# Patient Record
Sex: MALE | Race: WHITE | NOT HISPANIC OR LATINO | Employment: OTHER | URBAN - METROPOLITAN AREA
[De-identification: names, ages, dates, MRNs, and addresses within clinical notes are randomized per-mention and may not be internally consistent; named-entity substitution may affect disease eponyms.]

---

## 2017-02-20 DIAGNOSIS — I49.3 PVC (PREMATURE VENTRICULAR CONTRACTION): Primary | ICD-10-CM

## 2017-02-20 RX ORDER — VERAPAMIL HYDROCHLORIDE 120 MG/1
120 TABLET, FILM COATED ORAL EVERY MORNING
Qty: 90 TABLET | Refills: 3 | Status: SHIPPED | OUTPATIENT
Start: 2017-02-20 | End: 2017-08-11

## 2017-03-10 DIAGNOSIS — I49.3 PVC (PREMATURE VENTRICULAR CONTRACTION): Primary | ICD-10-CM

## 2017-03-10 RX ORDER — LISINOPRIL 10 MG/1
10 TABLET ORAL DAILY
Qty: 90 TABLET | Refills: 3 | Status: SHIPPED | OUTPATIENT
Start: 2017-03-10 | End: 2017-08-17

## 2017-07-13 ENCOUNTER — TELEPHONE (OUTPATIENT)
Dept: PHARMACY | Facility: CLINIC | Age: 67
End: 2017-07-13

## 2017-07-13 ENCOUNTER — INITIAL CONSULT (OUTPATIENT)
Dept: HEMATOLOGY/ONCOLOGY | Facility: CLINIC | Age: 67
End: 2017-07-13
Payer: MEDICARE

## 2017-07-13 VITALS
BODY MASS INDEX: 26.36 KG/M2 | WEIGHT: 189 LBS | DIASTOLIC BLOOD PRESSURE: 86 MMHG | HEART RATE: 101 BPM | TEMPERATURE: 98 F | SYSTOLIC BLOOD PRESSURE: 152 MMHG | OXYGEN SATURATION: 99 % | RESPIRATION RATE: 18 BRPM

## 2017-07-13 DIAGNOSIS — C64.2 RENAL CELL CARCINOMA OF LEFT KIDNEY: ICD-10-CM

## 2017-07-13 DIAGNOSIS — C64.9 RENAL CELL CARCINOMA, UNSPECIFIED LATERALITY: ICD-10-CM

## 2017-07-13 DIAGNOSIS — C78.7 LIVER METASTASES: ICD-10-CM

## 2017-07-13 DIAGNOSIS — C64.2 RENAL CELL CARCINOMA OF LEFT KIDNEY: Primary | ICD-10-CM

## 2017-07-13 DIAGNOSIS — C64.9 RENAL CELL CARCINOMA OF KIDNEY EXCLUDING RENAL PELVIS: ICD-10-CM

## 2017-07-13 PROCEDURE — 99999 PR PBB SHADOW E&M-EST. PATIENT-LVL III: CPT | Mod: PBBFAC,,, | Performed by: INTERNAL MEDICINE

## 2017-07-13 PROCEDURE — 99205 OFFICE O/P NEW HI 60 MIN: CPT | Mod: S$PBB,,, | Performed by: INTERNAL MEDICINE

## 2017-07-13 PROCEDURE — 1159F MED LIST DOCD IN RCRD: CPT | Mod: S$GLB,,, | Performed by: INTERNAL MEDICINE

## 2017-07-13 RX ORDER — SUNITINIB MALATE 37.5 MG/1
CAPSULE ORAL
Qty: 14 CAPSULE | Refills: 6 | Status: SHIPPED | OUTPATIENT
Start: 2017-07-13 | End: 2017-07-13 | Stop reason: SDUPTHER

## 2017-07-13 RX ORDER — SUNITINIB MALATE 37.5 MG/1
CAPSULE ORAL
Qty: 14 CAPSULE | Refills: 6 | Status: SHIPPED | OUTPATIENT
Start: 2017-07-13 | End: 2017-07-17 | Stop reason: SDUPTHER

## 2017-07-13 RX ORDER — SODIUM CHLORIDE 0.9 % (FLUSH) 0.9 %
10 SYRINGE (ML) INJECTION
Status: CANCELLED | OUTPATIENT
Start: 2017-07-20

## 2017-07-13 RX ORDER — HEPARIN 100 UNIT/ML
500 SYRINGE INTRAVENOUS
Status: CANCELLED | OUTPATIENT
Start: 2017-07-20

## 2017-07-13 NOTE — PROGRESS NOTES
Subjective:       Patient ID: Bud Russo III is a 66 y.o. male.    Chief Complaint: No chief complaint on file.    HPI   REFERRING PHYSICIAN:  Dr. Lux, [facility] in [place].    REASON FOR REFERRAL:  Renal cell carcinoma, assumption of care.    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  His diagnosis was   established on April 30th when he presented to an Emergency Room in [place], [place], where he lived with left flank pain.  Radiologic studies showed a mass   in his left kidney.  Subsequently, he went to [place], [place], where he   was staged and was found to have a mass in his liver.  On 05/23/2017, he   underwent an embolization of his tumor in preparation for a possible liver   resection; and on 06/06/2017, he underwent a left nephrectomy.  He remained in   the [place] area in preparation of a liver resection; however, an MRI of his   abdomen and pelvis that was done last week showed that there was significant   progression of the metastatic liver disease with interval increase in size of a   dominant right hepatic lobe mass from 6.1 cm previously to 11.2 cm with evidence   of internal necrosis and peripheral enhancement.  In addition, multiple new   lesions were identified in both hepatic lobes, measuring up to 1.9 cm in the   left lateral lobe and 1.2 cm in the right hepatic lobe.  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  The patient met with an oncologist at [facility], and he was   offered chemotherapy.  He had decided to return to [place] and receive   chemotherapy at our facility.  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    PAST MEDICAL HISTORY:  As above.  He has  a history of hypothyroidism for which   he is on Synthroid and mild hypertension for which he was on lisinopril that was   subsequently switched to verapamil.  He is also on testosterone injections and   has been on them for more than 10 years.    SOCIAL HISTORY:  He is a Radiology physician.  He does not smoke and does not   drink alcohol.  He lives in [place] with his wife.    FAMILY HISTORY:  His father and his paternal grandmother had been diagnosed with   colon cancer.    Review of Systems      Overall he feels OK. He states that he has lost about 20 lbs since his surgery but his appetite is good.  He mentions some slight discomfort in his right shoulder which he attributes possibly to referred pain from his lover.  He denies any anxiety, depression, easy bruising, fevers, chills, night  sweats, weight loss, nausea, vomiting, diarrhea, constipation, diplopia, blurred vision, headache, chest pain, palpitations, shortness of breath, breast pain, abdominal pain, extremity pain, or difficulty ambulating.  The remainder of the ten-point ROS, including general, skin, lymph, H/N, cardiorespiratory, GI, , Neuro, Endocrine, and psychiatric is negative.     Objective:      Physical Exam    He is alert, oriented to time, place, person, pleasant, well      nourished, in no acute physical distress.  He is here with his wife.                                  VITAL SIGNS:  Reviewed                                      HEENT:  Normal.  There are no nasal, oral, lip, gingival, auricular, lid,    or conjunctival lesions.  Mucosae are moist and pink, and there is no        thrush.  Pupils are equal, reactive to light and accommodation.              Extraocular muscle movements are intact.  Fundi are sharp, and he has no papilledema.   Dentition is good.  There is no frontal or maxillary tenderness.                                   NECK:  Supple without JVD, adenopathy, or thyromegaly.                       LUNGS:  Clear to  auscultation without wheezing, rales, or rhonchi.           CARDIOVASCULAR:  Reveals an S1, S2, no murmurs, no rubs, no gallops.         ABDOMEN:  Soft, nontender, without organomegaly.  Bowel sounds are  present.  A scar from his left nephrectomy is identified.     Scars from bilateral varicocele surgeries are identified.                                                                 EXTREMITIES:  No cyanosis, clubbing, or edema.                                                               LYMPHATIC:  There is no cervical, axillary, or supraclavicular adenopathy.   SKIN:  Warm and moist, without petechiae, rashes, induration, or ecchymoses.           NEUROLOGIC:  DTRs are 0-1+ bilaterally, symmetrical, motor function is 5/5,  and cranial nerves are  within normal limits.  Both fundi are sharp.    Assessment:       1. Renal cell carcinoma, unspecified laterality    2. Liver metastases        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  Duration of visit was 70 minutes.  He understands that he has an incurable illness and he is aware of his prognosis.  We discussed the recommendations he received from [place]; he was given a prescription for sunitinib 37.5 mg tablets, and we have already initiated the authorization process for gemcitabine.  He will have baseline labs, and see me again in 5 days to initiate chemotherapy, as per the Cancer [id number] paper.  We will also obtain a non contrast CT of his chest and a bone scan.  In the absence of any neurological symptoms or papilledema, I do not think that he has to have a repeat brain imaging study at this point, however, I explained to him that should he develop any neurologic signs, a brain MRI will be ordered.  He voiced understanding.    I will see him with baseline labs the day of his chemotherapy.  His labs can be obtained early next week at the [facility].    Dr. Russo would like to continue to consult with his  [place] Physician, Dr. Lux.  I told him that we will be happy to follow his recommendations.  He will bring me the recent staging scans from [place] to be read within our system, and he will be restaged after three cycles.    His multiple questions were answered to his satisfaction.

## 2017-07-13 NOTE — Clinical Note
Needs bone scan and non contrast Ct of the chest at the [facility].  He also needs baseline labs, and to see me on Tuesday for chemotherapy

## 2017-07-14 ENCOUNTER — TELEPHONE (OUTPATIENT)
Dept: HEMATOLOGY/ONCOLOGY | Facility: CLINIC | Age: 67
End: 2017-07-14

## 2017-07-14 DIAGNOSIS — C79.9 METASTATIC CANCER: Primary | ICD-10-CM

## 2017-07-14 DIAGNOSIS — C78.7 LIVER METASTASES: Primary | ICD-10-CM

## 2017-07-14 NOTE — TELEPHONE ENCOUNTER
Dr. Stock,    A prescription was sent for sunitinib 37.5 mg daily x 2 weeks every 3 weeks.  Package insert indicates a dosing regimen of sunitinib 50 mg once daily for 4 weeks of a 6-week treatment cycle (4 weeks on, 2 weeks off) for RCC.  Can you please clarify dose and regimen?    Thanks,   Garcia Jensen, PharmD, BCPS [facility]  [phone]

## 2017-07-17 ENCOUNTER — TELEPHONE (OUTPATIENT)
Dept: PHARMACY | Facility: CLINIC | Age: 67
End: 2017-07-17

## 2017-07-17 DIAGNOSIS — C64.2 RENAL CELL ADENOCARCINOMA, LEFT: Primary | ICD-10-CM

## 2017-07-17 DIAGNOSIS — C64.2 RENAL CELL ADENOCARCINOMA, LEFT: ICD-10-CM

## 2017-07-17 RX ORDER — SUNITINIB MALATE 37.5 MG/1
37.5 CAPSULE ORAL DAILY
Qty: 28 CAPSULE | Refills: 3 | Status: SHIPPED | OUTPATIENT
Start: 2017-07-17 | End: 2017-07-17 | Stop reason: SDUPTHER

## 2017-07-17 RX ORDER — SUNITINIB MALATE 37.5 MG/1
37.5 CAPSULE ORAL DAILY
Qty: 28 CAPSULE | Refills: 3 | Status: SHIPPED | OUTPATIENT
Start: 2017-07-17 | End: 2018-01-16 | Stop reason: ALTCHOICE

## 2017-07-17 NOTE — TELEPHONE ENCOUNTER
Documentation Only    Sutent 37.5mg is approved 7-17-17-through 7-16-20        [employer] Pay Assistance  ID [id number]  [facility] [id number]  PCN [id number]  BIN [id number]  Effective 7-17-17 through 7-31-17  Processing Code 08  [phone]

## 2017-07-18 ENCOUNTER — TELEPHONE (OUTPATIENT)
Dept: SURGERY | Facility: CLINIC | Age: 67
End: 2017-07-18

## 2017-07-19 ENCOUNTER — HOSPITAL ENCOUNTER (OUTPATIENT)
Dept: RADIOLOGY | Facility: HOSPITAL | Age: 67
Discharge: HOME | End: 2017-07-19
Attending: INTERNAL MEDICINE
Payer: MEDICARE

## 2017-07-19 ENCOUNTER — TELEPHONE (OUTPATIENT)
Dept: HEMATOLOGY/ONCOLOGY | Facility: CLINIC | Age: 67
End: 2017-07-19

## 2017-07-19 DIAGNOSIS — C78.7 LIVER METASTASES: ICD-10-CM

## 2017-07-19 DIAGNOSIS — C64.9 RENAL CELL CARCINOMA, UNSPECIFIED LATERALITY: ICD-10-CM

## 2017-07-19 DIAGNOSIS — C79.9 METASTATIC CANCER: ICD-10-CM

## 2017-07-19 PROCEDURE — 71250 CT THORAX DX C-: CPT | Mod: 26,,, | Performed by: RADIOLOGY

## 2017-07-19 PROCEDURE — 70551 MRI BRAIN STEM W/O DYE: CPT | Mod: 26,,, | Performed by: RADIOLOGY

## 2017-07-19 PROCEDURE — 78306 BONE IMAGING WHOLE BODY: CPT | Mod: 26,,, | Performed by: RADIOLOGY

## 2017-07-19 PROCEDURE — A9503 TC99M MEDRONATE: HCPCS | Mod: PO

## 2017-07-20 ENCOUNTER — OFFICE VISIT (OUTPATIENT)
Dept: HEMATOLOGY/ONCOLOGY | Facility: CLINIC | Age: 67
End: 2017-07-20
Payer: MEDICARE

## 2017-07-20 ENCOUNTER — INFUSION (OUTPATIENT)
Dept: INFUSION THERAPY | Facility: HOSPITAL | Age: 67
End: 2017-07-20
Attending: INTERNAL MEDICINE
Payer: MEDICARE

## 2017-07-20 VITALS
WEIGHT: 189.63 LBS | DIASTOLIC BLOOD PRESSURE: 69 MMHG | OXYGEN SATURATION: 97 % | BODY MASS INDEX: 30.47 KG/M2 | RESPIRATION RATE: 18 BRPM | SYSTOLIC BLOOD PRESSURE: 141 MMHG | HEIGHT: 66 IN | HEART RATE: 95 BPM

## 2017-07-20 VITALS
RESPIRATION RATE: 18 BRPM | SYSTOLIC BLOOD PRESSURE: 124 MMHG | HEART RATE: 69 BPM | DIASTOLIC BLOOD PRESSURE: 68 MMHG | TEMPERATURE: 99 F

## 2017-07-20 DIAGNOSIS — C64.9 RENAL CELL CARCINOMA OF KIDNEY EXCLUDING RENAL PELVIS: Primary | ICD-10-CM

## 2017-07-20 DIAGNOSIS — Z51.11 ENCOUNTER FOR ANTINEOPLASTIC CHEMOTHERAPY: ICD-10-CM

## 2017-07-20 DIAGNOSIS — C64.2 RENAL CELL CARCINOMA, LEFT: Primary | ICD-10-CM

## 2017-07-20 DIAGNOSIS — C64.2 RENAL CELL CARCINOMA, LEFT: ICD-10-CM

## 2017-07-20 PROCEDURE — 1159F MED LIST DOCD IN RCRD: CPT | Mod: ,,, | Performed by: INTERNAL MEDICINE

## 2017-07-20 PROCEDURE — 25000003 PHARM REV CODE 250: Performed by: INTERNAL MEDICINE

## 2017-07-20 PROCEDURE — 96413 CHEMO IV INFUSION 1 HR: CPT

## 2017-07-20 PROCEDURE — 63600175 PHARM REV CODE 636 W HCPCS: Performed by: INTERNAL MEDICINE

## 2017-07-20 PROCEDURE — 99999 PR PBB SHADOW E&M-EST. PATIENT-LVL III: CPT | Mod: PBBFAC,,, | Performed by: INTERNAL MEDICINE

## 2017-07-20 PROCEDURE — 1126F AMNT PAIN NOTED NONE PRSNT: CPT | Mod: ,,, | Performed by: INTERNAL MEDICINE

## 2017-07-20 PROCEDURE — 96367 TX/PROPH/DG ADDL SEQ IV INF: CPT

## 2017-07-20 PROCEDURE — 99215 OFFICE O/P EST HI 40 MIN: CPT | Mod: S$PBB,,, | Performed by: INTERNAL MEDICINE

## 2017-07-20 RX ORDER — SODIUM CHLORIDE 0.9 % (FLUSH) 0.9 %
10 SYRINGE (ML) INJECTION
Status: DISCONTINUED | OUTPATIENT
Start: 2017-07-20 | End: 2017-07-20 | Stop reason: HOSPADM

## 2017-07-20 RX ORDER — ONDANSETRON 4 MG/1
4 TABLET, FILM COATED ORAL EVERY 6 HOURS PRN
Qty: 30 TABLET | Refills: 3 | Status: ON HOLD | OUTPATIENT
Start: 2017-07-20 | End: 2018-03-07

## 2017-07-20 RX ORDER — ONDANSETRON 4 MG/1
4 TABLET, FILM COATED ORAL EVERY 6 HOURS PRN
Qty: 30 TABLET | Refills: 3 | Status: SHIPPED | OUTPATIENT
Start: 2017-07-20 | End: 2017-07-20 | Stop reason: SDUPTHER

## 2017-07-20 RX ORDER — HEPARIN 100 UNIT/ML
500 SYRINGE INTRAVENOUS
Status: DISCONTINUED | OUTPATIENT
Start: 2017-07-20 | End: 2017-07-20 | Stop reason: HOSPADM

## 2017-07-20 RX ADMIN — DEXAMETHASONE SODIUM PHOSPHATE 10 MG: 4 INJECTION, SOLUTION INTRAMUSCULAR; INTRAVENOUS at 10:07

## 2017-07-20 RX ADMIN — GEMCITABINE 2000 MG: 1 INJECTION, POWDER, LYOPHILIZED, FOR SOLUTION INTRAVENOUS at 11:07

## 2017-07-20 RX ADMIN — SODIUM CHLORIDE: 0.9 INJECTION, SOLUTION INTRAVENOUS at 10:07

## 2017-07-20 NOTE — PLAN OF CARE
Problem: Chemotherapy Effects (Adult)  Goal: Signs and Symptoms of Listed Potential Problems Will be Absent, Minimized or Managed (Chemotherapy Effects)  Signs and symptoms of listed potential problems will be absent, minimized or managed by discharge/transition of care (reference Chemotherapy Effects (Adult) CPG).  Pt here for first cycle Gemzar. Presents with no symptoms. Will review Gemzar s/s and chemo education. Chemo care handout and binder given.

## 2017-07-20 NOTE — PROGRESS NOTES
Subjective:       Patient ID: Bud Russo III is a 66 y.o. male.    Chief Complaint: No chief complaint on file.    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  His brain MRI was unremarkable, and so was his bone scan except for two minor rib lesions that were felt not to be secondary to metastases.  His CBC shows a WBC count of 10,600 /mm3, Hg 13.8 gr/dl, Ht 40.6 % and platelets 229,000 /mm3.  His creatinine is 2.2 mg/dl, BUn is 26, and albumin is 3.2 gr/dl.    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  His diagnosis was established on April 30th when he presented to an Emergency Room in [place], [place], where he lived with left flank pain.  Radiologic studies showed a mass in his left kidney.  Subsequently, he went to [place], [place], where he was staged and was found to have a mass in his liver.  On 05/23/2017, he   underwent an embolization of his tumor in preparation for a possible liver resection; and on 06/06/2017, he underwent a left nephrectomy.  He remained in the [place] area in preparation of a liver resection; however, an MRI of his abdomen and pelvis that was done last week showed that there was significant progression of the metastatic liver disease with interval increase in size of a dominant right hepatic lobe mass from 6.1 cm previously to 11.2 cm with evidence of internal necrosis and peripheral enhancement.  In addition, multiple new lesions were identified in both hepatic lobes, measuring up to 1.9 cm in the left lateral lobe and 1.2 cm in the right hepatic lobe.  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  The patient met with an oncologist at [facility], and he was offered chemotherapy.  He had decided to return to [place] and receive chemotherapy at our facility.  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    Review of Systems      Overall he feels OK. He states that he has lost about 20 lbs since his surgery but his appetite is good.  He mentions some slight discomfort in his right shoulder which he attributes possibly to referred pain from his lover.  He denies any anxiety, depression, easy bruising, fevers, chills, night  sweats, weight loss, nausea, vomiting, diarrhea, constipation, diplopia, blurred vision, headache, chest pain, palpitations, shortness of breath, breast pain, abdominal pain, extremity pain, or difficulty ambulating.  The remainder of the ten-point ROS, including general, skin, lymph, H/N, cardiorespiratory, GI, , Neuro, Endocrine, and psychiatric is negative.     Objective:      Physical Exam    He is alert, oriented to time, place, person, pleasant, well      nourished, in no acute physical distress.  He is here with his wife.                                  VITAL SIGNS:  Reviewed                                      HEENT:  Normal.  There are no nasal, oral, lip, gingival, auricular, lid,    or conjunctival lesions.  Mucosae are moist and pink, and there is no        thrush.  Pupils are equal, reactive to light and accommodation.              Extraocular muscle movements are intact.  Fundi are sharp, and he has no papilledema.   Dentition is good.  There is no frontal or maxillary tenderness.                                   NECK:  Supple without JVD, adenopathy, or thyromegaly.                       LUNGS:  Clear to auscultation without wheezing, rales, or rhonchi.           CARDIOVASCULAR:  Reveals an S1, S2, no murmurs, no rubs, no gallops.         ABDOMEN:  Soft, nontender, without organomegaly.  Bowel sounds are  present.  A scar from his left nephrectomy is identified.     Scars from bilateral varicocele surgeries are identified.                                                                  EXTREMITIES:  No cyanosis, clubbing, or edema.                                                               LYMPHATIC:  There is no cervical, axillary, or supraclavicular adenopathy.   SKIN:  Warm and moist, without petechiae, rashes, induration, or ecchymoses.           NEUROLOGIC:  DTRs are 0-1+ bilaterally, symmetrical, motor function is 5/5,  and cranial nerves are  within normal limits.  Both fundi are sharp.    Assessment:       1. Renal cell carcinoma, left    2. 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  He will start sutent at 37.5 mg/ daily x 2 weeks, and receive his day 1 infusion of gemcitabine.  He may take zofran prn for nausea, and will see me in one week with a repeat CBc for his D8 chemotherapy. Consent form signed.  Neutropenic precautions explained.  His multiple questions were answered to his satisfaction.

## 2017-07-21 ENCOUNTER — TELEPHONE (OUTPATIENT)
Dept: HEMATOLOGY/ONCOLOGY | Facility: CLINIC | Age: 67
End: 2017-07-21

## 2017-07-25 ENCOUNTER — TELEPHONE (OUTPATIENT)
Dept: HEMATOLOGY/ONCOLOGY | Facility: CLINIC | Age: 67
End: 2017-07-25

## 2017-07-25 NOTE — TELEPHONE ENCOUNTER
Returned call to pt's wife, no answer, left message asking her to call back in regards to her 's apts.

## 2017-07-25 NOTE — TELEPHONE ENCOUNTER
----- Message from Jackeline Prakash RN sent at 7/25/2017  4:09 PM CDT -----  Contact: Pt wife      ----- Message -----  From: Elsa Mcgovern  Sent: 7/25/2017  10:50 AM  To: Day[facility] De [facility]a Staff    Pt wife is requesting a call back in regards to rescheduling pt upcoming appt.       Pt can be contacted at  [phone]

## 2017-07-26 NOTE — PROGRESS NOTES
Subjective:       Patient ID: Bud Russo III is a 66 y.o. male.    Chief Complaint: No chief complaint on file.    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  He is also on tarceva 37.5 mg/daily which he will take for another week.  His CBC shows a WBC count of 5,300 /mm3, Hg 13.9 gr/dl, Ht 40.8 % and platelets 180,000 /mm3.      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  His diagnosis was established on April 30th when he presented to an Emergency Room in [place], [place], where he lived with left flank pain.  Radiologic studies showed a mass in his left kidney.  Subsequently, he went to [place], [place], where he was staged and was found to have a mass in his liver.  On 05/23/2017, he   underwent an embolization of his tumor in preparation for a possible liver resection; and on 06/06/2017, he underwent a left nephrectomy.  He remained in the [place] area in preparation of a liver resection; however, an MRI of his abdomen and pelvis that was done last week showed that there was significant progression of the metastatic liver disease with interval increase in size of a dominant right hepatic lobe mass from 6.1 cm previously to 11.2 cm with evidence of internal necrosis and peripheral enhancement.  In addition, multiple new lesions were identified in both hepatic lobes, measuring up to 1.9 cm in the left lateral lobe and 1.2 cm in the right hepatic lobe.  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  The patient met with an oncologist at [facility], and he was offered chemotherapy.  He had decided to return to [place] and receive chemotherapy at our facility.  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  He  started chemotherapy a week ago, and he is due for his C1 D8 gemcitabine today.    Review of Systems      Overall he feels OK. He tolerated the first week of chemotehrapy quite well and did not experience any significant side effects other than occasional low grade fevers which were relieved with tylenol.  He denies any anxiety, depression, easy bruising, fevers, chills, night  sweats, weight loss, nausea, vomiting, diarrhea, constipation, diplopia, blurred vision, headache, chest pain, palpitations, shortness of breath, breast pain, abdominal pain, extremity pain, or difficulty ambulating.  The remainder of the ten-point ROS, including general, skin, lymph, H/N, cardiorespiratory, GI, , Neuro, Endocrine, and psychiatric is negative.     Objective:      Physical Exam    He is alert, oriented to time, place, person, pleasant, well      nourished, in no acute physical distress.  He is here with his wife.                                  VITAL SIGNS:  Reviewed                                      HEENT:  Normal.  There are no nasal, oral, lip, gingival, auricular, lid,    or conjunctival lesions.  Mucosae are moist and pink, and there is no        thrush.  Pupils are equal, reactive to light and accommodation.              Extraocular muscle movements are intact.  Fundi are sharp, and he has no papilledema.   Dentition is good.  There is no frontal or maxillary tenderness.                                   NECK:  Supple without JVD, adenopathy, or thyromegaly.                       LUNGS:  Clear to auscultation without wheezing, rales, or rhonchi.           CARDIOVASCULAR:  Reveals an S1, S2, no murmurs, no rubs, no gallops.         ABDOMEN:  Soft, nontender, without organomegaly.  Bowel sounds are  present.  A scar from his left nephrectomy is identified.     Scars from bilateral varicocele surgeries are identified.                                                                 EXTREMITIES:  No cyanosis, clubbing,  or edema.                                                               LYMPHATIC:  There is no cervical, axillary, or supraclavicular adenopathy.   SKIN:  Warm and moist, without petechiae, rashes, induration, or ecchymoses.           NEUROLOGIC:  DTRs are 0-1+ bilaterally, symmetrical, motor function is 5/5,  and cranial nerves are  within normal limits.  Both fundi are sharp.    Assessment:       1. Renal cell carcinoma of kidney excluding renal pelvis    2. Liver metastases    3. 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  He will continue the sutent at 37.5 mg/ daily x 2 weeks, and receive his day 8 infusion of gemcitabine.  He may take zofran prn for nausea, and will see me in 2 weeks with a repeat CBC for his C2 D1  chemotherapy. Neutropenic precautions explained.  He will be restaged after three cycles.  His multiple questions were answered to his satisfaction.

## 2017-07-27 ENCOUNTER — OFFICE VISIT (OUTPATIENT)
Dept: HEMATOLOGY/ONCOLOGY | Facility: CLINIC | Age: 67
End: 2017-07-27
Payer: MEDICARE

## 2017-07-27 ENCOUNTER — INFUSION (OUTPATIENT)
Dept: INFUSION THERAPY | Facility: HOSPITAL | Age: 67
End: 2017-07-27
Attending: INTERNAL MEDICINE
Payer: MEDICARE

## 2017-07-27 VITALS
OXYGEN SATURATION: 99 % | RESPIRATION RATE: 18 BRPM | TEMPERATURE: 99 F | WEIGHT: 189 LBS | DIASTOLIC BLOOD PRESSURE: 73 MMHG | SYSTOLIC BLOOD PRESSURE: 158 MMHG | HEART RATE: 64 BPM | BODY MASS INDEX: 30.37 KG/M2 | HEIGHT: 66 IN

## 2017-07-27 VITALS
SYSTOLIC BLOOD PRESSURE: 126 MMHG | HEART RATE: 64 BPM | DIASTOLIC BLOOD PRESSURE: 71 MMHG | RESPIRATION RATE: 16 BRPM | TEMPERATURE: 98 F

## 2017-07-27 DIAGNOSIS — Z51.11 ENCOUNTER FOR ANTINEOPLASTIC CHEMOTHERAPY: ICD-10-CM

## 2017-07-27 DIAGNOSIS — C64.9 RENAL CELL CARCINOMA OF KIDNEY EXCLUDING RENAL PELVIS: Primary | ICD-10-CM

## 2017-07-27 DIAGNOSIS — C78.7 LIVER METASTASES: ICD-10-CM

## 2017-07-27 PROCEDURE — 25000003 PHARM REV CODE 250: Performed by: INTERNAL MEDICINE

## 2017-07-27 PROCEDURE — 99215 OFFICE O/P EST HI 40 MIN: CPT | Mod: S$PBB,,, | Performed by: INTERNAL MEDICINE

## 2017-07-27 PROCEDURE — 1159F MED LIST DOCD IN RCRD: CPT | Mod: ,,, | Performed by: INTERNAL MEDICINE

## 2017-07-27 PROCEDURE — 63600175 PHARM REV CODE 636 W HCPCS: Performed by: INTERNAL MEDICINE

## 2017-07-27 PROCEDURE — 99999 PR PBB SHADOW E&M-EST. PATIENT-LVL I: CPT | Mod: PBBFAC,,, | Performed by: INTERNAL MEDICINE

## 2017-07-27 PROCEDURE — 96413 CHEMO IV INFUSION 1 HR: CPT

## 2017-07-27 PROCEDURE — 96367 TX/PROPH/DG ADDL SEQ IV INF: CPT

## 2017-07-27 RX ORDER — SODIUM CHLORIDE 0.9 % (FLUSH) 0.9 %
10 SYRINGE (ML) INJECTION
Status: CANCELLED | OUTPATIENT
Start: 2017-08-03

## 2017-07-27 RX ORDER — HEPARIN 100 UNIT/ML
500 SYRINGE INTRAVENOUS
Status: CANCELLED | OUTPATIENT
Start: 2017-08-03

## 2017-07-27 RX ORDER — HEPARIN 100 UNIT/ML
500 SYRINGE INTRAVENOUS
Status: CANCELLED | OUTPATIENT
Start: 2017-07-27

## 2017-07-27 RX ORDER — HEPARIN 100 UNIT/ML
500 SYRINGE INTRAVENOUS
Status: DISCONTINUED | OUTPATIENT
Start: 2017-07-27 | End: 2017-07-27 | Stop reason: HOSPADM

## 2017-07-27 RX ORDER — SODIUM CHLORIDE 0.9 % (FLUSH) 0.9 %
10 SYRINGE (ML) INJECTION
Status: CANCELLED | OUTPATIENT
Start: 2017-07-27

## 2017-07-27 RX ORDER — SODIUM CHLORIDE 0.9 % (FLUSH) 0.9 %
10 SYRINGE (ML) INJECTION
Status: DISCONTINUED | OUTPATIENT
Start: 2017-07-27 | End: 2017-07-27 | Stop reason: HOSPADM

## 2017-07-27 RX ADMIN — SODIUM CHLORIDE: 9 INJECTION, SOLUTION INTRAVENOUS at 03:07

## 2017-07-27 RX ADMIN — DEXAMETHASONE SODIUM PHOSPHATE 10 MG: 4 INJECTION, SOLUTION INTRAMUSCULAR; INTRAVENOUS at 03:07

## 2017-07-27 RX ADMIN — GEMCITABINE HYDROCHLORIDE 2000 MG: 200 INJECTION, POWDER, LYOPHILIZED, FOR SOLUTION INTRAVENOUS at 03:07

## 2017-07-27 NOTE — PLAN OF CARE
Problem: Patient Care Overview (Adult)  Goal: Plan of Care Review  Outcome: Ongoing (interventions implemented as appropriate)  Pt tolerated cycle 1, day 8 Gemzar IV infusion well without side effect. PIV removed. AVS printed and given to pt. VSS; NAD. Discharging at this time.

## 2017-08-10 ENCOUNTER — OFFICE VISIT (OUTPATIENT)
Dept: HEMATOLOGY/ONCOLOGY | Facility: CLINIC | Age: 67
End: 2017-08-10
Payer: MEDICARE

## 2017-08-10 ENCOUNTER — INFUSION (OUTPATIENT)
Dept: INFUSION THERAPY | Facility: HOSPITAL | Age: 67
End: 2017-08-10
Attending: INTERNAL MEDICINE
Payer: MEDICARE

## 2017-08-10 ENCOUNTER — TELEPHONE (OUTPATIENT)
Dept: HEMATOLOGY/ONCOLOGY | Facility: CLINIC | Age: 67
End: 2017-08-10

## 2017-08-10 VITALS
SYSTOLIC BLOOD PRESSURE: 148 MMHG | RESPIRATION RATE: 16 BRPM | HEART RATE: 75 BPM | TEMPERATURE: 98 F | DIASTOLIC BLOOD PRESSURE: 75 MMHG

## 2017-08-10 VITALS
BODY MASS INDEX: 29.76 KG/M2 | TEMPERATURE: 99 F | OXYGEN SATURATION: 99 % | DIASTOLIC BLOOD PRESSURE: 76 MMHG | HEART RATE: 86 BPM | WEIGHT: 189.63 LBS | SYSTOLIC BLOOD PRESSURE: 132 MMHG | HEIGHT: 67 IN | RESPIRATION RATE: 18 BRPM

## 2017-08-10 DIAGNOSIS — C64.9 RENAL CELL CARCINOMA OF KIDNEY EXCLUDING RENAL PELVIS: Primary | ICD-10-CM

## 2017-08-10 DIAGNOSIS — C78.7 LIVER METASTASES: ICD-10-CM

## 2017-08-10 DIAGNOSIS — I49.3 PVC (PREMATURE VENTRICULAR CONTRACTION): ICD-10-CM

## 2017-08-10 DIAGNOSIS — Z51.11 ENCOUNTER FOR ANTINEOPLASTIC CHEMOTHERAPY: ICD-10-CM

## 2017-08-10 PROCEDURE — 96367 TX/PROPH/DG ADDL SEQ IV INF: CPT

## 2017-08-10 PROCEDURE — 1159F MED LIST DOCD IN RCRD: CPT | Mod: ,,, | Performed by: INTERNAL MEDICINE

## 2017-08-10 PROCEDURE — 99999 PR PBB SHADOW E&M-EST. PATIENT-LVL III: CPT | Mod: PBBFAC,,, | Performed by: INTERNAL MEDICINE

## 2017-08-10 PROCEDURE — 99215 OFFICE O/P EST HI 40 MIN: CPT | Mod: S$PBB,,, | Performed by: INTERNAL MEDICINE

## 2017-08-10 PROCEDURE — 25000003 PHARM REV CODE 250: Performed by: INTERNAL MEDICINE

## 2017-08-10 PROCEDURE — 3075F SYST BP GE 130 - 139MM HG: CPT | Mod: ,,, | Performed by: INTERNAL MEDICINE

## 2017-08-10 PROCEDURE — 63600175 PHARM REV CODE 636 W HCPCS: Performed by: INTERNAL MEDICINE

## 2017-08-10 PROCEDURE — 1126F AMNT PAIN NOTED NONE PRSNT: CPT | Mod: ,,, | Performed by: INTERNAL MEDICINE

## 2017-08-10 PROCEDURE — 96413 CHEMO IV INFUSION 1 HR: CPT

## 2017-08-10 PROCEDURE — 3078F DIAST BP <80 MM HG: CPT | Mod: ,,, | Performed by: INTERNAL MEDICINE

## 2017-08-10 RX ORDER — HEPARIN 100 UNIT/ML
500 SYRINGE INTRAVENOUS
Status: DISCONTINUED | OUTPATIENT
Start: 2017-08-10 | End: 2017-08-10 | Stop reason: HOSPADM

## 2017-08-10 RX ORDER — SODIUM CHLORIDE 0.9 % (FLUSH) 0.9 %
10 SYRINGE (ML) INJECTION
Status: CANCELLED | OUTPATIENT
Start: 2017-08-10

## 2017-08-10 RX ORDER — FAMOTIDINE 40 MG/1
40 TABLET, FILM COATED ORAL 2 TIMES DAILY
Qty: 60 TABLET | Refills: 1 | Status: SHIPPED | OUTPATIENT
Start: 2017-08-10 | End: 2017-12-04 | Stop reason: SDUPTHER

## 2017-08-10 RX ORDER — HEPARIN 100 UNIT/ML
500 SYRINGE INTRAVENOUS
Status: CANCELLED | OUTPATIENT
Start: 2017-08-10

## 2017-08-10 RX ORDER — DOCUSATE SODIUM 100 MG/1
100 CAPSULE, LIQUID FILLED ORAL 3 TIMES DAILY PRN
Qty: 90 CAPSULE | Refills: 1 | Status: SHIPPED | OUTPATIENT
Start: 2017-08-10 | End: 2017-11-09 | Stop reason: SDUPTHER

## 2017-08-10 RX ORDER — VERAPAMIL HYDROCHLORIDE 120 MG/1
TABLET, FILM COATED ORAL
Qty: 90 TABLET | Refills: 3 | Status: CANCELLED | OUTPATIENT
Start: 2017-08-10

## 2017-08-10 RX ORDER — SODIUM CHLORIDE 0.9 % (FLUSH) 0.9 %
10 SYRINGE (ML) INJECTION
Status: DISCONTINUED | OUTPATIENT
Start: 2017-08-10 | End: 2017-08-10 | Stop reason: HOSPADM

## 2017-08-10 RX ADMIN — SODIUM CHLORIDE: 0.9 INJECTION, SOLUTION INTRAVENOUS at 01:08

## 2017-08-10 RX ADMIN — GEMCITABINE HYDROCHLORIDE 2020 MG: 200 INJECTION, POWDER, LYOPHILIZED, FOR SOLUTION INTRAVENOUS at 02:08

## 2017-08-10 RX ADMIN — DEXAMETHASONE SODIUM PHOSPHATE 10 MG: 4 INJECTION, SOLUTION INTRAMUSCULAR; INTRAVENOUS at 01:08

## 2017-08-10 NOTE — PROGRESS NOTES
Subjective:       Patient ID: Bud Russo III is a 66 y.o. male.    Chief Complaint: No chief complaint on file.    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  He is also on tarceva 37.5 mg/daily which he takes for two weeks every 3 weeks.  He started it again this am.  His CBC shows a WBC count of 3,800 /mm3, Hg 12.8 gr/dl, Ht 38.7 % and platelets 163,000 /mm3.  His creatinine is 2.5 mg/dl, and his K is 5.3 mEq/L.    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  His diagnosis was established on April 30th when he presented to an Emergency Room in [place], [place], where he lived with left flank pain.  Radiologic studies showed a mass in his left kidney.  Subsequently, he went to [place], [place], where he was staged and was found to have a mass in his liver.  On 05/23/2017, he   underwent an embolization of his tumor in preparation for a possible liver resection; and on 06/06/2017, he underwent a left nephrectomy.  He remained in the [place] area in preparation of a liver resection; however, an MRI of his abdomen and pelvis that was done last week showed that there was significant progression of the metastatic liver disease with interval increase in size of a dominant right hepatic lobe mass from 6.1 cm previously to 11.2 cm with evidence of internal necrosis and peripheral enhancement.  In addition, multiple new lesions were identified in both hepatic lobes, measuring up to 1.9 cm in the left lateral lobe and 1.2 cm in the right hepatic lobe.  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  The patient met with an oncologist at [place], and he was offered chemotherapy.  He had decided to return to [place] and receive chemotherapy at our facility.  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  He started chemotherapy 3 weeks ago, and he is due for his C2 D1 gemcitabine today.    Review of Systems      Overall he feels OK. He tolerated the first round of chemotehrapy quite well and did not experience any significant side effects.  He states that for the last 8 days he has not had any low grade fevers,   His energy level is good, and was able to move his household from [place] to [place].  He denies any anxiety, depression, easy bruising, fevers, chills, night  sweats, weight loss, nausea, vomiting, diarrhea, constipation, diplopia, blurred vision, headache, chest pain, palpitations, shortness of breath, breast pain, abdominal pain, extremity pain, or difficulty ambulating.  The remainder of the ten-point ROS, including general, skin, lymph, H/N, cardiorespiratory, GI, , Neuro, Endocrine, and psychiatric is negative.     Objective:      Physical Exam    He is alert, oriented to time, place, person, pleasant, well      nourished, in no acute physical distress.  He is here with his wife.                                  VITAL SIGNS:  Reviewed                                      HEENT:  Normal.  There are no nasal, oral, lip, gingival, auricular, lid,    or conjunctival lesions.  Mucosae are moist and pink, and there is no        thrush.  Pupils are equal, reactive to light and accommodation.              Extraocular muscle movements are intact.  Fundi are sharp, and he has no papilledema.   Dentition is good.  There is no frontal or maxillary tenderness.                                   NECK:  Supple without JVD, adenopathy, or thyromegaly.                       LUNGS:  Clear to auscultation without wheezing, rales, or rhonchi.           CARDIOVASCULAR:  Reveals an S1, S2, no murmurs, no rubs, no gallops.         ABDOMEN:  Soft, nontender, without organomegaly.  Bowel sounds are  present.  A scar from his left nephrectomy  is identified.     Scars from bilateral varicocele surgeries are identified.                                                                 EXTREMITIES:  No cyanosis, clubbing, or edema.                                                               LYMPHATIC:  There is no cervical, axillary, or supraclavicular adenopathy.   SKIN:  Warm and moist, without petechiae, rashes, induration, or ecchymoses.           NEUROLOGIC:  DTRs are 0-1+ bilaterally, symmetrical, motor function is 5/5,  and cranial nerves are  within normal limits.  Both fundi are sharp.    Assessment:       1. Renal cell carcinoma of kidney excluding renal pelvis    2. Liver metastases    3. Encounter for antineoplastic chemotherapy      4.    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  He will continue the sutent at 37.5 mg/ daily x 2 weeks, and receive his Cycle 2 day 1 infusion of gemcitabine.  He may take zofran prn for nausea, and will see me in 1 weeks with a repeat CBC for his C2 D8  chemotherapy. Neutropenic precautions explained.  He will be restaged after three cycles.  In regards to his elevated creatinine and K, I suggested he increase his hydration and we repeat his labs in 4 days; he opted to wait until he sees me again in a week.  His multiple questions were answered to his satisfaction.

## 2017-08-10 NOTE — PLAN OF CARE
Problem: Chemotherapy Effects (Adult)  Goal: Signs and Symptoms of Listed Potential Problems Will be Absent, Minimized or Managed (Chemotherapy Effects)  Signs and symptoms of listed potential problems will be absent, minimized or managed by discharge/transition of care (reference Chemotherapy Effects (Adult) CPG).   Outcome: Ongoing (interventions implemented as appropriate)  Pt arrived for Gemzar. VSS. PIV accessed with blood return. Will continue to monitor.

## 2017-08-10 NOTE — PLAN OF CARE
Problem: Patient Care Overview (Adult)  Goal: Plan of Care Review  Outcome: Ongoing (interventions implemented as appropriate)  Pt received Gemzar without complications. VSS. No complaints at this time. PIV removed and AVS given to patient

## 2017-08-11 RX ORDER — VERAPAMIL HYDROCHLORIDE 180 MG/1
180 TABLET, FILM COATED, EXTENDED RELEASE ORAL DAILY
Qty: 90 TABLET | Refills: 3 | Status: ON HOLD | OUTPATIENT
Start: 2017-08-11 | End: 2018-03-09 | Stop reason: HOSPADM

## 2017-08-16 NOTE — PROGRESS NOTES
Subjective:       Patient ID: Bud Russo III is a 66 y.o. male.    Chief Complaint: No chief complaint on file.    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  He is also on tarceva 37.5 mg/daily which he takes for two weeks every 3 weeks.  He started it again this am.  His CBC shows a WBC count of 2,800 /mm3, Hg 11.7 gr/dl, Ht 35.3 % and platelets 138,000 /mm3.  His creatinine is 2.3 mg/dl, and his K is 4.7 mEq/L.    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  His diagnosis was established on April 30th when he presented to an Emergency Room in [place], [place], where he lived with left flank pain.  Radiologic studies showed a mass in his left kidney.  Subsequently, he went to [place], [place], where he was staged and was found to have a mass in his liver.  On 05/23/2017, he   underwent an embolization of his tumor in preparation for a possible liver resection; and on 06/06/2017, he underwent a left nephrectomy.  He remained in the [place] area in preparation of a liver resection; however, an MRI of his abdomen and pelvis that was done last week showed that there was significant progression of the metastatic liver disease with interval increase in size of a dominant right hepatic lobe mass from 6.1 cm previously to 11.2 cm with evidence of internal necrosis and peripheral enhancement.  In addition, multiple new lesions were identified in both hepatic lobes, measuring up to 1.9 cm in the left lateral lobe and 1.2 cm in the right hepatic lobe.  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  The patient met with an oncologist at [facility], and he was offered chemotherapy.  He had decided to return to [place] and receive chemotherapy at our facility.  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  He started chemotherapy 5 weeks ago, and he is due for his C2 D8 gemcitabine today.    Review of Systems      Overall he feels OK. He tolerated the last round of chemotehrapy quite well and did not experience any significant side effects.  He states that for the last 8 days he has not had any low grade fevers,  exc ept for last night when his temperature gabby to 100.1 degrees.  No chills.   His energy level is good.  He denies any anxiety, depression, easy bruising, fevers, chills, night  sweats, weight loss, nausea, vomiting, diarrhea, constipation, diplopia, blurred vision, headache, chest pain, palpitations, shortness of breath, breast pain, abdominal pain, extremity pain, or difficulty ambulating.  The remainder of the ten-point ROS, including general, skin, lymph, H/N, cardiorespiratory, GI, , Neuro, Endocrine, and psychiatric is negative.     Objective:      Physical Exam    He is alert, oriented to time, place, person, pleasant, well      nourished, in no acute physical distress.  He is here with his wife.                                  VITAL SIGNS:  Reviewed                                      HEENT:  Normal.  There are no nasal, oral, lip, gingival, auricular, lid,    or conjunctival lesions.  Mucosae are moist and pink, and there is no        thrush.  Pupils are equal, reactive to light and accommodation.              Extraocular muscle movements are intact.     Dentition is good.                                  NECK:  Supple without JVD, adenopathy, or thyromegaly.                       LUNGS:  Clear to auscultation without wheezing, rales, or rhonchi.           CARDIOVASCULAR:  Reveals an S1, S2, no murmurs, no rubs, no gallops.         ABDOMEN:  Soft, nontender, without organomegaly.  Bowel sounds are  present.  A scar from his left nephrectomy is identified.     Scars from bilateral varicocele surgeries are  identified.                                                                 EXTREMITIES:  No cyanosis, clubbing, or edema.                                                               LYMPHATIC:  There is no cervical, axillary, or supraclavicular adenopathy.   SKIN:  Warm and moist, without petechiae, rashes, induration, or ecchymoses.           NEUROLOGIC:  DTRs are 0-1+ bilaterally, symmetrical, motor function is 5/5,  and cranial nerves are  within normal limits.  Both fundi are sharp.    Assessment:       1. Renal cell carcinoma of kidney excluding renal pelvis    2. Liver metastases    3. Encounter for antineoplastic chemotherapy      4.    CRF  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  He will continue the sutent at 37.5 mg/ daily x 1 week, and receive his Cycle 2 day 8 infusion of gemcitabine today.  He may take zofran prn for nausea, and will see me in 2 weeks with a repeat CBC for his C3 D1  chemotherapy. Neutropenic precautions explained.  He will be restaged after three cycles.  .  His multiple questions were answered to his satisfaction.

## 2017-08-17 ENCOUNTER — LAB VISIT (OUTPATIENT)
Dept: LAB | Facility: HOSPITAL | Age: 67
End: 2017-08-17
Attending: INTERNAL MEDICINE
Payer: MEDICARE

## 2017-08-17 ENCOUNTER — OFFICE VISIT (OUTPATIENT)
Dept: HEMATOLOGY/ONCOLOGY | Facility: CLINIC | Age: 67
End: 2017-08-17
Payer: MEDICARE

## 2017-08-17 ENCOUNTER — INFUSION (OUTPATIENT)
Dept: INFUSION THERAPY | Facility: HOSPITAL | Age: 67
End: 2017-08-17
Attending: INTERNAL MEDICINE
Payer: MEDICARE

## 2017-08-17 VITALS
HEART RATE: 82 BPM | DIASTOLIC BLOOD PRESSURE: 87 MMHG | WEIGHT: 191.38 LBS | SYSTOLIC BLOOD PRESSURE: 143 MMHG | BODY MASS INDEX: 29.97 KG/M2 | TEMPERATURE: 98 F

## 2017-08-17 VITALS
DIASTOLIC BLOOD PRESSURE: 74 MMHG | HEART RATE: 79 BPM | SYSTOLIC BLOOD PRESSURE: 131 MMHG | HEIGHT: 71 IN | RESPIRATION RATE: 17 BRPM | BODY MASS INDEX: 26.46 KG/M2 | WEIGHT: 189 LBS | TEMPERATURE: 97 F

## 2017-08-17 DIAGNOSIS — C64.9 RENAL CELL CARCINOMA OF KIDNEY EXCLUDING RENAL PELVIS: Primary | ICD-10-CM

## 2017-08-17 DIAGNOSIS — Z51.11 ENCOUNTER FOR ANTINEOPLASTIC CHEMOTHERAPY: ICD-10-CM

## 2017-08-17 DIAGNOSIS — C78.7 LIVER METASTASES: ICD-10-CM

## 2017-08-17 LAB
ALBUMIN SERPL BCP-MCNC: 2.6 G/DL
ALP SERPL-CCNC: 136 U/L
ALT SERPL W/O P-5'-P-CCNC: 37 U/L
ANION GAP SERPL CALC-SCNC: 8 MMOL/L
AST SERPL-CCNC: 36 U/L
BILIRUB SERPL-MCNC: 0.4 MG/DL
BUN SERPL-MCNC: 22 MG/DL
CALCIUM SERPL-MCNC: 9 MG/DL
CHLORIDE SERPL-SCNC: 106 MMOL/L
CO2 SERPL-SCNC: 25 MMOL/L
CREAT SERPL-MCNC: 2.3 MG/DL
ERYTHROCYTE [DISTWIDTH] IN BLOOD BY AUTOMATED COUNT: 15.9 %
EST. GFR  (AFRICAN AMERICAN): 33 ML/MIN/1.73 M^2
EST. GFR  (NON AFRICAN AMERICAN): 28.5 ML/MIN/1.73 M^2
GLUCOSE SERPL-MCNC: 115 MG/DL
HCT VFR BLD AUTO: 35.3 %
HGB BLD-MCNC: 11.7 G/DL
MCH RBC QN AUTO: 28.3 PG
MCHC RBC AUTO-ENTMCNC: 33.1 G/DL
MCV RBC AUTO: 85 FL
NEUTROPHILS # BLD AUTO: 1.4 K/UL
PLATELET # BLD AUTO: 138 K/UL
PMV BLD AUTO: 8.3 FL
POTASSIUM SERPL-SCNC: 4.7 MMOL/L
PROT SERPL-MCNC: 6.6 G/DL
RBC # BLD AUTO: 4.14 M/UL
SODIUM SERPL-SCNC: 139 MMOL/L
WBC # BLD AUTO: 2.88 K/UL

## 2017-08-17 PROCEDURE — 85027 COMPLETE CBC AUTOMATED: CPT

## 2017-08-17 PROCEDURE — 99999 PR PBB SHADOW E&M-EST. PATIENT-LVL III: CPT | Mod: PBBFAC,,, | Performed by: INTERNAL MEDICINE

## 2017-08-17 PROCEDURE — 63600175 PHARM REV CODE 636 W HCPCS: Performed by: INTERNAL MEDICINE

## 2017-08-17 PROCEDURE — 25000003 PHARM REV CODE 250: Performed by: INTERNAL MEDICINE

## 2017-08-17 PROCEDURE — 96367 TX/PROPH/DG ADDL SEQ IV INF: CPT

## 2017-08-17 PROCEDURE — 99213 OFFICE O/P EST LOW 20 MIN: CPT | Mod: PBBFAC | Performed by: INTERNAL MEDICINE

## 2017-08-17 PROCEDURE — 99215 OFFICE O/P EST HI 40 MIN: CPT | Mod: S$PBB,,, | Performed by: INTERNAL MEDICINE

## 2017-08-17 PROCEDURE — 36415 COLL VENOUS BLD VENIPUNCTURE: CPT

## 2017-08-17 PROCEDURE — 3079F DIAST BP 80-89 MM HG: CPT | Mod: ,,, | Performed by: INTERNAL MEDICINE

## 2017-08-17 PROCEDURE — 80053 COMPREHEN METABOLIC PANEL: CPT

## 2017-08-17 PROCEDURE — 3077F SYST BP >= 140 MM HG: CPT | Mod: ,,, | Performed by: INTERNAL MEDICINE

## 2017-08-17 PROCEDURE — 1126F AMNT PAIN NOTED NONE PRSNT: CPT | Mod: ,,, | Performed by: INTERNAL MEDICINE

## 2017-08-17 PROCEDURE — 96413 CHEMO IV INFUSION 1 HR: CPT

## 2017-08-17 PROCEDURE — 1159F MED LIST DOCD IN RCRD: CPT | Mod: ,,, | Performed by: INTERNAL MEDICINE

## 2017-08-17 RX ORDER — SODIUM CHLORIDE 0.9 % (FLUSH) 0.9 %
10 SYRINGE (ML) INJECTION
Status: CANCELLED | OUTPATIENT
Start: 2017-08-17

## 2017-08-17 RX ORDER — HEPARIN 100 UNIT/ML
500 SYRINGE INTRAVENOUS
Status: CANCELLED | OUTPATIENT
Start: 2017-08-17

## 2017-08-17 RX ADMIN — SODIUM CHLORIDE: 0.9 INJECTION, SOLUTION INTRAVENOUS at 10:08

## 2017-08-17 RX ADMIN — DEXAMETHASONE SODIUM PHOSPHATE 10 MG: 4 INJECTION, SOLUTION INTRAMUSCULAR; INTRAVENOUS at 10:08

## 2017-08-17 RX ADMIN — GEMCITABINE HYDROCHLORIDE 2000 MG: 200 INJECTION, POWDER, LYOPHILIZED, FOR SOLUTION INTRAVENOUS at 10:08

## 2017-08-17 NOTE — PLAN OF CARE
Problem: Patient Care Overview  Goal: Plan of Care Review  Outcome: Ongoing (interventions implemented as appropriate)  Tolerated treatment well.  Advised to call MD for any problems or concerns.  AVS given.  RTC in 2 weeks. NAD noted upon discharge.  Encouraged to increase fluid intake.  Pt voiced understanding.

## 2017-08-30 NOTE — PROGRESS NOTES
Subjective:       Patient ID: Bud Russo III is a 66 y.o. male.    Chief Complaint: No chief complaint on file.    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  He is also on tarceva 37.5 mg daily which he takes for two weeks every 3 weeks.  He started it again this am.  His CBC shows a WBC count of 2,800 /mm3, Hg 11.7 gr/dl, Ht 35.3 % and platelets 138,000 /mm3.  His creatinine is 2.3 mg/dl, and his K is 4.7 mEq/L.    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  His diagnosis was established on April 30th when he presented to an Emergency Room in [place], [place], where he lived with left flank pain.  Radiologic studies showed a mass in his left kidney.  Subsequently, he went to [place], [place], where he was staged and was found to have a mass in his liver.  On 05/23/2017, he   underwent an embolization of his tumor in preparation for a possible liver resection; and on 06/06/2017, he underwent a left nephrectomy.  He remained in the [place] area in preparation of a liver resection; however, an MRI of his abdomen and pelvis that was done last week showed that there was significant progression of the metastatic liver disease with interval increase in size of a dominant right hepatic lobe mass from 6.1 cm previously to 11.2 cm with evidence of internal necrosis and peripheral enhancement.  In addition, multiple new lesions were identified in both hepatic lobes, measuring up to 1.9 cm in the left lateral lobe and 1.2 cm in the right hepatic lobe.  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  The patient met with an oncologist at [place], and he was offered chemotherapy.  He had decided to return to [place] and receive chemotherapy at our facility.  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  He started chemotherapy 5 weeks ago, and he is due for his C2 D8 gemcitabine today.    Review of Systems      Overall he feels OK. He tolerated the last round of chemotherapy quite well and did not experience any significant side effects.  He states that for the last 8 days he has not had any low grade fevers,  And was able to drive himself to [place] and back.  No chills.   His energy level is good.  He denies any anxiety, depression, easy bruising, fevers, chills, night  sweats, weight loss, nausea, vomiting, diarrhea, constipation, diplopia, blurred vision, headache, chest pain, palpitations, shortness of breath, breast pain, abdominal pain, extremity pain, or difficulty ambulating.  The remainder of the ten-point ROS, including general, skin, lymph, H/N, cardiorespiratory, GI, , Neuro, Endocrine, and psychiatric is negative.     Objective:      Physical Exam    He is alert, oriented to time, place, person, pleasant, well      nourished, in no acute physical distress.  He is here with his wife.                                  VITAL SIGNS:  Reviewed                                      HEENT:  Normal.  There are no nasal, oral, lip, gingival, auricular, lid,    or conjunctival lesions.  Mucosae are moist and pink, and there is no        thrush.  Pupils are equal, reactive to light and accommodation.              Extraocular muscle movements are intact.     Dentition is good.                                  NECK:  Supple without JVD, adenopathy, or thyromegaly.                       LUNGS:  Clear to auscultation without wheezing, rales, or rhonchi.           CARDIOVASCULAR:  Reveals an S1, S2, no murmurs, no rubs, no gallops.         ABDOMEN:  Soft, nontender, without organomegaly.  Bowel sounds are  present.  A scar from his left nephrectomy is identified.     Scars from bilateral varicocele surgeries are identified.                                                                  EXTREMITIES:  No cyanosis, clubbing, or edema.                                                               LYMPHATIC:  There is no cervical, axillary, or supraclavicular adenopathy.   SKIN:  Warm and moist, without petechiae, rashes, induration, or ecchymoses.           NEUROLOGIC:  DTRs are 0-1+ bilaterally, symmetrical, motor function is 5/5,  and cranial nerves are  within normal limits.  Both fundi are sharp.    Assessment:       1. Renal cell carcinoma of kidney excluding renal pelvis    2. Liver metastases    3. Encounter for antineoplastic chemotherapy      4.    CRF  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  He will remain on sutent at 37.5 mg/ daily x 2 weeks, and receive his Cycle 3 day 1 infusion of gemcitabine today.  He may take zofran prn for nausea, and will see me in 1 weeks with a repeat CBC for his C3 D8  chemotherapy. Neutropenic precautions explained.  He will be restaged two weeks from now.  .  His multiple questions were answered to his satisfaction.

## 2017-08-31 ENCOUNTER — OFFICE VISIT (OUTPATIENT)
Dept: HEMATOLOGY/ONCOLOGY | Facility: CLINIC | Age: 67
End: 2017-08-31
Payer: MEDICARE

## 2017-08-31 ENCOUNTER — INFUSION (OUTPATIENT)
Dept: INFUSION THERAPY | Facility: HOSPITAL | Age: 67
End: 2017-08-31
Attending: INTERNAL MEDICINE
Payer: MEDICARE

## 2017-08-31 VITALS
HEART RATE: 73 BPM | DIASTOLIC BLOOD PRESSURE: 78 MMHG | BODY MASS INDEX: 27.24 KG/M2 | TEMPERATURE: 98 F | WEIGHT: 195.31 LBS | SYSTOLIC BLOOD PRESSURE: 127 MMHG

## 2017-08-31 VITALS — HEIGHT: 71 IN | WEIGHT: 195.31 LBS | BODY MASS INDEX: 27.34 KG/M2

## 2017-08-31 DIAGNOSIS — Z51.11 ENCOUNTER FOR ANTINEOPLASTIC CHEMOTHERAPY: ICD-10-CM

## 2017-08-31 DIAGNOSIS — C64.9 RENAL CELL CARCINOMA OF KIDNEY EXCLUDING RENAL PELVIS: Primary | ICD-10-CM

## 2017-08-31 DIAGNOSIS — C78.7 LIVER METASTASES: ICD-10-CM

## 2017-08-31 PROCEDURE — 96413 CHEMO IV INFUSION 1 HR: CPT

## 2017-08-31 PROCEDURE — 96367 TX/PROPH/DG ADDL SEQ IV INF: CPT

## 2017-08-31 PROCEDURE — 99999 PR PBB SHADOW E&M-EST. PATIENT-LVL III: CPT | Mod: PBBFAC,,, | Performed by: INTERNAL MEDICINE

## 2017-08-31 PROCEDURE — 1126F AMNT PAIN NOTED NONE PRSNT: CPT | Mod: ,,, | Performed by: INTERNAL MEDICINE

## 2017-08-31 PROCEDURE — 63600175 PHARM REV CODE 636 W HCPCS: Performed by: INTERNAL MEDICINE

## 2017-08-31 PROCEDURE — 3078F DIAST BP <80 MM HG: CPT | Mod: ,,, | Performed by: INTERNAL MEDICINE

## 2017-08-31 PROCEDURE — 25000003 PHARM REV CODE 250: Performed by: INTERNAL MEDICINE

## 2017-08-31 PROCEDURE — 1159F MED LIST DOCD IN RCRD: CPT | Mod: ,,, | Performed by: INTERNAL MEDICINE

## 2017-08-31 PROCEDURE — 3074F SYST BP LT 130 MM HG: CPT | Mod: ,,, | Performed by: INTERNAL MEDICINE

## 2017-08-31 PROCEDURE — 99215 OFFICE O/P EST HI 40 MIN: CPT | Mod: S$PBB,,, | Performed by: INTERNAL MEDICINE

## 2017-08-31 PROCEDURE — 99213 OFFICE O/P EST LOW 20 MIN: CPT | Mod: PBBFAC | Performed by: INTERNAL MEDICINE

## 2017-08-31 RX ORDER — SODIUM CHLORIDE 0.9 % (FLUSH) 0.9 %
10 SYRINGE (ML) INJECTION
Status: CANCELLED | OUTPATIENT
Start: 2017-09-07

## 2017-08-31 RX ORDER — SODIUM CHLORIDE 0.9 % (FLUSH) 0.9 %
10 SYRINGE (ML) INJECTION
Status: CANCELLED | OUTPATIENT
Start: 2017-08-31

## 2017-08-31 RX ORDER — SODIUM CHLORIDE 0.9 % (FLUSH) 0.9 %
10 SYRINGE (ML) INJECTION
Status: DISCONTINUED | OUTPATIENT
Start: 2017-08-31 | End: 2017-08-31 | Stop reason: HOSPADM

## 2017-08-31 RX ORDER — HEPARIN 100 UNIT/ML
500 SYRINGE INTRAVENOUS
Status: CANCELLED | OUTPATIENT
Start: 2017-09-07

## 2017-08-31 RX ORDER — HEPARIN 100 UNIT/ML
500 SYRINGE INTRAVENOUS
Status: CANCELLED | OUTPATIENT
Start: 2017-08-31

## 2017-08-31 RX ADMIN — DEXAMETHASONE SODIUM PHOSPHATE 10 MG: 4 INJECTION, SOLUTION INTRAMUSCULAR; INTRAVENOUS at 10:08

## 2017-08-31 RX ADMIN — GEMCITABINE HYDROCHLORIDE 2110 MG: 200 INJECTION, POWDER, LYOPHILIZED, FOR SOLUTION INTRAVENOUS at 11:08

## 2017-08-31 RX ADMIN — SODIUM CHLORIDE: 0.9 INJECTION, SOLUTION INTRAVENOUS at 10:08

## 2017-08-31 NOTE — PLAN OF CARE
Problem: Patient Care Overview  Goal: Plan of Care Review  Outcome: Ongoing (interventions implemented as appropriate)  Patient completed gemzar with NAD noted upon discharge. Slowed rate down per patient's request. PIV, +blood return, heat applied to site during infusion. Verbalized understanding to call MD for any questions/concerns. AVS given. PIV removed, catheter tip intact. Discharged home, ambulated independently with wife by side.

## 2017-09-06 NOTE — PROGRESS NOTES
Subjective:       Patient ID: Bud Russo III is a 66 y.o. male.    Chief Complaint: No chief complaint on file.    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  He is also on tarceva 37.5 mg daily which he takes for two weeks every 3 weeks.  He started it again a week ago, on C3 D1..  His CBC shows a WBC count of 3,700 /mm3, Hg 11.2 gr/dl, Ht 33.8 % and platelets 167,000 /mm3.  His creatinine is 2.3 mg/dl, and his K is 4.7 mEq/L.    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  His diagnosis was established on April 30th when he presented to an Emergency Room in [place], [place], where he lived with left flank pain.  Radiologic studies showed a mass in his left kidney.  Subsequently, he went to [place], [place], where he was staged and was found to have a mass in his liver.  On 05/23/2017, he   underwent an embolization of his tumor in preparation for a possible liver resection; and on 06/06/2017, he underwent a left nephrectomy.  He remained in the [place] area in preparation of a liver resection; however, an MRI of his abdomen and pelvis that was done last week showed that there was significant progression of the metastatic liver disease with interval increase in size of a dominant right hepatic lobe mass from 6.1 cm previously to 11.2 cm with evidence of internal necrosis and peripheral enhancement.  In addition, multiple new lesions were identified in both hepatic lobes, measuring up to 1.9 cm in the left lateral lobe and 1.2 cm in the right hepatic lobe.  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  The patient met with an oncologist at [place] [facility], and he was offered chemotherapy.  He had decided to return to [place] and receive chemotherapy at our facility.  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  He started chemotherapy 2 months ago, and he is due for his C3 D8 gemcitabine today.    Review of Systems      Overall he feels OK. He tolerated the last round of chemotherapy quite well and did not experience any significant side effects.  He states that for the last week he has not had any low grade fevers or chills.   His energy level is good.  He denies any anxiety, depression, easy bruising, fevers, chills, night  sweats, weight loss, nausea, vomiting, diarrhea, constipation, diplopia, blurred vision, headache, chest pain, palpitations, shortness of breath, breast pain, abdominal pain, extremity pain, or difficulty ambulating.  The remainder of the ten-point ROS, including general, skin, lymph, H/N, cardiorespiratory, GI, , Neuro, Endocrine, and psychiatric is negative.     Objective:      Physical Exam    He is alert, oriented to time, place, person, pleasant, well      nourished, in no acute physical distress.  He is here with his wife.                                  VITAL SIGNS:  Reviewed                                      HEENT:  Normal.  There are no nasal, oral, lip, gingival, auricular, lid,    or conjunctival lesions.  Mucosae are moist and pink, and there is no        thrush.  Pupils are equal, reactive to light and accommodation.              Extraocular muscle movements are intact.     Dentition is good.                                  NECK:  Supple without JVD, adenopathy, or thyromegaly.                       LUNGS:  Clear to auscultation without wheezing, rales, or rhonchi.           CARDIOVASCULAR:  Reveals an S1, S2, no murmurs, no rubs, no gallops.         ABDOMEN:  Soft, nontender, without organomegaly.  Bowel sounds are  present.  A scar from his left nephrectomy is identified.     Scars from bilateral varicocele surgeries are identified.                                                                  EXTREMITIES:  No cyanosis, clubbing, or edema.                                                               LYMPHATIC:  There is no cervical, axillary, or supraclavicular adenopathy.   SKIN:  Warm and moist, without petechiae, rashes, induration, or ecchymoses.           NEUROLOGIC:  DTRs are 0-1+ bilaterally, symmetrical, motor function is 5/5,  and cranial nerves are  within normal limits.  Both fundi are sharp.    Assessment:       1. Renal cell carcinoma of kidney excluding renal pelvis    2. Liver metastases    3. Encounter for antineoplastic chemotherapy      4.    CRF  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  He will remain on sutent at 37.5 mg/ daily x 1 more week, and receive his Cycle 3 day 8 infusion of gemcitabine today.  He may take zofran prn for nausea, and will see me in 2 weeks with a repeat CBC for his C4 D8  chemotherapy. Neutropenic precautions explained.  He will be restaged a few days prior to his next visit.    His multiple questions were answered to his satisfaction.

## 2017-09-07 ENCOUNTER — INFUSION (OUTPATIENT)
Dept: INFUSION THERAPY | Facility: HOSPITAL | Age: 67
End: 2017-09-07
Attending: INTERNAL MEDICINE
Payer: MEDICARE

## 2017-09-07 ENCOUNTER — OFFICE VISIT (OUTPATIENT)
Dept: HEMATOLOGY/ONCOLOGY | Facility: CLINIC | Age: 67
End: 2017-09-07
Payer: MEDICARE

## 2017-09-07 VITALS
TEMPERATURE: 98 F | DIASTOLIC BLOOD PRESSURE: 80 MMHG | HEART RATE: 75 BPM | RESPIRATION RATE: 18 BRPM | SYSTOLIC BLOOD PRESSURE: 169 MMHG

## 2017-09-07 DIAGNOSIS — C64.9 RENAL CELL CARCINOMA OF KIDNEY EXCLUDING RENAL PELVIS: Primary | ICD-10-CM

## 2017-09-07 DIAGNOSIS — C78.7 LIVER METASTASES: ICD-10-CM

## 2017-09-07 DIAGNOSIS — Z51.11 ENCOUNTER FOR ANTINEOPLASTIC CHEMOTHERAPY: ICD-10-CM

## 2017-09-07 PROCEDURE — 63600175 PHARM REV CODE 636 W HCPCS: Performed by: INTERNAL MEDICINE

## 2017-09-07 PROCEDURE — 96367 TX/PROPH/DG ADDL SEQ IV INF: CPT

## 2017-09-07 PROCEDURE — 25000003 PHARM REV CODE 250: Performed by: INTERNAL MEDICINE

## 2017-09-07 PROCEDURE — 99215 OFFICE O/P EST HI 40 MIN: CPT | Mod: S$PBB,,, | Performed by: INTERNAL MEDICINE

## 2017-09-07 PROCEDURE — 99999 PR PBB SHADOW E&M-EST. PATIENT-LVL II: CPT | Mod: PBBFAC,,, | Performed by: INTERNAL MEDICINE

## 2017-09-07 PROCEDURE — 99212 OFFICE O/P EST SF 10 MIN: CPT | Mod: PBBFAC | Performed by: INTERNAL MEDICINE

## 2017-09-07 PROCEDURE — 1159F MED LIST DOCD IN RCRD: CPT | Mod: ,,, | Performed by: INTERNAL MEDICINE

## 2017-09-07 PROCEDURE — 96413 CHEMO IV INFUSION 1 HR: CPT

## 2017-09-07 PROCEDURE — 3078F DIAST BP <80 MM HG: CPT | Mod: ,,, | Performed by: INTERNAL MEDICINE

## 2017-09-07 PROCEDURE — 3077F SYST BP >= 140 MM HG: CPT | Mod: ,,, | Performed by: INTERNAL MEDICINE

## 2017-09-07 RX ORDER — HEPARIN 100 UNIT/ML
500 SYRINGE INTRAVENOUS
Status: DISCONTINUED | OUTPATIENT
Start: 2017-09-07 | End: 2017-09-07 | Stop reason: HOSPADM

## 2017-09-07 RX ORDER — SODIUM CHLORIDE 0.9 % (FLUSH) 0.9 %
10 SYRINGE (ML) INJECTION
Status: DISCONTINUED | OUTPATIENT
Start: 2017-09-07 | End: 2017-09-07 | Stop reason: HOSPADM

## 2017-09-07 RX ADMIN — SODIUM CHLORIDE: 9 INJECTION, SOLUTION INTRAVENOUS at 02:09

## 2017-09-07 RX ADMIN — DEXAMETHASONE SODIUM PHOSPHATE 10 MG: 4 INJECTION, SOLUTION INTRAMUSCULAR; INTRAVENOUS at 02:09

## 2017-09-07 RX ADMIN — GEMCITABINE HYDROCHLORIDE 2110 MG: 200 INJECTION, POWDER, LYOPHILIZED, FOR SOLUTION INTRAVENOUS at 03:09

## 2017-09-07 NOTE — PLAN OF CARE
Problem: Patient Care Overview  Goal: Plan of Care Review  Outcome: Ongoing (interventions implemented as appropriate)  5839 -- Patient tolerated treatment well.  Discharged without S/S of adverse effects.  AVS given.  Patient instructed to call provider with any questions or concerns.

## 2017-09-07 NOTE — Clinical Note
Ct, MRi and bone scan prior to seeing me. Those can be done at the [facility].  See me in two weeks with labs for chemo

## 2017-09-07 NOTE — PLAN OF CARE
Problem: Patient Care Overview  Goal: Individualization & Mutuality  Outcome: Ongoing (interventions implemented as appropriate)  6853 --  Patient's labs, history, allergies, and medication reviewed.  Patient to receive Gemzar.  Discussed plan of care with patient.  Patient in agreement.

## 2017-09-20 ENCOUNTER — HOSPITAL ENCOUNTER (OUTPATIENT)
Dept: RADIOLOGY | Facility: HOSPITAL | Age: 67
Discharge: HOME | End: 2017-09-20
Attending: INTERNAL MEDICINE
Payer: MEDICARE

## 2017-09-20 DIAGNOSIS — C64.9 RENAL CELL CARCINOMA OF KIDNEY EXCLUDING RENAL PELVIS: ICD-10-CM

## 2017-09-20 DIAGNOSIS — C78.7 LIVER METASTASES: ICD-10-CM

## 2017-09-20 PROCEDURE — 74181 MRI ABDOMEN W/O CONTRAST: CPT | Mod: TC,PO

## 2017-09-20 PROCEDURE — 71250 CT THORAX DX C-: CPT | Mod: 26,,, | Performed by: RADIOLOGY

## 2017-09-20 PROCEDURE — 78306 BONE IMAGING WHOLE BODY: CPT | Mod: 26,,, | Performed by: RADIOLOGY

## 2017-09-20 PROCEDURE — 71250 CT THORAX DX C-: CPT | Mod: TC,PO

## 2017-09-20 PROCEDURE — A9503 TC99M MEDRONATE: HCPCS | Mod: PO

## 2017-09-20 PROCEDURE — 74181 MRI ABDOMEN W/O CONTRAST: CPT | Mod: 26,,, | Performed by: RADIOLOGY

## 2017-09-20 NOTE — PROGRESS NOTES
Subjective:       Patient ID: Bud Russo III is a 66 y.o. male.    Chief Complaint: No chief complaint on file.    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  He has so far received 3 cycles of gemcitabine chemotherapy.  He is also on tarceva 37.5 mg daily which he takes for two weeks every 3 weeks.      His CBC shows a WBC count of 6,000 /mm3, Hg 10.2 gr/dl, Ht 31.3 % and platelets 386,000 /mm3.  His creatinine is 2.2 mg/dl, and his K is 4.7 mEq/L.  Bilirubin is normal, and alkaline phosphatase is 149 U/l.    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  His diagnosis was established on April 30th when he presented to an Emergency Room in [place], [place], where he lived with left flank pain.  Radiologic studies showed a mass in his left kidney.  Subsequently, he went to [place], [place], where he was staged and was found to have a mass in his liver.  On 05/23/2017, he   underwent an embolization of his tumor in preparation for a possible liver resection; and on 06/06/2017, he underwent a left nephrectomy.  He remained in the [place] area in preparation of a liver resection; however, an MRI of his abdomen and pelvis that was done last week showed that there was significant progression of the metastatic liver disease with interval increase in size of a dominant right hepatic lobe mass from 6.1 cm previously to 11.2 cm with evidence of internal necrosis and peripheral enhancement.  In addition, multiple new lesions were identified in both hepatic lobes, measuring up to 1.9 cm in the left lateral lobe and 1.2 cm in the right hepatic lobe.  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  The patient met with an oncologist at [place], and he was offered chemotherapy.  He had decided to return to [place] and receive chemotherapy at our facility.  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  He started chemotherapy 2 months ago, and has completed three cycles.   Unfortunately the staging MRI scan shows that compared to his prior CT of July 19, 2017 there has been an interval increase in number and size of the foci of hepatic metastatic disease within the liver    Review of Systems      Overall he feels OK. He tolerated the last round of chemotherapy quite well and did not experience any significant side effects.  He states that for the last 2 weeks he has not had any low grade fevers or chills.   His energy level is good.  He denies any anxiety, depression, easy bruising, fevers, chills, night  sweats, weight loss, nausea, vomiting, diarrhea, constipation, diplopia, blurred vision, headache, chest pain, palpitations, shortness of breath, breast pain, abdominal pain, extremity pain, or difficulty ambulating.  The remainder of the ten-point ROS, including general, skin, lymph, H/N, cardiorespiratory, GI, , Neuro, Endocrine, and psychiatric is negative.     Objective:      Physical Exam    He is alert, oriented to time, place, person, pleasant, well      nourished, in no acute physical distress.  He is here with his wife.                                  VITAL SIGNS:  Reviewed                                      HEENT:  Normal.  There are no nasal, oral, lip, gingival, auricular, lid,    or conjunctival lesions.  Mucosae are moist and pink, and there is no        thrush.  Pupils are equal, reactive to light and accommodation.              Extraocular muscle movements are intact.     Dentition is good.                                  NECK:  Supple without JVD, adenopathy, or thyromegaly.                       LUNGS:  Clear to auscultation without wheezing, rales, or rhonchi.           CARDIOVASCULAR:  Reveals an S1, S2, no murmurs,  no rubs, no gallops.         ABDOMEN:  Soft, nontender, without organomegaly.  Bowel sounds are  present.  A scar from his left nephrectomy is identified.     Scars from bilateral varicocele surgeries are identified.                                                                 EXTREMITIES:  No cyanosis, clubbing, or edema.                                                               LYMPHATIC:  There is no cervical, axillary, or supraclavicular adenopathy.   SKIN:  Warm and moist, without petechiae, rashes, induration, or ecchymoses.           NEUROLOGIC:  DTRs are 0-1+ bilaterally, symmetrical, motor function is 5/5,  and cranial nerves are  within normal limits.  Both fundi are sharp.    Assessment:       1. Renal cell carcinoma of kidney excluding renal pelvis    2. CRF    3. Liver metastases    4. 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 I have also consulted with his oncologist at [facility].  He will be treated with the combination of lenvatinib and afinitor, with starting doses of 10 mg and 5 mg respectively.  We will attempt to escalate his lenvatinib dose after 3-4 weeks.  I have initiated the authorization process and will see Dr. Russo back in three weeks with repeat labs.  This appointment may change depending on when he is able to start the above combination.    His multiple questions were answered to his satisfaction.

## 2017-09-21 ENCOUNTER — LAB VISIT (OUTPATIENT)
Dept: LAB | Facility: HOSPITAL | Age: 67
End: 2017-09-21
Attending: INTERNAL MEDICINE
Payer: MEDICARE

## 2017-09-21 ENCOUNTER — OFFICE VISIT (OUTPATIENT)
Dept: HEMATOLOGY/ONCOLOGY | Facility: CLINIC | Age: 67
End: 2017-09-21
Payer: MEDICARE

## 2017-09-21 VITALS — SYSTOLIC BLOOD PRESSURE: 140 MMHG | DIASTOLIC BLOOD PRESSURE: 66 MMHG | TEMPERATURE: 99 F | HEART RATE: 84 BPM

## 2017-09-21 DIAGNOSIS — Z51.11 ENCOUNTER FOR ANTINEOPLASTIC CHEMOTHERAPY: ICD-10-CM

## 2017-09-21 DIAGNOSIS — C78.7 LIVER METASTASES: ICD-10-CM

## 2017-09-21 DIAGNOSIS — C64.9 RENAL CELL CARCINOMA, UNSPECIFIED LATERALITY: ICD-10-CM

## 2017-09-21 DIAGNOSIS — C64.9 RENAL CELL CARCINOMA OF KIDNEY EXCLUDING RENAL PELVIS: ICD-10-CM

## 2017-09-21 DIAGNOSIS — C64.9 RENAL CELL CARCINOMA OF KIDNEY EXCLUDING RENAL PELVIS: Primary | ICD-10-CM

## 2017-09-21 LAB
ALBUMIN SERPL BCP-MCNC: 2.6 G/DL
ALP SERPL-CCNC: 149 U/L
ALT SERPL W/O P-5'-P-CCNC: 17 U/L
ANION GAP SERPL CALC-SCNC: 8 MMOL/L
AST SERPL-CCNC: 28 U/L
BILIRUB SERPL-MCNC: 0.7 MG/DL
BUN SERPL-MCNC: 17 MG/DL
CALCIUM SERPL-MCNC: 8.8 MG/DL
CHLORIDE SERPL-SCNC: 103 MMOL/L
CO2 SERPL-SCNC: 26 MMOL/L
CREAT SERPL-MCNC: 2.2 MG/DL
ERYTHROCYTE [DISTWIDTH] IN BLOOD BY AUTOMATED COUNT: 20.1 %
EST. GFR  (AFRICAN AMERICAN): 34.8 ML/MIN/1.73 M^2
EST. GFR  (NON AFRICAN AMERICAN): 30.1 ML/MIN/1.73 M^2
GLUCOSE SERPL-MCNC: 88 MG/DL
HCT VFR BLD AUTO: 31.3 %
HGB BLD-MCNC: 10.2 G/DL
MCH RBC QN AUTO: 29.6 PG
MCHC RBC AUTO-ENTMCNC: 32.6 G/DL
MCV RBC AUTO: 91 FL
NEUTROPHILS # BLD AUTO: 3 K/UL
PLATELET # BLD AUTO: 386 K/UL
PMV BLD AUTO: 8.8 FL
POTASSIUM SERPL-SCNC: 4.7 MMOL/L
PROT SERPL-MCNC: 7 G/DL
RBC # BLD AUTO: 3.45 M/UL
SODIUM SERPL-SCNC: 137 MMOL/L
WBC # BLD AUTO: 6.02 K/UL

## 2017-09-21 PROCEDURE — 80053 COMPREHEN METABOLIC PANEL: CPT

## 2017-09-21 PROCEDURE — 99211 OFF/OP EST MAY X REQ PHY/QHP: CPT | Mod: PBBFAC | Performed by: INTERNAL MEDICINE

## 2017-09-21 PROCEDURE — 99999 PR PBB SHADOW E&M-EST. PATIENT-LVL I: CPT | Mod: PBBFAC,,, | Performed by: INTERNAL MEDICINE

## 2017-09-21 PROCEDURE — 1159F MED LIST DOCD IN RCRD: CPT | Mod: ,,, | Performed by: INTERNAL MEDICINE

## 2017-09-21 PROCEDURE — 85027 COMPLETE CBC AUTOMATED: CPT

## 2017-09-21 PROCEDURE — 99215 OFFICE O/P EST HI 40 MIN: CPT | Mod: S$PBB,,, | Performed by: INTERNAL MEDICINE

## 2017-09-21 RX ORDER — EVEROLIMUS 5 MG/1
5 TABLET ORAL DAILY
Qty: 30 TABLET | Refills: 3 | Status: SHIPPED | OUTPATIENT
Start: 2017-09-21 | End: 2018-01-16 | Stop reason: SDUPTHER

## 2017-09-21 RX ORDER — DEXAMETHASONE 0.5 MG/5ML
SOLUTION ORAL
Qty: 240 ML | Refills: 1 | Status: SHIPPED | OUTPATIENT
Start: 2017-09-21 | End: 2017-09-21 | Stop reason: SDUPTHER

## 2017-09-21 RX ORDER — EVEROLIMUS 5 MG/1
5 TABLET ORAL DAILY
Qty: 30 TABLET | Refills: 3 | Status: SHIPPED | OUTPATIENT
Start: 2017-09-21 | End: 2017-09-21 | Stop reason: SDUPTHER

## 2017-09-21 RX ORDER — DEXAMETHASONE 0.5 MG/5ML
SOLUTION ORAL
Qty: 240 ML | Refills: 1 | Status: SHIPPED | OUTPATIENT
Start: 2017-09-21 | End: 2018-01-16 | Stop reason: SDUPTHER

## 2017-09-22 ENCOUNTER — TELEPHONE (OUTPATIENT)
Dept: HEMATOLOGY/ONCOLOGY | Facility: CLINIC | Age: 67
End: 2017-09-22

## 2017-09-22 ENCOUNTER — TELEPHONE (OUTPATIENT)
Dept: PHARMACY | Facility: CLINIC | Age: 67
End: 2017-09-22

## 2017-09-22 NOTE — TELEPHONE ENCOUNTER
[facility] received prescription for:  Afinitor 5 mg daily  Lenvima 10 mg daily       Prior authorization is required.      Patient's Estimated Creatinine Clearance: 35.2 mL/min (based on SCr of 2.2 mg/dL (H)). Dosage adjustment is not required.    Dosage appropriate based on therapy for Renal cell carcinoma, advanced (off-label dose/combination):   Afinitor oral: 5 mg once daily, continue until disease progression or unacceptable toxicity (Kong 2015)  Lenvima: Oral: 18 mg once daily, continue until disease progression or unacceptable toxicity (Kong 2015)  -Reduced to 10 mg daily to monitor tolerability and escalate after 3-4 weeks.     Dexamethasone solution prescribed to be used for mouth sores with Afinitor     DDIs: medication list reviewed  Potential increased risk for QTc prolongation with Lenvima and Zofran - monitor patient

## 2017-09-22 NOTE — TELEPHONE ENCOUNTER
[facility] received a prescription for Afinitor and Lenvima and it will require a prior authorization with their insurance company. We will update patient of status as more information is received.

## 2017-09-26 NOTE — TELEPHONE ENCOUNTER
DOCUMENTATION ONLY   PA was submitted to ins.  PA approved until 9/2020  Copay total: $ 1349.05  Researching assistance

## 2017-10-02 NOTE — TELEPHONE ENCOUNTER
FOR DOCUMENTATION ONLY:  Financial Assistance for Afinator & Lenvima is approved from 10-2-17 to 12-31-17  [employer] Assistance Fund  BIN: [id number]  CAMILA: VALENCIA  Id: [id number]  GRP: [id number]

## 2017-10-03 ENCOUNTER — TELEPHONE (OUTPATIENT)
Dept: HEMATOLOGY/ONCOLOGY | Facility: CLINIC | Age: 67
End: 2017-10-03

## 2017-10-03 DIAGNOSIS — Z51.11 ENCOUNTER FOR ANTINEOPLASTIC CHEMOTHERAPY: Primary | ICD-10-CM

## 2017-10-03 NOTE — TELEPHONE ENCOUNTER
Initial Afinitor/Lenvima consult completed on 10/3/2017 . Medications will be shipped on 10/3/2017 to arrive at patient's home on 10/4/2017 via [facility]. $23.30 copay. Patient plans to start Lenvima/Afinitor after provider visit planned for 10/5/2017. Address confirmed. Confirmed 2 patient identifiers - name and . Therapy Appropriate.     Patient was counseled on the administration directions:  Lenvima  -Take 1 capsule (10mg) by mouth once daily, with or without food  Afinitor  - Take 1 tablet (5mg) by mouth once daily with or without food, at the same time of the day.   Dexamethasone solution  -Swish and swallow 0.5mg twice daily    -Do not chew, crush, or break the tablets.   If possible, patient was instructed tip the tablets from the RX bottle to the cap, and take directly from the cap to the mouth.  Patient may handle the medication with their hands if they wear with a latex or nitrile glove and wash their hands before and after handling the tablets.    Patient was counseled on the following possible side effects, which include, but are not limited to:  Hypertension, nausea, vomiting, diarrhea, decreased appetite, skin rash, hand foot syndrome, swelling, fatigue, headache, hyperglycemia, mouth sores, increased risk for infection and bleeding.  Patient given hydrocortisone cream for dermatitis and Eucerin for hand-foot syndrome.    DDIs:  Medication list reviewed, monitor for QTc prolongation.    Patient was given 2 patient education handouts on how to handle oral chemotherapy and specific recommendations- do's and don'ts. Instructed the patient that if they have any remaining oral chemotherapy, not to flush down the toilet or throw away in the trash; The patient or caregiver should return the unused oral chemotherapy to either the clinic or to myself in the Pharmacy where the oral chemotherapy can be disposed of properly.     Patient confirmed understanding. Patient did not have additional questions.  Patient  plans to start Afinitor/Lenvima after provider visit planned for 10/5/2017. Consultation included: indication; goals of treatment; administration; storage and handling; side effects; how to handle side effects; the importance of compliance; how to handle missed doses; the importance of laboratory monitoring; the importance of keeping all follow up appointments.  Patient understands to report any medication changes to [facility] and provider. All questions answered and addressed to patients satisfaction. I will f/u with patient in 1 week from start, [facility] to contact patient in 3 weeks for refills.

## 2017-10-04 NOTE — PROGRESS NOTES
Subjective:       Patient ID: Bud Russo III is a 66 y.o. male.    Chief Complaint: No chief complaint on file.    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  His creatinine is 2.3 mg/dl, and his albumin is 2.2 gr/dl.  .  Bilirubin is normal, and alkaline phosphatase is 240 U/l.    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  His diagnosis was established on April 30th when he presented to an Emergency Room in [place], [place], where he lived with left flank pain.  Radiologic studies showed a mass in his left kidney.  Subsequently, he went to [place], [place], where he was staged and was found to have a mass in his liver.  On 05/23/2017, he   underwent an embolization of his tumor in preparation for a possible liver resection; and on 06/06/2017, he underwent a left nephrectomy.  He remained in the [place] area in preparation of a liver resection; however, an MRI of his abdomen and pelvis that was done last week showed that there was significant progression of the metastatic liver disease with interval increase in size of a dominant right hepatic lobe mass from 6.1 cm previously to 11.2 cm with evidence of internal necrosis and peripheral enhancement.  In addition, multiple new lesions were identified in both hepatic lobes, measuring up to 1.9 cm in the left lateral lobe and 1.2 cm in the right hepatic lobe.  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  The patient met with an oncologist at [facility], and he was offered chemotherapy.  He had decided to return to [place] and receive chemotherapy at our facility.  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  He started chemotherapy 2 months ago, and has completed three cycles.   Unfortunately the staging MRI scan shows that compared to his prior CT of July 19, 2017 there has been an interval increase in number and size of the foci of hepatic metastatic disease within the liver.    He will start the comnbination of lenvatinib plus everolimus.    Review of Systems      Overall he feels fair. He appears depressed and states that he has no luann in his life. His appetite is diminished, and he keeps loosing weight.  He denies any pain, and states that his abdominal pain resolved once his constipation was taken care of.    He states that for the last 2 weeks he has had any low grade fevers up to 100.5.  No chills.     He denies any anxiety, depression, easy bruising, fevers, chills, night  sweats, weight loss, nausea, vomiting, diarrhea, constipation, diplopia, blurred vision, headache, chest pain, palpitations, shortness of breath, breast pain, abdominal pain, extremity pain, or difficulty ambulating.  The remainder of the ten-point ROS, including general, skin, lymph, H/N, cardiorespiratory, GI, , Neuro, Endocrine, and psychiatric is negative.     Objective:      Physical Exam    He is alert, oriented to time, place, person, pleasant, well      nourished, in no acute physical distress.  He is here with his wife.                                  VITAL SIGNS:  Reviewed                                      HEENT:  Normal.  There are no nasal, oral, lip, gingival, auricular, lid,    or conjunctival lesions.  Mucosae are moist and pink, and there is no        thrush.  Pupils are equal, reactive to light and accommodation.              Extraocular muscle movements are intact.     Dentition is good.                                  NECK:  Supple without JVD, adenopathy, or thyromegaly.                       LUNGS:  Clear to auscultation without wheezing, rales, or rhonchi.           CARDIOVASCULAR:  Reveals an S1, S2,  no murmurs, no rubs, no gallops.         ABDOMEN:  Soft, nontender, without organomegaly.  Bowel sounds are  present.  A scar from his left nephrectomy is identified.     Scars from bilateral varicocele surgeries are identified.                                                                 EXTREMITIES:  No cyanosis, clubbing, or edema.                                                               LYMPHATIC:  There is no cervical, axillary, or supraclavicular adenopathy.   SKIN:  Warm and moist, without petechiae, rashes, induration, or ecchymoses.           NEUROLOGIC:  DTRs are 0-1+ bilaterally, symmetrical, motor function is 5/5,  and cranial nerves are  within normal limits.  Both fundi are sharp.    Assessment:       1. Renal cell carcinoma of kidney excluding renal pelvis    2. CRF    3. Liver metastases    4. Encounter for antineoplastic chemotherapy     5.     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  He is not sure whetehr he wants to try a second line treatment.  He is concerned with his quality of life.  I explained to him that the decision is up to him, and if he decides to proceed will be treated with the combination of lenvatinib and afinitor, with starting doses of 10 mg and 5 mg respectively.  We will attempt to escalate his lenvatinib dose after 3-4 weeks if he tolerates it.  He has been cautioned about the potential side effects of the combination.  I have asked him to obtain labs in a week at the [facility], and see me in 2 weeks.  We will also ask Dr. Bishop to see him at the time of his next visit, and will empirically start him on antidepressants (paroxetine 20 mg daily).  His multiple questions were answered to his satisfaction.

## 2017-10-05 ENCOUNTER — OFFICE VISIT (OUTPATIENT)
Dept: HEMATOLOGY/ONCOLOGY | Facility: CLINIC | Age: 67
End: 2017-10-05
Payer: MEDICARE

## 2017-10-05 ENCOUNTER — LAB VISIT (OUTPATIENT)
Dept: LAB | Facility: HOSPITAL | Age: 67
End: 2017-10-05
Attending: INTERNAL MEDICINE
Payer: MEDICARE

## 2017-10-05 VITALS
WEIGHT: 189.63 LBS | OXYGEN SATURATION: 98 % | TEMPERATURE: 99 F | BODY MASS INDEX: 29.76 KG/M2 | RESPIRATION RATE: 18 BRPM | HEIGHT: 67 IN | SYSTOLIC BLOOD PRESSURE: 160 MMHG | HEART RATE: 108 BPM | DIASTOLIC BLOOD PRESSURE: 79 MMHG

## 2017-10-05 DIAGNOSIS — Z51.11 ENCOUNTER FOR ANTINEOPLASTIC CHEMOTHERAPY: ICD-10-CM

## 2017-10-05 DIAGNOSIS — C64.9 RENAL CELL CARCINOMA, UNSPECIFIED LATERALITY: ICD-10-CM

## 2017-10-05 DIAGNOSIS — F33.0 MILD EPISODE OF RECURRENT MAJOR DEPRESSIVE DISORDER: ICD-10-CM

## 2017-10-05 DIAGNOSIS — C78.7 LIVER METASTASES: Primary | ICD-10-CM

## 2017-10-05 LAB
ALBUMIN SERPL BCP-MCNC: 2.2 G/DL
ALP SERPL-CCNC: 240 U/L
ALT SERPL W/O P-5'-P-CCNC: 30 U/L
ANION GAP SERPL CALC-SCNC: 9 MMOL/L
AST SERPL-CCNC: 61 U/L
BASOPHILS # BLD AUTO: 0.02 K/UL
BASOPHILS NFR BLD: 0.2 %
BILIRUB SERPL-MCNC: 0.7 MG/DL
BUN SERPL-MCNC: 24 MG/DL
CALCIUM SERPL-MCNC: 9.1 MG/DL
CHLORIDE SERPL-SCNC: 104 MMOL/L
CO2 SERPL-SCNC: 24 MMOL/L
CREAT SERPL-MCNC: 2.3 MG/DL
DIFFERENTIAL METHOD: ABNORMAL
EOSINOPHIL # BLD AUTO: 0 K/UL
EOSINOPHIL NFR BLD: 0.4 %
ERYTHROCYTE [DISTWIDTH] IN BLOOD BY AUTOMATED COUNT: 20.3 %
EST. GFR  (AFRICAN AMERICAN): 33 ML/MIN/1.73 M^2
EST. GFR  (NON AFRICAN AMERICAN): 28.5 ML/MIN/1.73 M^2
GLUCOSE SERPL-MCNC: 82 MG/DL
HCT VFR BLD AUTO: 28.8 %
HGB BLD-MCNC: 8.9 G/DL
LYMPHOCYTES # BLD AUTO: 1.7 K/UL
LYMPHOCYTES NFR BLD: 15.6 %
MCH RBC QN AUTO: 29.1 PG
MCHC RBC AUTO-ENTMCNC: 30.9 G/DL
MCV RBC AUTO: 94 FL
MONOCYTES # BLD AUTO: 1.7 K/UL
MONOCYTES NFR BLD: 16.3 %
NEUTROPHILS # BLD AUTO: 7.1 K/UL
NEUTROPHILS NFR BLD: 66.3 %
PLATELET # BLD AUTO: 306 K/UL
PMV BLD AUTO: 8.4 FL
POTASSIUM SERPL-SCNC: 5.1 MMOL/L
PROT SERPL-MCNC: 7.2 G/DL
RBC # BLD AUTO: 3.06 M/UL
SODIUM SERPL-SCNC: 137 MMOL/L
WBC # BLD AUTO: 10.66 K/UL

## 2017-10-05 PROCEDURE — 99215 OFFICE O/P EST HI 40 MIN: CPT | Mod: 25,S$PBB,, | Performed by: INTERNAL MEDICINE

## 2017-10-05 PROCEDURE — 80053 COMPREHEN METABOLIC PANEL: CPT

## 2017-10-05 PROCEDURE — 99999 PR PBB SHADOW E&M-EST. PATIENT-LVL III: CPT | Mod: PBBFAC,,, | Performed by: INTERNAL MEDICINE

## 2017-10-05 PROCEDURE — 36415 COLL VENOUS BLD VENIPUNCTURE: CPT

## 2017-10-05 PROCEDURE — 99213 OFFICE O/P EST LOW 20 MIN: CPT | Mod: PBBFAC | Performed by: INTERNAL MEDICINE

## 2017-10-05 PROCEDURE — 85025 COMPLETE CBC W/AUTO DIFF WBC: CPT

## 2017-10-05 RX ORDER — PAROXETINE HYDROCHLORIDE 20 MG/1
20 TABLET, FILM COATED ORAL DAILY
Qty: 30 TABLET | Refills: 2 | Status: ON HOLD | OUTPATIENT
Start: 2017-10-05 | End: 2018-03-07

## 2017-10-05 NOTE — Clinical Note
CBc and CMP at the [facility] in a week; see me in 2 weeks with same labs here.  I need a 30 minute appointment.  Please coordinate so that he can see Dr. Bishop when he returns here in two weeks

## 2017-10-06 ENCOUNTER — TELEPHONE (OUTPATIENT)
Dept: INFUSION THERAPY | Facility: HOSPITAL | Age: 67
End: 2017-10-06

## 2017-10-06 ENCOUNTER — TELEPHONE (OUTPATIENT)
Dept: HEMATOLOGY/ONCOLOGY | Facility: CLINIC | Age: 67
End: 2017-10-06

## 2017-10-06 DIAGNOSIS — C78.7 SECONDARY MALIGNANT NEOPLASM OF LIVER: Primary | ICD-10-CM

## 2017-10-09 ENCOUNTER — TELEPHONE (OUTPATIENT)
Dept: HEMATOLOGY/ONCOLOGY | Facility: CLINIC | Age: 67
End: 2017-10-09

## 2017-10-09 DIAGNOSIS — Z51.11 ENCOUNTER FOR ANTINEOPLASTIC CHEMOTHERAPY: Primary | ICD-10-CM

## 2017-10-10 ENCOUNTER — TELEPHONE (OUTPATIENT)
Dept: HEMATOLOGY/ONCOLOGY | Facility: CLINIC | Age: 67
End: 2017-10-10

## 2017-10-12 ENCOUNTER — TELEPHONE (OUTPATIENT)
Dept: HEMATOLOGY/ONCOLOGY | Facility: CLINIC | Age: 67
End: 2017-10-12

## 2017-10-12 ENCOUNTER — INFUSION (OUTPATIENT)
Dept: INFUSION THERAPY | Facility: HOSPITAL | Age: 67
End: 2017-10-12
Attending: INTERNAL MEDICINE
Payer: MEDICARE

## 2017-10-12 ENCOUNTER — OFFICE VISIT (OUTPATIENT)
Dept: HEMATOLOGY/ONCOLOGY | Facility: CLINIC | Age: 67
End: 2017-10-12
Payer: MEDICARE

## 2017-10-12 VITALS
HEART RATE: 96 BPM | TEMPERATURE: 100 F | SYSTOLIC BLOOD PRESSURE: 142 MMHG | DIASTOLIC BLOOD PRESSURE: 80 MMHG | RESPIRATION RATE: 17 BRPM

## 2017-10-12 DIAGNOSIS — D64.9 ANEMIA, UNSPECIFIED TYPE: ICD-10-CM

## 2017-10-12 DIAGNOSIS — C78.7 LIVER METASTASES: ICD-10-CM

## 2017-10-12 DIAGNOSIS — C64.9 RENAL CELL CARCINOMA, UNSPECIFIED LATERALITY: Primary | ICD-10-CM

## 2017-10-12 LAB
BLD PROD TYP BPU: NORMAL
BLOOD UNIT EXPIRATION DATE: NORMAL
BLOOD UNIT TYPE CODE: 5100
BLOOD UNIT TYPE: NORMAL
CODING SYSTEM: NORMAL
DISPENSE STATUS: NORMAL
TRANS ERYTHROCYTES VOL PATIENT: NORMAL ML

## 2017-10-12 PROCEDURE — 36430 TRANSFUSION BLD/BLD COMPNT: CPT

## 2017-10-12 PROCEDURE — 99999 PR PBB SHADOW E&M-EST. PATIENT-LVL I: CPT | Mod: PBBFAC,,, | Performed by: INTERNAL MEDICINE

## 2017-10-12 PROCEDURE — 25000003 PHARM REV CODE 250: Performed by: INTERNAL MEDICINE

## 2017-10-12 PROCEDURE — 99211 OFF/OP EST MAY X REQ PHY/QHP: CPT | Mod: PBBFAC,25 | Performed by: INTERNAL MEDICINE

## 2017-10-12 PROCEDURE — 63600175 PHARM REV CODE 636 W HCPCS: Performed by: INTERNAL MEDICINE

## 2017-10-12 PROCEDURE — P9021 RED BLOOD CELLS UNIT: HCPCS

## 2017-10-12 PROCEDURE — 99215 OFFICE O/P EST HI 40 MIN: CPT | Mod: 25,S$PBB,, | Performed by: INTERNAL MEDICINE

## 2017-10-12 PROCEDURE — 86920 COMPATIBILITY TEST SPIN: CPT

## 2017-10-12 PROCEDURE — 27201040 HC RC 50 FILTER

## 2017-10-12 PROCEDURE — 96374 THER/PROPH/DIAG INJ IV PUSH: CPT

## 2017-10-12 PROCEDURE — 96375 TX/PRO/DX INJ NEW DRUG ADDON: CPT

## 2017-10-12 RX ORDER — ACETAMINOPHEN 325 MG/1
650 TABLET ORAL
Status: DISCONTINUED | OUTPATIENT
Start: 2017-10-12 | End: 2017-10-12 | Stop reason: HOSPADM

## 2017-10-12 RX ORDER — DIPHENHYDRAMINE HCL 25 MG
25 CAPSULE ORAL
Status: CANCELLED | OUTPATIENT
Start: 2017-10-12

## 2017-10-12 RX ORDER — HYDROCODONE BITARTRATE AND ACETAMINOPHEN 500; 5 MG/1; MG/1
TABLET ORAL ONCE
Status: CANCELLED | OUTPATIENT
Start: 2017-10-12 | End: 2017-10-12

## 2017-10-12 RX ORDER — ACETAMINOPHEN 325 MG/1
650 TABLET ORAL
Status: CANCELLED | OUTPATIENT
Start: 2017-10-12

## 2017-10-12 RX ORDER — DIPHENHYDRAMINE HCL 25 MG
25 CAPSULE ORAL
Status: COMPLETED | OUTPATIENT
Start: 2017-10-12 | End: 2017-10-12

## 2017-10-12 RX ORDER — HYDROCODONE BITARTRATE AND ACETAMINOPHEN 500; 5 MG/1; MG/1
TABLET ORAL ONCE
Status: DISCONTINUED | OUTPATIENT
Start: 2017-10-12 | End: 2017-10-12 | Stop reason: HOSPADM

## 2017-10-12 RX ADMIN — DIPHENHYDRAMINE HYDROCHLORIDE 25 MG: 25 CAPSULE ORAL at 02:10

## 2017-10-12 RX ADMIN — HYDROCORTISONE SODIUM SUCCINATE 50 MG: 100 INJECTION, POWDER, FOR SOLUTION INTRAMUSCULAR; INTRAVENOUS at 02:10

## 2017-10-12 NOTE — PROGRESS NOTES
Subjective:       Patient ID: Bud Russo III is a 67 y.o. male.    Chief Complaint: No chief complaint on file.    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  His creatinine is 2.1 mg/dl, and his bilirubin is 2.1 mg/dl.    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  His diagnosis was established on April 30th when he presented to an Emergency Room in [place], [place], where he lived with left flank pain.  Radiologic studies showed a mass in his left kidney.  Subsequently, he went to [place], [place], where he was staged and was found to have a mass in his liver.  On 05/23/2017, he   underwent an embolization of his tumor in preparation for a possible liver resection; and on 06/06/2017, he underwent a left nephrectomy.  He remained in the [place] area in preparation of a liver resection; however, an MRI of his abdomen and pelvis that was done last week showed that there was significant progression of the metastatic liver disease with interval increase in size of a dominant right hepatic lobe mass from 6.1 cm previously to 11.2 cm with evidence of internal necrosis and peripheral enhancement.  In addition, multiple new lesions were identified in both hepatic lobes, measuring up to 1.9 cm in the left lateral lobe and 1.2 cm in the right hepatic lobe.  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  The patient met with an oncologist at [facility], and he was offered chemotherapy.  He had decided to return to [place] and receive chemotherapy at our facility.  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  He started chemotherapy 2 months ago, and has  completed three cycles.   Unfortunately the staging MRI scan shows that compared to his prior CT of July 19, 2017 there has been an interval increase in number and size of the foci of hepatic metastatic disease within the liver.     the combination of lenvatinib plus afinitor was recommended to him and he started it three days ago.  Review of Systems      Overall he feels fair. He  complainns of feeling extremely tired and 'worn out'.   He finally started the combination of lenvatinib plus afinitor three days ago.  He has been experiencing mild abdominal discomfort and high fevers almost every night, up to 102.  Prior to the initiation of therapy he would only experience low grade fever, typically less than 100.4.   His appetite is diminished, and he keeps loosing weight.  He denies any pain but he does feel bloated.   He denies any anxiety, depression, easy bruising, fevers, chills, night  sweats, weight loss, nausea, vomiting, diarrhea, constipation, diplopia, blurred vision, headache, chest pain, palpitations, shortness of breath, breast pain, abdominal pain, extremity pain, or difficulty ambulating.  The remainder of the ten-point ROS, including general, skin, lymph, H/N, cardiorespiratory, GI, , Neuro, Endocrine, and psychiatric is negative.     Objective:      Physical Exam    He is alert, oriented to time, place, person, pleasant, well      nourished, in no acute physical distress.  He is here with his wife.                                  VITAL SIGNS:  Reviewed                                      HEENT:  Normal.  There are no nasal, oral, lip, gingival, auricular, lid,    or conjunctival lesions.  Mucosae are moist and pink, and there is no        thrush.  Pupils are equal, reactive to light and accommodation.              Extraocular muscle movements are intact.     Dentition is good.                                  NECK:  Supple without JVD, adenopathy, or thyromegaly.                       LUNGS:   Clear to auscultation without wheezing, rales, or rhonchi.           CARDIOVASCULAR:  Reveals an S1, S2, no murmurs, no rubs, no gallops.         ABDOMEN:  Soft, nontender, without organomegaly.  Bowel sounds are  present.  A scar from his left nephrectomy is identified.     Scars from bilateral varicocele surgeries are identified.                                                                 EXTREMITIES:  No cyanosis, clubbing, or edema.                                                               LYMPHATIC:  There is no cervical, axillary, or supraclavicular adenopathy.   SKIN:  Warm and moist, without petechiae, rashes, induration, or ecchymoses.           NEUROLOGIC:  DTRs are 0-1+ bilaterally, symmetrical, motor function is 5/5,  and cranial nerves are  within normal limits.  Both fundi are sharp.    Assessment:       1. Renal cell carcinoma of kidney excluding renal pelvis    2. CRF    3. Liver metastases    4. Encounter for antineoplastic chemotherapy     5.     Depression  6.      Fever, most likely tumor related, possibly with superimposed drug fever.   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  Given his symptomatic anemia, he will receive one unit of PRBCs today.  I will see him again in a week with repeat labs, and he will see Dr. Bishop on the same day.  I have asked him to take tylenol as needed for his fever, which I suspect is a combination of tumor fever and possibly drug fever. With him no longer being on myelosuppressive chemotherapy, there is no reason to present to the ED every time his temperature is above 100.4.  His multiple questions and his wife's multiple questions were answered to his satisfaction.

## 2017-10-12 NOTE — PLAN OF CARE
Problem: Patient Care Overview  Goal: Plan of Care Review  Outcome: Ongoing (interventions implemented as appropriate)  Pt tolerated 1 unit prc's without issue, avs given, rtc 10/19/17, no distress noted upon d/c to home

## 2017-10-12 NOTE — PLAN OF CARE
Problem: Anemia (Adult)  Goal: Identify Related Risk Factors and Signs and Symptoms  Related risk factors and signs and symptoms are identified upon initiation of Human Response Clinical Practice Guideline (CPG)  Outcome: Ongoing (interventions implemented as appropriate)  Pt here to receive 1 unit prc's, labs ,hx, meds, allergies reviewed, reclined in chair, spouse with pt, continue to monitor, pt states has been getting fever around this time of the day, just took Tylenol 650 MG po around 30 minutes ago

## 2017-10-18 PROBLEM — C64.9 RENAL CELL CARCINOMA: Status: ACTIVE | Noted: 2017-10-18

## 2017-10-18 PROBLEM — C64.9 RENAL CELL CARCINOMA: Status: RESOLVED | Noted: 2017-07-13 | Resolved: 2017-10-18

## 2017-10-18 NOTE — PROGRESS NOTES
Subjective:       Patient ID: Bud Russo III is a 67 y.o. male.    Chief Complaint: No chief complaint on file.    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  He ahs been on the combination of lenvatinib plus everolimus for ten days now, and he ahs tolearted it extremely well.    His CBC shows a WBC count of 10,100 /mm3, Hg 9.5 gr/dl, Ht 31.1 % and platelets 329,000 /mm3.  His creatinine is 1.8 mg/dl, BUN 31, albumins 2.0 gr/dl, and and his bilirubin is 0.4 mg/dl.    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  His diagnosis was established on April 30th when he presented to an Emergency Room in [place], [place], where he lived with left flank pain.  Radiologic studies showed a mass in his left kidney.  Subsequently, he went to [place], [place], where he was staged and was found to have a mass in his liver.  On 05/23/2017, he   underwent an embolization of his tumor in preparation for a possible liver resection; and on 06/06/2017, he underwent a left nephrectomy.  He remained in the [place] area in preparation of a liver resection; however, an MRI of his abdomen and pelvis that was done last week showed that there was significant progression of the metastatic liver disease with interval increase in size of a dominant right hepatic lobe mass from 6.1 cm previously to 11.2 cm with evidence of internal necrosis and peripheral enhancement.  In addition, multiple new lesions were identified in both hepatic lobes, measuring up to 1.9 cm in the left lateral lobe and 1.2 cm in the right hepatic lobe.  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  The patient met with an oncologist at [facility], and he was offered chemotherapy.  He had decided to return to [place] and receive chemotherapy at our facility.  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  He started chemotherapy 2 months ago, and has completed three cycles.   Unfortunately the staging MRI scan shows that compared to his prior CT of July 19, 2017 there has been an interval increase in number and size of the foci of hepatic metastatic disease within the liver, hence the initiation of second line treatment with the combination of lenvatinib plus afinitor.  Review of Systems      Overall he feels 'so much better".  He has not had a fever for a week.  His appetite has incerased and he states that he is getting his taste back.  He denies any pain.  He denies any anxiety, depression, easy bruising, fevers, chills, night  sweats, weight loss, nausea, vomiting, diarrhea, constipation, diplopia, blurred vision, headache, chest pain, palpitations, shortness of breath, breast pain, abdominal pain, extremity pain, or difficulty ambulating.  The remainder of the ten-point ROS, including general, skin, lymph, H/N, cardiorespiratory, GI, , Neuro, Endocrine, and psychiatric is negative. He had an appointment with Dr. Bishop for later today but decided to cancel it given his significant symptomatic improvement.    Objective:      Physical Exam    He is alert, oriented to time, place, person, pleasant, well      nourished, in no acute physical distress.  He is here with his wife.                                  VITAL SIGNS:  Reviewed                                      HEENT:  Normal.  There are no nasal, oral, lip, gingival, auricular, lid,    or conjunctival lesions.  Mucosae are moist and pink, and there is no        thrush.  Pupils are equal, reactive to light and accommodation.              Extraocular muscle movements are intact.     Dentition is good.                                  NECK:  Supple without JVD, adenopathy, or thyromegaly.                       LUNGS:  Clear to auscultation without wheezing, " rales, or rhonchi.           CARDIOVASCULAR:  Reveals an S1, S2, no murmurs, no rubs, no gallops.         ABDOMEN:  Soft, nontender, without organomegaly.  Bowel sounds are  present.  A scar from his left nephrectomy is identified.     Scars from bilateral varicocele surgeries are identified.                                                                 EXTREMITIES:  No cyanosis, clubbing, or edema.                                                               LYMPHATIC:  There is no cervical, axillary, or supraclavicular adenopathy.   SKIN:  Warm and moist, without petechiae, rashes, induration, or ecchymoses.           NEUROLOGIC:  DTRs are 0-1+ bilaterally, symmetrical, motor function is 5/5,  and cranial nerves are  within normal limits.  Both fundi are sharp.    Assessment:       1. Renal cell carcinoma of kidney excluding renal pelvis    2. CRF, imrpoved    3. Liver metastases    4. 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  He will remain on the combination of lenvatinib plus everolimus, and see me with repeat labs in 2 weeks.  If he continues to feel well, we will repeat his scans in early January..  His multiple questions and his wife's multiple questions were answered to his satisfaction.

## 2017-10-19 ENCOUNTER — OFFICE VISIT (OUTPATIENT)
Dept: HEMATOLOGY/ONCOLOGY | Facility: CLINIC | Age: 67
End: 2017-10-19
Payer: MEDICARE

## 2017-10-19 VITALS
TEMPERATURE: 98 F | HEIGHT: 66 IN | RESPIRATION RATE: 18 BRPM | HEART RATE: 92 BPM | SYSTOLIC BLOOD PRESSURE: 151 MMHG | DIASTOLIC BLOOD PRESSURE: 88 MMHG | WEIGHT: 185 LBS | BODY MASS INDEX: 29.73 KG/M2 | OXYGEN SATURATION: 99 %

## 2017-10-19 DIAGNOSIS — C78.7 LIVER METASTASES: ICD-10-CM

## 2017-10-19 DIAGNOSIS — Z51.11 ENCOUNTER FOR ANTINEOPLASTIC CHEMOTHERAPY: ICD-10-CM

## 2017-10-19 DIAGNOSIS — C64.1 RENAL CELL CARCINOMA OF RIGHT KIDNEY: Primary | ICD-10-CM

## 2017-10-19 DIAGNOSIS — C64.9 RENAL CELL CARCINOMA, UNSPECIFIED LATERALITY: ICD-10-CM

## 2017-10-19 PROCEDURE — 99215 OFFICE O/P EST HI 40 MIN: CPT | Mod: 25,S$PBB,, | Performed by: INTERNAL MEDICINE

## 2017-10-19 PROCEDURE — 99999 PR PBB SHADOW E&M-EST. PATIENT-LVL I: CPT | Mod: PBBFAC,,, | Performed by: INTERNAL MEDICINE

## 2017-10-19 PROCEDURE — 99211 OFF/OP EST MAY X REQ PHY/QHP: CPT | Mod: PBBFAC | Performed by: INTERNAL MEDICINE

## 2017-10-23 ENCOUNTER — TELEPHONE (OUTPATIENT)
Dept: PHARMACY | Facility: CLINIC | Age: 67
End: 2017-10-23

## 2017-10-23 NOTE — TELEPHONE ENCOUNTER
"Patient reports tolerating medication regiment extremely well since beginning Afinitor and Lenvima on 10/9/17.  He denies fever, tylenol doses and has stopped the verapamil.  Notes checking blood pressure every morning and maintaining readings of less than 140/90.  He notes taking his doses around 8am every morning before breakfast.  He denies any missed doses and has minimal side effects.  The most frequently reported side effect is fatigue, but states its "nothing a little nap can't fix."  Overall he is very happy and pleased with regimen because he feels well enough to get out and about and socialize again.      "

## 2017-10-27 ENCOUNTER — TELEPHONE (OUTPATIENT)
Dept: PHARMACY | Facility: CLINIC | Age: 67
End: 2017-10-27

## 2017-11-02 ENCOUNTER — OFFICE VISIT (OUTPATIENT)
Dept: HEMATOLOGY/ONCOLOGY | Facility: CLINIC | Age: 67
End: 2017-11-02
Payer: MEDICARE

## 2017-11-02 ENCOUNTER — LAB VISIT (OUTPATIENT)
Dept: LAB | Facility: HOSPITAL | Age: 67
End: 2017-11-02
Attending: INTERNAL MEDICINE
Payer: MEDICARE

## 2017-11-02 VITALS
RESPIRATION RATE: 18 BRPM | OXYGEN SATURATION: 98 % | DIASTOLIC BLOOD PRESSURE: 98 MMHG | TEMPERATURE: 98 F | HEART RATE: 86 BPM | WEIGHT: 189 LBS | SYSTOLIC BLOOD PRESSURE: 178 MMHG | BODY MASS INDEX: 30.51 KG/M2

## 2017-11-02 DIAGNOSIS — C78.7 LIVER METASTASES: ICD-10-CM

## 2017-11-02 DIAGNOSIS — Z51.11 ENCOUNTER FOR ANTINEOPLASTIC CHEMOTHERAPY: ICD-10-CM

## 2017-11-02 DIAGNOSIS — C64.9 RENAL CELL CARCINOMA, UNSPECIFIED LATERALITY: Primary | ICD-10-CM

## 2017-11-02 DIAGNOSIS — D64.9 ANEMIA, UNSPECIFIED TYPE: ICD-10-CM

## 2017-11-02 DIAGNOSIS — C64.9 RENAL CELL CARCINOMA, UNSPECIFIED LATERALITY: ICD-10-CM

## 2017-11-02 LAB
ALBUMIN SERPL BCP-MCNC: 2.1 G/DL
ALP SERPL-CCNC: 221 U/L
ALT SERPL W/O P-5'-P-CCNC: 32 U/L
ANION GAP SERPL CALC-SCNC: 9 MMOL/L
AST SERPL-CCNC: 45 U/L
BILIRUB SERPL-MCNC: 0.4 MG/DL
BUN SERPL-MCNC: 36 MG/DL
CALCIUM SERPL-MCNC: 9.3 MG/DL
CHLORIDE SERPL-SCNC: 108 MMOL/L
CO2 SERPL-SCNC: 24 MMOL/L
CREAT SERPL-MCNC: 1.8 MG/DL
ERYTHROCYTE [DISTWIDTH] IN BLOOD BY AUTOMATED COUNT: 19.1 %
EST. GFR  (AFRICAN AMERICAN): 44 ML/MIN/1.73 M^2
EST. GFR  (NON AFRICAN AMERICAN): 38.1 ML/MIN/1.73 M^2
GLUCOSE SERPL-MCNC: 93 MG/DL
HCT VFR BLD AUTO: 31.6 %
HGB BLD-MCNC: 9.5 G/DL
IMM GRANULOCYTES # BLD AUTO: 0.33 K/UL
MCH RBC QN AUTO: 27.4 PG
MCHC RBC AUTO-ENTMCNC: 30.1 G/DL
MCV RBC AUTO: 91 FL
NEUTROPHILS # BLD AUTO: 7.7 K/UL
PLATELET # BLD AUTO: 213 K/UL
PMV BLD AUTO: 9.4 FL
POTASSIUM SERPL-SCNC: 4.8 MMOL/L
PROT SERPL-MCNC: 7.4 G/DL
RBC # BLD AUTO: 3.47 M/UL
SODIUM SERPL-SCNC: 141 MMOL/L
WBC # BLD AUTO: 10.72 K/UL

## 2017-11-02 PROCEDURE — 80053 COMPREHEN METABOLIC PANEL: CPT

## 2017-11-02 PROCEDURE — 36415 COLL VENOUS BLD VENIPUNCTURE: CPT

## 2017-11-02 PROCEDURE — 85027 COMPLETE CBC AUTOMATED: CPT

## 2017-11-02 PROCEDURE — 99215 OFFICE O/P EST HI 40 MIN: CPT | Mod: 25,S$PBB,, | Performed by: INTERNAL MEDICINE

## 2017-11-02 PROCEDURE — 99213 OFFICE O/P EST LOW 20 MIN: CPT | Mod: PBBFAC | Performed by: INTERNAL MEDICINE

## 2017-11-02 PROCEDURE — 99999 PR PBB SHADOW E&M-EST. PATIENT-LVL III: CPT | Mod: PBBFAC,,, | Performed by: INTERNAL MEDICINE

## 2017-11-02 NOTE — PROGRESS NOTES
Subjective:       Patient ID: Bud Russo III is a 67 y.o. male.    Chief Complaint: No chief complaint on file.    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  He has been on the combination of lenvatinib plus everolimus for three weeks now, and he has tolerated it extremely well.    His CBC shows a WBC count of 10,700 /mm3, Hg 9.5 gr/dl, Ht 31.6 % and platelets 213,000 /mm3.  His creatinine is 1.8 mg/dl, BUN 36, albumins 2.1 gr/dl, and and his bilirubin is 0.4 mg/dl.    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  His diagnosis was established on April 30th when he presented to an Emergency Room in [place], [place], where he lived with left flank pain.  Radiologic studies showed a mass in his left kidney.  Subsequently, he went to [place], [place], where he was staged and was found to have a mass in his liver.  On 05/23/2017, he   underwent an embolization of his tumor in preparation for a possible liver resection; and on 06/06/2017, he underwent a left nephrectomy.  He remained in the [place] area in preparation of a liver resection; however, an MRI of his abdomen and pelvis that was done last week showed that there was significant progression of the metastatic liver disease with interval increase in size of a dominant right hepatic lobe mass from 6.1 cm previously to 11.2 cm with evidence of internal necrosis and peripheral enhancement.  In addition, multiple new lesions were identified in both hepatic lobes, measuring up to 1.9 cm in the left lateral lobe and 1.2 cm in the right hepatic lobe.  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  The patient met with an oncologist at [place], and he was offered chemotherapy.  He had decided to return to [place] and receive chemotherapy at our facility.  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  He started chemotherapy 2 months ago, and has completed three cycles.   Unfortunately the staging MRI scan shows that compared to his prior CT of July 19, 2017 there has been an interval increase in number and size of the foci of hepatic metastatic disease within the liver, hence the initiation of second line treatment with the combination of lenvatinib plus afinitor, which he has tolerated well so far.      Review of Systems      Overall he feels 'so much better".  He has not had a fever for the last three weeks.  His appetite has incerased and he states that he is getting his taste back.  He denies any pain.  He denies any anxiety, depression, easy bruising, fevers, chills, night  sweats, weight loss, nausea, vomiting, diarrhea, constipation, diplopia, blurred vision, headache, chest pain, palpitations, shortness of breath, breast pain, abdominal pain, extremity pain, or difficulty ambulating.  The remainder of the ten-point ROS, including general, skin, lymph, H/N, cardiorespiratory, GI, , Neuro, Endocrine, and psychiatric is negative. He had an appointment with Dr. Bishop for later today but decided to cancel it given his significant symptomatic improvement.    Objective:      Physical Exam    He is alert, oriented to time, place, person, pleasant, well      nourished, in no acute physical distress.  He is here with his wife.                                  VITAL SIGNS:  Reviewed                                      HEENT:  Normal.  There are no nasal, oral, lip, gingival, auricular, lid,    or conjunctival lesions.  Mucosae are moist and pink, and there is no        thrush.  Pupils are equal, reactive to light and accommodation.              Extraocular muscle movements are intact.     Dentition is good.                                  NECK:  Supple without JVD, adenopathy, or thyromegaly.     "                   LUNGS:  Clear to auscultation without wheezing, rales, or rhonchi.           CARDIOVASCULAR:  Reveals an S1, S2, no murmurs, no rubs, no gallops.         ABDOMEN:  Soft, nontender, without organomegaly.  Bowel sounds are  present.  A scar from his left nephrectomy is identified.     Scars from bilateral varicocele surgeries are identified.                                                                 EXTREMITIES:  No cyanosis, clubbing, or edema.                                                               LYMPHATIC:  There is no cervical, axillary, or supraclavicular adenopathy.   SKIN:  Warm and moist, without petechiae, rashes, induration, or ecchymoses.           NEUROLOGIC:  DTRs are 0-1+ bilaterally, symmetrical, motor function is 5/5,  and cranial nerves are  within normal limits.  Both fundi are sharp.    Assessment:       1. Renal cell carcinoma of kidney excluding renal pelvis    2. CRF, imrpoved    3. Liver metastases    4. 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  He will remain on the combination of lenvatinib plus everolimus, and see me with repeat labs in 3 weeks.  If he continues to feel well, we will repeat his scans in early January.  His multiple questions and his wife's multiple questions were answered to his satisfaction.

## 2017-11-08 ENCOUNTER — TELEPHONE (OUTPATIENT)
Dept: HEMATOLOGY/ONCOLOGY | Facility: CLINIC | Age: 67
End: 2017-11-08

## 2017-11-08 NOTE — TELEPHONE ENCOUNTER
----- Message from Jackeline Prakash RN sent at 11/8/2017  8:52 AM CST -----  Contact: Wife      ----- Message -----  From: Janel Tam  Sent: 11/7/2017   4:13 PM  To: Dayami De Anda Staff    Wife is calling because the pt was to be scheduled on 11/2817, but was scheduled on 11/21/17.  She is requesting a correction is made.    She can be reached at [phone].    Thank you.

## 2017-11-08 NOTE — TELEPHONE ENCOUNTER
Returned call, spoke with patients wife and assisted with changing her husbands apt. New apt mailed out

## 2017-11-09 DIAGNOSIS — C78.7 LIVER METASTASES: ICD-10-CM

## 2017-11-09 DIAGNOSIS — Z51.11 ENCOUNTER FOR ANTINEOPLASTIC CHEMOTHERAPY: ICD-10-CM

## 2017-11-09 RX ORDER — DOCUSATE SODIUM 100 MG/1
CAPSULE, LIQUID FILLED ORAL
Qty: 90 CAPSULE | Refills: 0 | Status: ON HOLD | OUTPATIENT
Start: 2017-11-09 | End: 2018-03-07

## 2017-11-14 ENCOUNTER — TELEPHONE (OUTPATIENT)
Dept: PHARMACY | Facility: CLINIC | Age: 67
End: 2017-11-14

## 2017-11-20 DIAGNOSIS — K12.1 STOMATITIS: Primary | ICD-10-CM

## 2017-11-20 RX ORDER — DEXAMETHASONE 0.5 MG/5ML
ELIXIR ORAL
Qty: 237 ML | Refills: 0 | Status: SHIPPED | OUTPATIENT
Start: 2017-11-20 | End: 2017-11-20 | Stop reason: SDUPTHER

## 2017-11-20 RX ORDER — TETANUS TOXOID, REDUCED DIPHTHERIA TOXOID AND ACELLULAR PERTUSSIS VACCINE, ADSORBED 5; 2.5; 8; 8; 2.5 [IU]/.5ML; [IU]/.5ML; UG/.5ML; UG/.5ML; UG/.5ML
SUSPENSION INTRAMUSCULAR
Refills: 0 | Status: ON HOLD | COMMUNITY
Start: 2017-11-07 | End: 2018-03-07

## 2017-11-20 RX ORDER — DEXAMETHASONE 0.5 MG/5ML
ELIXIR ORAL
Qty: 237 ML | Refills: 0 | Status: SHIPPED | OUTPATIENT
Start: 2017-11-20 | End: 2018-01-15

## 2017-11-24 ENCOUNTER — TELEPHONE (OUTPATIENT)
Dept: PHARMACY | Facility: CLINIC | Age: 67
End: 2017-11-24

## 2017-11-24 PROBLEM — D63.0 ANEMIA IN NEOPLASTIC DISEASE: Status: ACTIVE | Noted: 2017-11-24

## 2017-11-24 NOTE — PROGRESS NOTES
Subjective:       Patient ID: Bud Russo III is a 67 y.o. male.    Chief Complaint: No chief complaint on file.    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  He has been on the combination of lenvatinib plus everolimus for six weeks now, and he has tolerated it extremely well.  The dose of lenvatinib is 10 mg QD.    His CBC shows a WBC count of 8,400 /mm3, Hg 9.6 gr/dl, Ht 32.7 % and platelets 241,000 /mm3.  His creatinine is 2.1 mg/dl, BUN 36, albumins 2.1 gr/dl, and and his bilirubin is 0.3 mg/dl.    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  His diagnosis was established on April 30th when he presented to an Emergency Room in [place], [place], where he lived with left flank pain.  Radiologic studies showed a mass in his left kidney.  Subsequently, he went to [place], [place], where he was staged and was found to have a mass in his liver.  On 05/23/2017, he   underwent an embolization of his tumor in preparation for a possible liver resection; and on 06/06/2017, he underwent a left nephrectomy.  He remained in the [place] area in preparation of a liver resection; however, an MRI of his abdomen and pelvis that was done last week showed that there was significant progression of the metastatic liver disease with interval increase in size of a dominant right hepatic lobe mass from 6.1 cm previously to 11.2 cm with evidence of internal necrosis and peripheral enhancement.  In addition, multiple new lesions were identified in both hepatic lobes, measuring up to 1.9 cm in the left lateral lobe and 1.2 cm in the right hepatic lobe.  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  The patient met with an oncologist at [place], and he was offered chemotherapy.  He had decided to return to [place] and receive chemotherapy at our facility.  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  He started chemotherapy 3 months ago, and has completed three cycles.   Unfortunately the staging MRI scan shows that compared to his prior CT of July 19, 2017 there has been an interval increase in number and size of the foci of hepatic metastatic disease within the liver, hence the initiation of second line treatment with the combination of lenvatinib plus afinitor, which he has tolerated well so far.      Review of Systems      Overall he feels  "much better".  He has not had a fever for the last month.  His appetite has increased.  He denies any pain.  He denies any anxiety, depression, easy bruising, fevers, chills, night  sweats, weight loss, nausea, vomiting, diarrhea, constipation, diplopia, blurred vision, headache, chest pain, palpitations, shortness of breath, breast pain, abdominal pain, extremity pain, or difficulty ambulating.  The remainder of the ten-point ROS, including general, skin, lymph, H/N, cardiorespiratory, GI, , Neuro, Endocrine, and psychiatric is negative. He had an appointment with Dr. Bishop for later today but decided to cancel it given his significant symptomatic improvement.    Objective:      Physical Exam    He is alert, oriented to time, place, person, pleasant, well      nourished, in no acute physical distress.  He is here with his wife.                                  VITAL SIGNS:  Reviewed                                      HEENT:  Normal.  There are no nasal, oral, lip, gingival, auricular, lid,    or conjunctival lesions.  Mucosae are moist and pink, and there is no        thrush.  Pupils are equal, reactive to light and accommodation.              Extraocular muscle movements are intact.     Dentition is good.                                  NECK:  Supple without JVD, adenopathy, or thyromegaly.                       LUNGS:  " Clear to auscultation without wheezing, rales, or rhonchi.           CARDIOVASCULAR:  Reveals an S1, S2, no murmurs, no rubs, no gallops.         ABDOMEN:  Soft, nontender, without organomegaly.  Bowel sounds are  present.  A scar from his left nephrectomy is identified.     Scars from bilateral varicocele surgeries are identified.                                                                 EXTREMITIES:  No cyanosis, clubbing, or edema.                                                               LYMPHATIC:  There is no cervical, axillary, or supraclavicular adenopathy.   SKIN:  Warm and moist, without petechiae, rashes, induration, or ecchymoses.           NEUROLOGIC:  DTRs are 0-1+ bilaterally, symmetrical, motor function is 5/5,  and cranial nerves are  within normal limits.  Both fundi are sharp.    Assessment:       1. Renal cell carcinoma of kidney excluding renal pelvis    2. CRF, imrpoved    3. Liver metastases    4. 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  He will remain on the combination of lenvatinib plus everolimus, and see me with repeat labs in 3 weeks.  If he continues to feel well, we will repeat his scans in early January.  His multiple questions and his wife's multiple questions were answered to his satisfaction.

## 2017-11-27 ENCOUNTER — OFFICE VISIT (OUTPATIENT)
Dept: HEMATOLOGY/ONCOLOGY | Facility: CLINIC | Age: 67
End: 2017-11-27
Payer: MEDICARE

## 2017-11-27 VITALS
TEMPERATURE: 98 F | HEART RATE: 62 BPM | WEIGHT: 196 LBS | OXYGEN SATURATION: 99 % | SYSTOLIC BLOOD PRESSURE: 162 MMHG | DIASTOLIC BLOOD PRESSURE: 82 MMHG | RESPIRATION RATE: 18 BRPM | HEIGHT: 65 IN | BODY MASS INDEX: 32.65 KG/M2

## 2017-11-27 DIAGNOSIS — D63.0 ANEMIA IN NEOPLASTIC DISEASE: ICD-10-CM

## 2017-11-27 DIAGNOSIS — Z51.11 ENCOUNTER FOR ANTINEOPLASTIC CHEMOTHERAPY: ICD-10-CM

## 2017-11-27 DIAGNOSIS — C78.7 LIVER METASTASES: ICD-10-CM

## 2017-11-27 DIAGNOSIS — C64.9 RENAL CELL CARCINOMA, UNSPECIFIED LATERALITY: Primary | ICD-10-CM

## 2017-11-27 PROCEDURE — 99999 PR PBB SHADOW E&M-EST. PATIENT-LVL I: CPT | Mod: PBBFAC,,, | Performed by: INTERNAL MEDICINE

## 2017-11-27 PROCEDURE — 99211 OFF/OP EST MAY X REQ PHY/QHP: CPT | Mod: PBBFAC | Performed by: INTERNAL MEDICINE

## 2017-11-27 PROCEDURE — 99214 OFFICE O/P EST MOD 30 MIN: CPT | Mod: S$PBB,,, | Performed by: INTERNAL MEDICINE

## 2017-12-04 DIAGNOSIS — C78.7 LIVER METASTASES: ICD-10-CM

## 2017-12-04 DIAGNOSIS — C64.9 RENAL CELL CARCINOMA OF KIDNEY EXCLUDING RENAL PELVIS: ICD-10-CM

## 2017-12-04 RX ORDER — FAMOTIDINE 40 MG/1
40 TABLET, FILM COATED ORAL 2 TIMES DAILY
Qty: 60 TABLET | Refills: 1 | Status: SHIPPED | OUTPATIENT
Start: 2017-12-04 | End: 2019-02-05 | Stop reason: SDUPTHER

## 2017-12-11 DIAGNOSIS — K12.1 STOMATITIS: ICD-10-CM

## 2017-12-11 RX ORDER — DEXAMETHASONE 0.5 MG/5ML
ELIXIR ORAL
Qty: 237 ML | Refills: 0 | Status: SHIPPED | OUTPATIENT
Start: 2017-12-11 | End: 2018-01-16 | Stop reason: SDUPTHER

## 2017-12-20 ENCOUNTER — TELEPHONE (OUTPATIENT)
Dept: HEMATOLOGY/ONCOLOGY | Facility: CLINIC | Age: 67
End: 2017-12-20

## 2017-12-20 DIAGNOSIS — C64.9 RENAL CELL CARCINOMA, UNSPECIFIED LATERALITY: Primary | ICD-10-CM

## 2017-12-20 NOTE — PROGRESS NOTES
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  He requested to see me in January.  We will obtain repeat scans and labs prior to his next visit.  We will arrange for him to get scans in early January at the [facility].

## 2017-12-26 ENCOUNTER — TELEPHONE (OUTPATIENT)
Dept: PHARMACY | Facility: CLINIC | Age: 67
End: 2017-12-26

## 2018-01-12 ENCOUNTER — TELEPHONE (OUTPATIENT)
Dept: HEMATOLOGY/ONCOLOGY | Facility: CLINIC | Age: 68
End: 2018-01-12

## 2018-01-12 ENCOUNTER — HOSPITAL ENCOUNTER (OUTPATIENT)
Dept: RADIOLOGY | Facility: HOSPITAL | Age: 68
Discharge: HOME | End: 2018-01-12
Attending: INTERNAL MEDICINE
Payer: MEDICARE

## 2018-01-12 DIAGNOSIS — C64.9 RENAL CELL CARCINOMA, UNSPECIFIED LATERALITY: ICD-10-CM

## 2018-01-12 DIAGNOSIS — Z51.11 ENCOUNTER FOR ANTINEOPLASTIC CHEMOTHERAPY: Primary | ICD-10-CM

## 2018-01-12 DIAGNOSIS — D50.9 HYPOCHROMIC ANEMIA: ICD-10-CM

## 2018-01-12 PROCEDURE — 74181 MRI ABDOMEN W/O CONTRAST: CPT | Mod: 26,,, | Performed by: RADIOLOGY

## 2018-01-12 PROCEDURE — 74181 MRI ABDOMEN W/O CONTRAST: CPT | Mod: TC,PO

## 2018-01-15 ENCOUNTER — TELEPHONE (OUTPATIENT)
Dept: HEMATOLOGY/ONCOLOGY | Facility: CLINIC | Age: 68
End: 2018-01-15

## 2018-01-15 ENCOUNTER — TELEPHONE (OUTPATIENT)
Dept: PHARMACY | Facility: CLINIC | Age: 68
End: 2018-01-15

## 2018-01-15 ENCOUNTER — OFFICE VISIT (OUTPATIENT)
Dept: HEMATOLOGY/ONCOLOGY | Facility: CLINIC | Age: 68
End: 2018-01-15
Payer: MEDICARE

## 2018-01-15 VITALS
HEART RATE: 94 BPM | HEIGHT: 66 IN | WEIGHT: 198 LBS | BODY MASS INDEX: 31.82 KG/M2 | TEMPERATURE: 98 F | SYSTOLIC BLOOD PRESSURE: 142 MMHG | RESPIRATION RATE: 18 BRPM | OXYGEN SATURATION: 99 % | DIASTOLIC BLOOD PRESSURE: 82 MMHG

## 2018-01-15 DIAGNOSIS — D63.0 ANEMIA IN NEOPLASTIC DISEASE: ICD-10-CM

## 2018-01-15 DIAGNOSIS — Z51.11 ENCOUNTER FOR ANTINEOPLASTIC CHEMOTHERAPY: Primary | ICD-10-CM

## 2018-01-15 DIAGNOSIS — R04.0 EPISTAXIS: ICD-10-CM

## 2018-01-15 DIAGNOSIS — C78.7 LIVER METASTASES: ICD-10-CM

## 2018-01-15 DIAGNOSIS — C64.1 RENAL CELL CARCINOMA OF RIGHT KIDNEY: ICD-10-CM

## 2018-01-15 DIAGNOSIS — K12.1 STOMATITIS: ICD-10-CM

## 2018-01-15 PROCEDURE — 99999 PR PBB SHADOW E&M-EST. PATIENT-LVL II: CPT | Mod: PBBFAC,,, | Performed by: INTERNAL MEDICINE

## 2018-01-15 PROCEDURE — 99215 OFFICE O/P EST HI 40 MIN: CPT | Mod: S$PBB,,, | Performed by: INTERNAL MEDICINE

## 2018-01-15 PROCEDURE — 99212 OFFICE O/P EST SF 10 MIN: CPT | Mod: PBBFAC | Performed by: INTERNAL MEDICINE

## 2018-01-15 RX ORDER — DEXAMETHASONE 0.5 MG/5ML
ELIXIR ORAL
Qty: 237 ML | Refills: 3 | Status: SHIPPED | OUTPATIENT
Start: 2018-01-15 | End: 2018-08-07

## 2018-01-15 NOTE — TELEPHONE ENCOUNTER
[facility] received prescription for Lenvima 14mg daily (dose increase).  Benefits investigation is required.

## 2018-01-15 NOTE — TELEPHONE ENCOUNTER
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

## 2018-01-15 NOTE — PROGRESS NOTES
Patient, Bud Russo III (MRN #[id number]), presented with a recent Platelet count less than 150 K/uL consistent with the definition of thrombocytopenia (ICD10 - D69.6).    Platelets   Date Value Ref Range Status   01/12/2018 122 (L) 150 - 350 K/uL Final     The patient's thrombocytopenia was monitored, evaluated, addressed and/or treated. This addendum to the medical record is made on 01/15/2018.

## 2018-01-16 ENCOUNTER — TELEPHONE (OUTPATIENT)
Dept: PHARMACY | Facility: CLINIC | Age: 68
End: 2018-01-16

## 2018-01-16 DIAGNOSIS — C64.9 RENAL CELL CARCINOMA, UNSPECIFIED LATERALITY: ICD-10-CM

## 2018-01-16 DIAGNOSIS — C64.9 RENAL CELL CARCINOMA OF KIDNEY EXCLUDING RENAL PELVIS: ICD-10-CM

## 2018-01-16 RX ORDER — EVEROLIMUS 5 MG/1
TABLET ORAL
Qty: 28 EACH | Refills: 2 | Status: ON HOLD | OUTPATIENT
Start: 2018-01-16 | End: 2018-03-07

## 2018-01-16 NOTE — TELEPHONE ENCOUNTER
Patient reports that he has been tolerating Afintior+Lenvima well with no missed doses.  He reports that he feels really well with increased mental clarity, but has had some issues with increased fatigue/weakness and bilateral nasal bleeding.  He follows his labs and acknowledges a downward trend in platelets.  He has an upcoming ENT appointment to assess the nasal bleeding.  Patient ready to start increased Lenvima dose (14mg) and hopeful for no increase in side effects with an increased response.

## 2018-01-17 DIAGNOSIS — Z51.11 ENCOUNTER FOR ANTINEOPLASTIC CHEMOTHERAPY: Primary | ICD-10-CM

## 2018-01-17 NOTE — TELEPHONE ENCOUNTER
----- Message from Adilson Stock MD sent at 1/17/2018  2:06 PM CST -----  He needs a urinalysis.  He can get it early next week at the [facility].  Orders are in.  Can you please link it and let him know?

## 2018-01-17 NOTE — TELEPHONE ENCOUNTER
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 He requested it be scheduled when he has labs on 2/5. Renan made

## 2018-01-22 ENCOUNTER — OFFICE VISIT (OUTPATIENT)
Dept: OTOLARYNGOLOGY | Facility: CLINIC | Age: 68
End: 2018-01-22
Payer: MEDICARE

## 2018-01-22 VITALS
SYSTOLIC BLOOD PRESSURE: 151 MMHG | WEIGHT: 199.31 LBS | BODY MASS INDEX: 32.17 KG/M2 | HEART RATE: 93 BPM | DIASTOLIC BLOOD PRESSURE: 86 MMHG

## 2018-01-22 DIAGNOSIS — R49.0 HOARSENESS: ICD-10-CM

## 2018-01-22 DIAGNOSIS — R04.0 EPISTAXIS: Primary | ICD-10-CM

## 2018-01-22 PROCEDURE — 99203 OFFICE O/P NEW LOW 30 MIN: CPT | Mod: 25,S$PBB,, | Performed by: OTOLARYNGOLOGY

## 2018-01-22 PROCEDURE — 31575 DIAGNOSTIC LARYNGOSCOPY: CPT | Mod: PBBFAC | Performed by: OTOLARYNGOLOGY

## 2018-01-22 PROCEDURE — 31575 DIAGNOSTIC LARYNGOSCOPY: CPT | Mod: S$PBB,,, | Performed by: OTOLARYNGOLOGY

## 2018-01-22 PROCEDURE — 99212 OFFICE O/P EST SF 10 MIN: CPT | Mod: PBBFAC,25 | Performed by: OTOLARYNGOLOGY

## 2018-01-22 PROCEDURE — 99999 PR PBB SHADOW E&M-EST. PATIENT-LVL II: CPT | Mod: PBBFAC,,, | Performed by: OTOLARYNGOLOGY

## 2018-01-22 NOTE — LETTER
January 22, 2018      Adilson Stock MD  [address]  [facility] [address]           [facility]  [address]  [facility] [address]  Phone: [phone]  Fax: [phone]          Patient: Bud Russo III   MR Number: [id number]   YOB: 1950   Date of Visit: 1/22/2018       Dear Dr. Adilson Stock:    Thank you for referring Bud Russo to me for evaluation. Attached you will find relevant portions of my assessment and plan of care.    If you have questions, please do not hesitate to call me. I look forward to following Bud Russo along with you.    Sincerely,    Wilbur Zimmerman MD    Enclosure  CC:  No Recipients    If you would like to receive this communication electronically, please contact [email or web address] or [phone] to request more information on Mobile Back[facility] Link access.    For providers and/or their staff who would like to refer a patient to [facility], please contact us through our one-stop-shop provider referral line, [facility], at [phone].    If you feel you have received this communication in error or would no longer like to receive these types of communications, please e-mail [email or web address]

## 2018-01-22 NOTE — PROGRESS NOTES
Chief Complaint   Patient presents with    Consult       HPI   67 y.o. male presents from Dr. Stock for evaluation of intermittent epistaxis and hoarseness.  He is under treatment with immunotherapy for metastatic renal cell carcinoma. He reports that his epistaxis has been bothersome over the last several mos.  He has been moisturizing his nose with saline spray and Aquaphor and reports that these measures have helped his bleeding.  He also feels that his hoarseness is due to an effect of immunotherapy and was recently exacerbated by a bout with the flu but is now improving.      Review of Systems   Constitutional: Negative for fatigue and unexpected weight change.   HENT: Per HPI.  Eyes: Negative for visual disturbance.   Respiratory: Negative for shortness of breath, hemoptysis   Cardiovascular: Negative for chest pain and palpitations.   Musculoskeletal: Negative for decreased ROM, back pain.   Skin: Negative for rash, sunburn, itching.   Neurological: Negative for dizziness and seizures.   Hematological: Negative for adenopathy. Does not bruise/bleed easily.   Endocrine: Negative for rapid weight loss/weight gain, heat/cold intolerance.     Past Medical History   Patient Active Problem List   Diagnosis    HTN (hypertension)    PVC (premature ventricular contraction)    Vestibulopathy    Heart palpitations    Liver metastases    Encounter for antineoplastic chemotherapy    Anemia    Renal cell carcinoma    Anemia in neoplastic disease           Past Surgical History   No past surgical history on file.      Family History   Family History   Problem Relation Age of Onset    Heart attack Neg Hx     Heart disease Neg Hx     Hypertension Neg Hx            Social History   .  Social History     Social History    Marital status:      Spouse name: N/A    Number of children: N/A    Years of education: N/A     Occupational History    Not on file.     Social History Main Topics    Smoking  status: Never Smoker    Smokeless tobacco: Never Used    Alcohol use Yes    Drug use: Unknown    Sexual activity: Not on file     Other Topics Concern    Not on file     Social History Narrative    No narrative on file         Allergies   Review of patient's allergies indicates:   Allergen Reactions    Adhesive tape-silicones      Other reaction(s): Rash    Sulfa (sulfonamide antibiotics)      Other reaction(s): Hives           Physical Exam     Vitals:    01/22/18 1022   BP: (!) 151/86   Pulse: 93         Body mass index is 32.17 kg/m².      General: AOx3, NAD   Respiratory:  Symmetric chest rise, normal effort  Right Ear: External Auditory Canal WNL,TM w/o masses/lesions/perforations.  Middle ear without evidence of effusion.   Left Ear: External Auditory Canal WNL,TM w/o masses/lesions/perforations.  Middle ear without evidence of effusion.   Nose: No gross nasal septal deviation. Inferior Turbinates WNL bilaterally. No septal perforation. No masses/lesions.   Oral Cavity:  Oral Tongue mobile, no lesions noted. Hard Palate WNL. No buccal or FOM lesions.  Oropharynx:  No masses/lesions of the posterior pharyngeal wall. Tonsillar fossa without lesions. Soft palate without masses. Midline uvula.   Neck: No scars.  No cervical lymphadenopathy, thyromegaly or thyroid nodules.  Normal range of motion.    Face: House Brackmann I bilaterally.     Flex Naso Esther Hypo Procedures #2    Procedure:  Diagnostic flexible nasopharyngoscopy, laryngoscopy and hypopharyngoscopy:    Routine preparation with local atomizer with 1% neosynephrine/pontocaine with customary flexible endoscope.    Nasal cavity:  Diffusely dry and crusted throughout.   Nasopharynx:  No lesions.   Mucosa:  No lesions.   Adenoids:  Present.  Posterior Choanae:  Patent.  Eustachian Tubes:  Patent.  Posterior pharynx:  No lesions.  Larynx/hypopharynx:   Epiglottis:  No lesions, without edema.   AE Folds:  No lesions.   Vocal cords:  No polyps, nodules,  ulcers or lesions.   Mobility:  Equal and normal bilateral.   Hypopharynx:  No lesions.   Piriform sinus:  No pooling, no lesions.   Post Cricoid:  No erythema, no edema.    Assessment/Plan  Problem List Items Addressed This Visit     None      Visit Diagnoses     Epistaxis    -  Primary    Hoarseness        Diffuse nasal dryness and irritation.  Continue Aquaphor and Saline.  His larynx exam is WNL. RTC PRN.

## 2018-01-27 DIAGNOSIS — I10 ESSENTIAL HYPERTENSION: Primary | ICD-10-CM

## 2018-01-27 RX ORDER — DILTIAZEM HYDROCHLORIDE 240 MG/1
240 CAPSULE, COATED, EXTENDED RELEASE ORAL DAILY
Qty: 30 CAPSULE | Refills: 11 | Status: ON HOLD | OUTPATIENT
Start: 2018-01-27 | End: 2018-03-07

## 2018-02-05 ENCOUNTER — TELEPHONE (OUTPATIENT)
Dept: HEMATOLOGY/ONCOLOGY | Facility: CLINIC | Age: 68
End: 2018-02-05

## 2018-02-05 NOTE — TELEPHONE ENCOUNTER
----- Message from Raffaele Peters sent at 2/5/2018 11:56 AM CST -----  Contact: Spouse  Will like to reschedule lab appt to next week due to pt having a stomach virus     Contact::[phone]

## 2018-02-08 ENCOUNTER — TELEPHONE (OUTPATIENT)
Dept: PHARMACY | Facility: CLINIC | Age: 68
End: 2018-02-08

## 2018-02-08 NOTE — TELEPHONE ENCOUNTER
Refill readiness for Lenvima and Afinitor confirmed with patient; name/ confirmed; no missed doses; no new medications; no side effects noted; address confirmed for 02/15 shipment and  delivery.

## 2018-02-14 ENCOUNTER — LAB VISIT (OUTPATIENT)
Dept: LAB | Facility: HOSPITAL | Age: 68
End: 2018-02-14
Attending: INTERNAL MEDICINE
Payer: MEDICARE

## 2018-02-14 DIAGNOSIS — Z51.11 ENCOUNTER FOR ANTINEOPLASTIC CHEMOTHERAPY: ICD-10-CM

## 2018-02-14 LAB
BACTERIA #/AREA URNS HPF: ABNORMAL /HPF
BILIRUB UR QL STRIP: ABNORMAL
CLARITY UR: CLEAR
COLOR UR: YELLOW
GLUCOSE UR QL STRIP: NEGATIVE
HGB UR QL STRIP: NEGATIVE
HYALINE CASTS #/AREA URNS LPF: 1 /LPF
KETONES UR QL STRIP: ABNORMAL
LEUKOCYTE ESTERASE UR QL STRIP: NEGATIVE
MICROSCOPIC COMMENT: ABNORMAL
NITRITE UR QL STRIP: NEGATIVE
PH UR STRIP: 6 [PH] (ref 5–8)
PROT UR QL STRIP: ABNORMAL
RBC #/AREA URNS HPF: 1 /HPF (ref 0–4)
SP GR UR STRIP: 1.02 (ref 1–1.03)
SQUAMOUS #/AREA URNS HPF: 2 /HPF
URN SPEC COLLECT METH UR: ABNORMAL
WBC #/AREA URNS HPF: 1 /HPF (ref 0–5)

## 2018-02-14 PROCEDURE — 81000 URINALYSIS NONAUTO W/SCOPE: CPT | Mod: PO

## 2018-02-15 ENCOUNTER — DOCUMENTATION ONLY (OUTPATIENT)
Dept: HEMATOLOGY/ONCOLOGY | Facility: CLINIC | Age: 68
End: 2018-02-15

## 2018-02-15 DIAGNOSIS — N06.0 ISOLATED PROTEINURIA WITH MINOR GLOMERULAR ABNORMALITY: Primary | ICD-10-CM

## 2018-02-15 DIAGNOSIS — Z51.11 ENCOUNTER FOR ANTINEOPLASTIC CHEMOTHERAPY: Primary | ICD-10-CM

## 2018-02-15 DIAGNOSIS — D50.9 HYPOCHROMIC ANEMIA: ICD-10-CM

## 2018-02-15 NOTE — PROGRESS NOTES
"Elevated AST/ALT noted in yesterday's labs.    Patient states that both he and his wife have been sick "with a virus", and that he has been experiencing nausea, poor appetite, and vomiting.  We will repeat his LFTs and check a ferritin level in a week.  "

## 2018-02-23 NOTE — PROGRESS NOTES
Subjective:       Patient ID: Bud Russo III is a 67 y.o. male.    Chief Complaint: No chief complaint on file.    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  He has been on the combination of lenvatinib plus everolimus for ten weeks now, and he has tolerated it extremely well.  The dose of lenvatinib has been 14 mg QD.    His restaging scans from last month had shown that the dominant liver lesion has shrunk significantly, from more than 11 cm to 6.5 cm.  Some of the other liver lesions though have slightly increased in size.     His CBC shows a WBC count of 5,900 /mm3, Hg 12.0 gr/dl, Ht 38.8 % and platelets 159,000 /mm3, with an MCV of 77.  His creatinine is 2.0 mg/dl,  albumin 2.3 gr/dl, and and his bilirubin is 0.5 mg/dl.  His AST is 171 and his ALT is 104 U/L.    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  His diagnosis was established on April 30th when he presented to an Emergency Room in [place], [place], where he lived with left flank pain.  Radiologic studies showed a mass in his left kidney.  Subsequently, he went to [place], [place], where he was staged and was found to have a mass in his liver.  On 05/23/2017, he   underwent an embolization of his tumor in preparation for a possible liver resection; and on 06/06/2017, he underwent a left nephrectomy.  He remained in the [place] area in preparation of a liver resection; however, an MRI of his abdomen and pelvis that was done showed that there was significant progression of the metastatic liver disease with interval increase in size of a dominant right hepatic lobe mass from 6.1 cm previously to 11.2 cm with evidence of internal necrosis and peripheral enhancement.  In addition, multiple new lesions were identified in both hepatic lobes, measuring up to 1.9 cm in the left lateral lobe and 1.2 cm in the right hepatic lobe.  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  The patient met with an oncologist at [facility], and he was offered chemotherapy.  He had decided to return to [place] and receive chemotherapy at our facility.  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  He started chemotherapy several months ago, and completed three cycles.   Unfortunately the staging MRI scan showed that compared to his prior CT of July 19, 2017 there has been an interval increase in number and size of the foci of hepatic metastatic disease within the liver, hence the initiation of second line treatment with the combination of lenvatinib plus afinitor, which he has tolerated well so far.  Repeat abdominal MRi scan last week with results as mentioned above.    Review of Systems      Overall he feels fair and states that he has been feeling sick for 2-3 weeks now.   Apparently both he and his wife have been sick with nausea, abdominal pain, and diarrhea.  His wife notes that his symptoms started after he was switched to verapamyl by his nephrologist at [facility].   He has not had a fever for the last month.  His appetite has been poor, and he complains of abdominal pain every time he eats.  His pain is relieved by passing gas and by belching.  He mentions that he ahs been experiencing intermittent epistaxis.  He denies any anxiety, depression, easy bruising, fevers, chills, night  sweats, weight loss, nausea, vomiting, diarrhea, constipation, diplopia, blurred vision, headache, chest pain, palpitations, shortness of breath, breast pain, abdominal pain, extremity pain, or difficulty ambulating.  The remainder of the ten-point ROS, including general, skin, lymph, H/N, cardiorespiratory, GI, , Neuro, Endocrine, and psychiatric is negative.     Objective:      Physical Exam    He is alert, oriented to  time, place, person, pleasant, well      nourished, in no acute physical distress.  He is here with his wife.                                  VITAL SIGNS:  Reviewed                                      HEENT:  Normal.  There are no nasal, oral, lip, gingival, auricular, lid,    or conjunctival lesions.  Mucosae are moist and pink, and there is no        thrush.  Pupils are equal, reactive to light and accommodation.              Extraocular muscle movements are intact.     Dentition is good.                                  NECK:  Supple without JVD, adenopathy, or thyromegaly.                       LUNGS:  Clear to auscultation without wheezing, rales, or rhonchi.           CARDIOVASCULAR:  Reveals an S1, S2, no murmurs, no rubs, no gallops.         ABDOMEN:  Soft, nontender, without organomegaly.  Bowel sounds are  present.  A scar from his left nephrectomy is identified.     Scars from bilateral varicocele surgeries are identified.                                                                 EXTREMITIES:  No cyanosis, clubbing, or edema.                                                               LYMPHATIC:  There is no cervical, axillary, or supraclavicular adenopathy.   SKIN:  Warm and moist, without petechiae, rashes, induration, or ecchymoses.           NEUROLOGIC:  DTRs are 0-1+ bilaterally, symmetrical, motor function is 5/5,  and cranial nerves are  within normal limits.  Both fundi are sharp.    Assessment:       1. Renal cell carcinoma of kidney excluding renal pelvis    2. CRF, imrpoved    3. Liver metastases    4. Encounter for antineoplastic chemotherapy     5.     Intermittent epistaxis.  6.      Increased transaminases.  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  He will remain on the combination of lenvatinib plus everolimus, and we will maintain his lenvatinib to 14 mg daily.  He will remain off the diltiazem, and will restart verapamyl instead.  We will administer 1  liter of N./S today, and tentatively one on Wednesday and Friday at the [facility].   I am inclined to wait for another week, but if his symptoms do not improve, I would proceed with rescanning his abdomen and pelvis.  I will see him in a week with repeat labs.  His multiple questions and his wife's multiple questions were answered to his satisfaction.

## 2018-02-26 ENCOUNTER — INFUSION (OUTPATIENT)
Dept: INFUSION THERAPY | Facility: HOSPITAL | Age: 68
End: 2018-02-26
Attending: INTERNAL MEDICINE
Payer: MEDICARE

## 2018-02-26 ENCOUNTER — OFFICE VISIT (OUTPATIENT)
Dept: HEMATOLOGY/ONCOLOGY | Facility: CLINIC | Age: 68
End: 2018-02-26
Payer: MEDICARE

## 2018-02-26 VITALS
RESPIRATION RATE: 18 BRPM | SYSTOLIC BLOOD PRESSURE: 160 MMHG | OXYGEN SATURATION: 99 % | DIASTOLIC BLOOD PRESSURE: 100 MMHG | TEMPERATURE: 98 F | HEIGHT: 72 IN | WEIGHT: 194 LBS | BODY MASS INDEX: 26.28 KG/M2

## 2018-02-26 VITALS
TEMPERATURE: 97 F | SYSTOLIC BLOOD PRESSURE: 175 MMHG | HEART RATE: 110 BPM | RESPIRATION RATE: 18 BRPM | DIASTOLIC BLOOD PRESSURE: 105 MMHG

## 2018-02-26 DIAGNOSIS — C64.2 RENAL CELL CARCINOMA OF LEFT KIDNEY: Primary | ICD-10-CM

## 2018-02-26 DIAGNOSIS — E86.0 DEHYDRATION: Primary | ICD-10-CM

## 2018-02-26 DIAGNOSIS — C78.7 LIVER METASTASES: ICD-10-CM

## 2018-02-26 DIAGNOSIS — Z51.11 ENCOUNTER FOR ANTINEOPLASTIC CHEMOTHERAPY: ICD-10-CM

## 2018-02-26 DIAGNOSIS — Z51.11 ENCOUNTER FOR ANTINEOPLASTIC CHEMOTHERAPY: Primary | ICD-10-CM

## 2018-02-26 DIAGNOSIS — E86.0 DEHYDRATION: ICD-10-CM

## 2018-02-26 DIAGNOSIS — D63.0 ANEMIA IN NEOPLASTIC DISEASE: ICD-10-CM

## 2018-02-26 LAB
BACTERIA #/AREA URNS AUTO: NORMAL /HPF
BILIRUB UR QL STRIP: NEGATIVE
CLARITY UR REFRACT.AUTO: CLEAR
COLOR UR AUTO: YELLOW
GLUCOSE UR QL STRIP: NEGATIVE
HGB UR QL STRIP: NEGATIVE
HYALINE CASTS UR QL AUTO: 0 /LPF
KETONES UR QL STRIP: NEGATIVE
LEUKOCYTE ESTERASE UR QL STRIP: NEGATIVE
MICROSCOPIC COMMENT: NORMAL
NITRITE UR QL STRIP: NEGATIVE
PH UR STRIP: 5 [PH] (ref 5–8)
PROT UR QL STRIP: ABNORMAL
RBC #/AREA URNS AUTO: 0 /HPF (ref 0–4)
SP GR UR STRIP: 1.02 (ref 1–1.03)
SQUAMOUS #/AREA URNS AUTO: 0 /HPF
URN SPEC COLLECT METH UR: ABNORMAL
UROBILINOGEN UR STRIP-ACNC: 2 EU/DL
WBC #/AREA URNS AUTO: 1 /HPF (ref 0–5)

## 2018-02-26 PROCEDURE — 81001 URINALYSIS AUTO W/SCOPE: CPT

## 2018-02-26 PROCEDURE — 1159F MED LIST DOCD IN RCRD: CPT | Mod: ,,, | Performed by: INTERNAL MEDICINE

## 2018-02-26 PROCEDURE — 96360 HYDRATION IV INFUSION INIT: CPT

## 2018-02-26 PROCEDURE — 99215 OFFICE O/P EST HI 40 MIN: CPT | Mod: 25,S$PBB,, | Performed by: INTERNAL MEDICINE

## 2018-02-26 PROCEDURE — 96361 HYDRATE IV INFUSION ADD-ON: CPT

## 2018-02-26 PROCEDURE — 99999 PR PBB SHADOW E&M-EST. PATIENT-LVL I: CPT | Mod: PBBFAC,,, | Performed by: INTERNAL MEDICINE

## 2018-02-26 PROCEDURE — 99211 OFF/OP EST MAY X REQ PHY/QHP: CPT | Mod: PBBFAC,25 | Performed by: INTERNAL MEDICINE

## 2018-02-26 PROCEDURE — 25000003 PHARM REV CODE 250: Performed by: INTERNAL MEDICINE

## 2018-02-26 RX ORDER — HYDROCORTISONE ACETATE 25 MG/1
25 SUPPOSITORY RECTAL 2 TIMES DAILY
Qty: 12 SUPPOSITORY | Refills: 1 | Status: SHIPPED | OUTPATIENT
Start: 2018-02-26 | End: 2019-02-26

## 2018-02-26 RX ORDER — MUPIROCIN CALCIUM 20 MG/G
CREAM TOPICAL
Qty: 30 G | Refills: 0 | Status: SHIPPED | OUTPATIENT
Start: 2018-02-26 | End: 2019-02-26

## 2018-02-26 RX ADMIN — SODIUM CHLORIDE 1000 ML: 9 INJECTION, SOLUTION INTRAVENOUS at 11:02

## 2018-02-26 NOTE — TELEPHONE ENCOUNTER
Called and spoke with patient and gave him his apt times for Wednesday and Friday for his IV fluids on the [facility].

## 2018-02-26 NOTE — PLAN OF CARE
Problem: Fluid Volume Deficit (Adult)  Goal: Identify Related Risk Factors and Signs and Symptoms  Related risk factors and signs and symptoms are identified upon initiation of Human Response Clinical Practice Guideline (CPG)  Outcome: Ongoing (interventions implemented as appropriate)  Pt here for IV fluids, accompained by spouse, spouse reports pt w/ prior virus and now diarrhea, diminished appetite x 8 days, spouse reports pt unwilling to hydrate adequately at home; spouse reports recent change in BP meds which may have contributed to diarrhea and pt now off BP med, states pt will resume BP meds at home today (current /105); discussed treatment plan for today and pt agrees to proceed

## 2018-02-26 NOTE — TELEPHONE ENCOUNTER
----- Message from Adilson Stock MD sent at 2/26/2018 10:53 AM CST -----  Please set him up for a liter on N/S on Wednesday and anotehr one on Friday at the [facility].  See me Monday with lisset

## 2018-02-26 NOTE — PLAN OF CARE
Problem: Patient Care Overview  Goal: Plan of Care Review  Outcome: Ongoing (interventions implemented as appropriate)  Infusion completed, pt tolerated well; instructed to increase daily hydration at home; instructed to contact MD for any needs or concerns; spouse declined printed AVS, pt and spouse verbalized understanding of next scheduled appt

## 2018-02-27 ENCOUNTER — TELEPHONE (OUTPATIENT)
Dept: SURGERY | Facility: CLINIC | Age: 68
End: 2018-02-27

## 2018-02-28 ENCOUNTER — INFUSION (OUTPATIENT)
Dept: INFUSION THERAPY | Facility: HOSPITAL | Age: 68
End: 2018-02-28
Attending: INTERNAL MEDICINE
Payer: MEDICARE

## 2018-02-28 VITALS
DIASTOLIC BLOOD PRESSURE: 92 MMHG | TEMPERATURE: 98 F | SYSTOLIC BLOOD PRESSURE: 141 MMHG | OXYGEN SATURATION: 99 % | RESPIRATION RATE: 18 BRPM | HEART RATE: 95 BPM

## 2018-02-28 DIAGNOSIS — E86.0 DEHYDRATION: Primary | ICD-10-CM

## 2018-02-28 DIAGNOSIS — R80.9 PROTEINURIA, UNSPECIFIED TYPE: Primary | ICD-10-CM

## 2018-02-28 PROCEDURE — 25000003 PHARM REV CODE 250: Mod: PN | Performed by: INTERNAL MEDICINE

## 2018-02-28 PROCEDURE — 96360 HYDRATION IV INFUSION INIT: CPT | Mod: PN

## 2018-02-28 RX ADMIN — SODIUM CHLORIDE: 0.9 INJECTION, SOLUTION INTRAVENOUS at 02:02

## 2018-02-28 NOTE — PLAN OF CARE
Problem: Patient Care Overview  Goal: Discharge Needs Assessment  Adequate for discharge.   Pt tolerated infusion without noted distress.  Reviewed upcoming appointments.  All questions answered.  Ambulated from infusion with wife

## 2018-03-01 DIAGNOSIS — R10.9 ABDOMINAL PAIN: ICD-10-CM

## 2018-03-01 DIAGNOSIS — R10.10 PAIN OF UPPER ABDOMEN: Primary | ICD-10-CM

## 2018-03-02 ENCOUNTER — TELEPHONE (OUTPATIENT)
Dept: INFUSION THERAPY | Facility: HOSPITAL | Age: 68
End: 2018-03-02

## 2018-03-02 NOTE — TELEPHONE ENCOUNTER
----- Message from Susan Merrill sent at 3/2/2018 10:30 AM CST -----  Contact: denise      ----- Message -----  From: Keri aJck  Sent: 3/2/2018  10:20 AM  To: [facility] Infusion Clinical Support    Denise patient's wife called to advise that patient is now eating and drinking on his own. Call to cancel appointment for today. If any questions call back at [phone],

## 2018-03-05 ENCOUNTER — OFFICE VISIT (OUTPATIENT)
Dept: HEMATOLOGY/ONCOLOGY | Facility: CLINIC | Age: 68
DRG: 375 | End: 2018-03-05
Payer: MEDICARE

## 2018-03-05 ENCOUNTER — LAB VISIT (OUTPATIENT)
Dept: LAB | Facility: HOSPITAL | Age: 68
End: 2018-03-05
Attending: INTERNAL MEDICINE
Payer: MEDICARE

## 2018-03-05 VITALS
WEIGHT: 194 LBS | HEIGHT: 65 IN | HEART RATE: 105 BPM | RESPIRATION RATE: 18 BRPM | OXYGEN SATURATION: 99 % | BODY MASS INDEX: 32.32 KG/M2 | DIASTOLIC BLOOD PRESSURE: 75 MMHG | TEMPERATURE: 98 F | SYSTOLIC BLOOD PRESSURE: 114 MMHG

## 2018-03-05 DIAGNOSIS — Z51.11 ENCOUNTER FOR ANTINEOPLASTIC CHEMOTHERAPY: ICD-10-CM

## 2018-03-05 DIAGNOSIS — D63.0 ANEMIA IN NEOPLASTIC DISEASE: ICD-10-CM

## 2018-03-05 DIAGNOSIS — C78.7 LIVER METASTASES: ICD-10-CM

## 2018-03-05 DIAGNOSIS — C64.9 RENAL CELL CARCINOMA, UNSPECIFIED LATERALITY: Primary | ICD-10-CM

## 2018-03-05 DIAGNOSIS — R60.0 LOCALIZED EDEMA: ICD-10-CM

## 2018-03-05 DIAGNOSIS — D50.9 HYPOCHROMIC ANEMIA: ICD-10-CM

## 2018-03-05 LAB
ALBUMIN SERPL BCP-MCNC: 2.1 G/DL
ALP SERPL-CCNC: 407 U/L
ALT SERPL W/O P-5'-P-CCNC: 35 U/L
ANION GAP SERPL CALC-SCNC: 10 MMOL/L
AST SERPL-CCNC: 79 U/L
BASOPHILS # BLD AUTO: 0.02 K/UL
BASOPHILS NFR BLD: 0.3 %
BILIRUB SERPL-MCNC: 0.5 MG/DL
BUN SERPL-MCNC: 18 MG/DL
CALCIUM SERPL-MCNC: 8.7 MG/DL
CHLORIDE SERPL-SCNC: 104 MMOL/L
CO2 SERPL-SCNC: 23 MMOL/L
CREAT SERPL-MCNC: 2 MG/DL
DIFFERENTIAL METHOD: ABNORMAL
EOSINOPHIL # BLD AUTO: 0.1 K/UL
EOSINOPHIL NFR BLD: 1 %
ERYTHROCYTE [DISTWIDTH] IN BLOOD BY AUTOMATED COUNT: 22.8 %
EST. GFR  (AFRICAN AMERICAN): 38.8 ML/MIN/1.73 M^2
EST. GFR  (NON AFRICAN AMERICAN): 33.5 ML/MIN/1.73 M^2
FERRITIN SERPL-MCNC: 2723 NG/ML
GLUCOSE SERPL-MCNC: 105 MG/DL
HCT VFR BLD AUTO: 35.3 %
HGB BLD-MCNC: 11.1 G/DL
IMM GRANULOCYTES # BLD AUTO: 0.04 K/UL
IMM GRANULOCYTES NFR BLD AUTO: 0.7 %
LYMPHOCYTES # BLD AUTO: 1.1 K/UL
LYMPHOCYTES NFR BLD: 18.5 %
MCH RBC QN AUTO: 24.2 PG
MCHC RBC AUTO-ENTMCNC: 31.4 G/DL
MCV RBC AUTO: 77 FL
MONOCYTES # BLD AUTO: 1.1 K/UL
MONOCYTES NFR BLD: 18.7 %
NEUTROPHILS # BLD AUTO: 3.6 K/UL
NEUTROPHILS NFR BLD: 60.8 %
NRBC BLD-RTO: 0 /100 WBC
PLATELET # BLD AUTO: 255 K/UL
PMV BLD AUTO: 8.7 FL
POTASSIUM SERPL-SCNC: 3.5 MMOL/L
PROT SERPL-MCNC: 6.1 G/DL
RBC # BLD AUTO: 4.58 M/UL
SODIUM SERPL-SCNC: 137 MMOL/L
WBC # BLD AUTO: 5.95 K/UL

## 2018-03-05 PROCEDURE — 99215 OFFICE O/P EST HI 40 MIN: CPT | Mod: S$PBB,,, | Performed by: INTERNAL MEDICINE

## 2018-03-05 PROCEDURE — 82728 ASSAY OF FERRITIN: CPT

## 2018-03-05 PROCEDURE — 36415 COLL VENOUS BLD VENIPUNCTURE: CPT

## 2018-03-05 PROCEDURE — 85025 COMPLETE CBC W/AUTO DIFF WBC: CPT

## 2018-03-05 PROCEDURE — 80053 COMPREHEN METABOLIC PANEL: CPT

## 2018-03-05 RX ORDER — FUROSEMIDE 40 MG/1
TABLET ORAL
Qty: 30 TABLET | Refills: 1 | Status: SHIPPED | OUTPATIENT
Start: 2018-03-05 | End: 2018-03-05 | Stop reason: SDUPTHER

## 2018-03-05 RX ORDER — FUROSEMIDE 40 MG/1
TABLET ORAL
Qty: 90 TABLET | Refills: 1 | Status: SHIPPED | OUTPATIENT
Start: 2018-03-05 | End: 2018-08-07

## 2018-03-05 NOTE — PROGRESS NOTES
Subjective:       Patient ID: Bud Russo III is a 67 y.o. male.    Chief Complaint: No chief complaint on file.    HPI     Rafaela returns today for follow up.  He has been on the combination of lenvatinib plus everolimus for eleven weeks now, and although in the beginning he had tolerated it very well, for the last 7-10 days he has had signifciant gastrointestinal symptoms and as of 2 days ago he has discontinued both medications, at my advice.   The dose of lenvatinib had been 14 mg QD.    His restaging scans from last month had shown that the dominant liver lesion has shrunk significantly, from more than 11 cm to 6.5 cm.  Some of the other liver lesions though have slightly increased in size.     His CBC shows a WBC count of 5,900 /mm3, Hg 11.1 gr/dl, Ht 38.3 % and platelets 255,000 /mm3, with an MCV of 77.  His creatinine is 2.0 mg/dl,  albumin 2.1 gr/dl, and and his bilirubin is 0.5 mg/dl.  His AST is 79 and his ALT is 35 U/L.  A 24 hour urine protein is currently pending.      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  His diagnosis was established on April 30th when he presented to an Emergency Room in [place], [place], where he lived with left flank pain.  Radiologic studies showed a mass in his left kidney.  Subsequently, he went to [place], [place], where he was staged and was found to have a mass in his liver.  On 05/23/2017, he   underwent an embolization of his tumor in preparation for a possible liver resection; and on 06/06/2017, he underwent a left nephrectomy.  He remained in the [place] area in preparation of a liver resection; however, an MRI of his abdomen and pelvis that was done showed that there was significant progression of the metastatic liver disease with interval increase in size of a dominant right hepatic lobe mass from 6.1 cm previously to 11.2 cm with evidence of internal necrosis and peripheral enhancement.   In addition, multiple new lesions were identified in both hepatic lobes, measuring up to 1.9 cm in the left lateral lobe and 1.2 cm in the right hepatic lobe.  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  The patient met with an oncologist at [place], and he was offered chemotherapy.  He had decided to return to [place] and receive chemotherapy at our facility.  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  He started chemotherapy several months ago, and completed three cycles.   Unfortunately the staging MRI scan showed that compared to his prior CT of July 19, 2017 there has been an interval increase in number and size of the foci of hepatic metastatic disease within the liver, hence the initiation of second line treatment with the combination of lenvatinib plus afinitor, which he has tolerated well so far.  Repeat abdominal MRi scan last week with results as mentioned above.    Review of Systems      Overall he feels much better since discontinuing the lenvatinib and everolimus.   He is able to eat, and although he does feel weak, he states that overall he has improved from last week.   He has not had a fever for the last month.  He denies any anxiety, depression, easy bruising, fevers, chills, night  sweats, weight loss, nausea, vomiting, diarrhea, constipation, diplopia, blurred vision, headache, chest pain, palpitations, shortness of breath, breast pain, abdominal pain, extremity pain, or difficulty ambulating.  The remainder of the ten-point ROS, including general, skin, lymph, H/N, cardiorespiratory, GI, , Neuro, Endocrine, and psychiatric is negative.     Objective:      Physical Exam    He is alert, oriented to time, place, person, pleasant, well      nourished, in no acute physical distress.  He is  here with his wife.                                  VITAL SIGNS:  Reviewed                                      HEENT:  Normal.  There are no nasal, oral, lip, gingival, auricular, lid,    or conjunctival lesions.  Mucosae are moist and pink, and there is no        thrush.  Pupils are equal, reactive to light and accommodation.              Extraocular muscle movements are intact.     Dentition is good.                                  NECK:  Supple without JVD, adenopathy, or thyromegaly.                       LUNGS:  Clear to auscultation without wheezing, rales, or rhonchi.           CARDIOVASCULAR:  Reveals an S1, S2, no murmurs, no rubs, no gallops.         ABDOMEN:  Soft, nontender, without organomegaly.  Bowel sounds are  present.  A scar from his left nephrectomy is identified.     Scars from bilateral varicocele surgeries are identified.                                                                 EXTREMITIES:  No cyanosis, clubbing, or edema.                                                               LYMPHATIC:  There is no cervical, axillary, or supraclavicular adenopathy.   SKIN:  Warm and moist, without petechiae, rashes, induration, or ecchymoses.           NEUROLOGIC:  DTRs are 0-1+ bilaterally, symmetrical, motor function is 5/5,  and cranial nerves are  within normal limits.  Both fundi are sharp.    Assessment:       1. Renal cell carcinoma of kidney excluding renal pelvis    2. CRF, imrpoved    3. Liver metastases    4. Encounter for antineoplastic chemotherapy     5.     Intermittent epistaxis.  6.      Increased transaminases, improving.  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  For now he will remain off the combination of lenvatinib plus everolimus, and if he continues to improve, he may restart it in the next 24-48 hours.  He stated in no uncertain terms that he is not willing to try the 14 mg dose; he will therefore be started at 10 mg of lenvatinib in  addition to the everolimus.   He will remain on verapamyl since his BP appears to be well controlled, and we will add lasix 40 mg daily for his lower extremity edema.  We will recheck his CMP in a week, and if he remains stable, I will see him in three weeks with repeat labs.  His multiple questions were answered to his satisfaction.

## 2018-03-07 ENCOUNTER — TELEPHONE (OUTPATIENT)
Dept: HEMATOLOGY/ONCOLOGY | Facility: CLINIC | Age: 68
End: 2018-03-07

## 2018-03-07 ENCOUNTER — INFUSION (OUTPATIENT)
Dept: INFUSION THERAPY | Facility: HOSPITAL | Age: 68
End: 2018-03-07
Attending: INTERNAL MEDICINE
Payer: MEDICARE

## 2018-03-07 ENCOUNTER — HOSPITAL ENCOUNTER (INPATIENT)
Facility: HOSPITAL | Age: 68
LOS: 2 days | Discharge: HOME | DRG: 375 | End: 2018-03-09
Attending: INTERNAL MEDICINE | Admitting: INTERNAL MEDICINE
Payer: MEDICARE

## 2018-03-07 VITALS
SYSTOLIC BLOOD PRESSURE: 127 MMHG | TEMPERATURE: 98 F | RESPIRATION RATE: 18 BRPM | DIASTOLIC BLOOD PRESSURE: 75 MMHG | HEART RATE: 94 BPM

## 2018-03-07 DIAGNOSIS — K59.00 CONSTIPATION, UNSPECIFIED CONSTIPATION TYPE: ICD-10-CM

## 2018-03-07 DIAGNOSIS — Z51.11 ENCOUNTER FOR ANTINEOPLASTIC CHEMOTHERAPY: Primary | ICD-10-CM

## 2018-03-07 DIAGNOSIS — E86.0 DEHYDRATION: Primary | ICD-10-CM

## 2018-03-07 DIAGNOSIS — R00.2 HEART PALPITATIONS: ICD-10-CM

## 2018-03-07 DIAGNOSIS — Z51.11 ENCOUNTER FOR ANTINEOPLASTIC CHEMOTHERAPY: ICD-10-CM

## 2018-03-07 DIAGNOSIS — C64.9 RENAL CELL CARCINOMA, UNSPECIFIED LATERALITY: Primary | ICD-10-CM

## 2018-03-07 DIAGNOSIS — R60.0 LOCALIZED EDEMA: ICD-10-CM

## 2018-03-07 DIAGNOSIS — R10.10 PAIN OF UPPER ABDOMEN: ICD-10-CM

## 2018-03-07 DIAGNOSIS — C78.7 LIVER METASTASES: ICD-10-CM

## 2018-03-07 DIAGNOSIS — E03.8 OTHER SPECIFIED HYPOTHYROIDISM: ICD-10-CM

## 2018-03-07 DIAGNOSIS — R10.9 ABDOMINAL PAIN: ICD-10-CM

## 2018-03-07 DIAGNOSIS — R53.81 DEBILITY: ICD-10-CM

## 2018-03-07 DIAGNOSIS — E86.0 DEHYDRATION: ICD-10-CM

## 2018-03-07 PROCEDURE — 25000003 PHARM REV CODE 250: Performed by: STUDENT IN AN ORGANIZED HEALTH CARE EDUCATION/TRAINING PROGRAM

## 2018-03-07 PROCEDURE — 25000003 PHARM REV CODE 250: Performed by: INTERNAL MEDICINE

## 2018-03-07 PROCEDURE — 99223 1ST HOSP IP/OBS HIGH 75: CPT | Mod: AI,,, | Performed by: INTERNAL MEDICINE

## 2018-03-07 PROCEDURE — 20600001 HC STEP DOWN PRIVATE ROOM

## 2018-03-07 PROCEDURE — 96360 HYDRATION IV INFUSION INIT: CPT

## 2018-03-07 RX ORDER — POLYETHYLENE GLYCOL 3350 17 G/17G
17 POWDER, FOR SOLUTION ORAL 2 TIMES DAILY PRN
Status: DISCONTINUED | OUTPATIENT
Start: 2018-03-07 | End: 2018-03-09 | Stop reason: HOSPADM

## 2018-03-07 RX ORDER — IBUPROFEN 200 MG
16 TABLET ORAL
Status: DISCONTINUED | OUTPATIENT
Start: 2018-03-07 | End: 2018-03-09 | Stop reason: HOSPADM

## 2018-03-07 RX ORDER — PANTOPRAZOLE SODIUM 40 MG/1
40 TABLET, DELAYED RELEASE ORAL DAILY
Status: DISCONTINUED | OUTPATIENT
Start: 2018-03-08 | End: 2018-03-09 | Stop reason: HOSPADM

## 2018-03-07 RX ORDER — ONDANSETRON 2 MG/ML
4 INJECTION INTRAMUSCULAR; INTRAVENOUS EVERY 6 HOURS PRN
Status: DISCONTINUED | OUTPATIENT
Start: 2018-03-07 | End: 2018-03-09 | Stop reason: HOSPADM

## 2018-03-07 RX ORDER — SENNOSIDES 8.6 MG/1
8.6 TABLET ORAL DAILY
Status: DISCONTINUED | OUTPATIENT
Start: 2018-03-08 | End: 2018-03-09 | Stop reason: HOSPADM

## 2018-03-07 RX ORDER — IBUPROFEN 200 MG
24 TABLET ORAL
Status: DISCONTINUED | OUTPATIENT
Start: 2018-03-07 | End: 2018-03-09 | Stop reason: HOSPADM

## 2018-03-07 RX ORDER — LORAZEPAM 1 MG/1
1 TABLET ORAL ONCE AS NEEDED
Status: ACTIVE | OUTPATIENT
Start: 2018-03-07 | End: 2018-03-07

## 2018-03-07 RX ORDER — SODIUM CHLORIDE 0.9 % (FLUSH) 0.9 %
5 SYRINGE (ML) INJECTION
Status: DISCONTINUED | OUTPATIENT
Start: 2018-03-07 | End: 2018-03-09 | Stop reason: HOSPADM

## 2018-03-07 RX ORDER — GLUCAGON 1 MG
1 KIT INJECTION
Status: DISCONTINUED | OUTPATIENT
Start: 2018-03-07 | End: 2018-03-09 | Stop reason: HOSPADM

## 2018-03-07 RX ORDER — VERAPAMIL HYDROCHLORIDE 180 MG/1
180 TABLET, FILM COATED, EXTENDED RELEASE ORAL DAILY
Status: DISCONTINUED | OUTPATIENT
Start: 2018-03-08 | End: 2018-03-08

## 2018-03-07 RX ORDER — SIMETHICONE 80 MG
1 TABLET,CHEWABLE ORAL 3 TIMES DAILY
Status: DISCONTINUED | OUTPATIENT
Start: 2018-03-07 | End: 2018-03-09 | Stop reason: HOSPADM

## 2018-03-07 RX ORDER — HEPARIN SODIUM 5000 [USP'U]/ML
5000 INJECTION, SOLUTION INTRAVENOUS; SUBCUTANEOUS EVERY 8 HOURS
Status: DISCONTINUED | OUTPATIENT
Start: 2018-03-07 | End: 2018-03-09 | Stop reason: HOSPADM

## 2018-03-07 RX ORDER — OXYCODONE HYDROCHLORIDE 5 MG/1
5 TABLET ORAL EVERY 4 HOURS PRN
Status: DISCONTINUED | OUTPATIENT
Start: 2018-03-07 | End: 2018-03-09

## 2018-03-07 RX ORDER — LEVOTHYROXINE SODIUM 75 UG/1
75 TABLET ORAL
Status: DISCONTINUED | OUTPATIENT
Start: 2018-03-08 | End: 2018-03-09 | Stop reason: HOSPADM

## 2018-03-07 RX ADMIN — SODIUM CHLORIDE: 0.9 INJECTION, SOLUTION INTRAVENOUS at 12:03

## 2018-03-07 RX ADMIN — OXYCODONE HYDROCHLORIDE 5 MG: 5 TABLET ORAL at 06:03

## 2018-03-07 NOTE — PLAN OF CARE
Problem: Fluid Volume Deficit (Adult)  Goal: Identify Related Risk Factors and Signs and Symptoms  Related risk factors and signs and symptoms are identified upon initiation of Human Response Clinical Practice Guideline (CPG)   Outcome: Ongoing (interventions implemented as appropriate)  Pt here for ivf, had lab work drawn on 3rd floor, pt states feel horrible, has nausea, unable to eat and has intermittent abdominal pain, reclined in chair, warm blankets provided, continue to monitor

## 2018-03-07 NOTE — HPI
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  He had stopped his lenvatinib and everolimus recently when he developed nausea, abdominal pain, and low oral intake, which seemed to improve after he stopped it.  He was seen by his primary oncologist who recommended staying off of the medications on his visit 3/5/18 with plans to restart in a few days at a lower dose.  However, unfortunately his symptoms recurred.  Developed intermittent abdominal pain from RLQ radiating across to left side of abdomen.  Associated with bloating and gas.  Relieved with belching.  Not passing flatus but having normal bowel movements still.  Worsened bloating and abdominal pain approximately 3-4 hours after eating.  Denies fevers, chills, diarrhea.  Had one episode of nausea and vomiting while at radiation oncology when he sipped some water.    Denies using any pain medications. Has used simethicone intermittently to assist with alleviating symptoms.  No worsening swelling. Low appetite due to bloating and abdominal pain.

## 2018-03-07 NOTE — SUBJECTIVE & OBJECTIVE
Oncology Treatment Plan:   OP BREAST GEMCITABINE QW    Medications:  Continuous Infusions:  Scheduled Meds:  PRN Meds:     Review of patient's allergies indicates:   Allergen Reactions    Adhesive tape-silicones      Other reaction(s): Rash    Sulfa (sulfonamide antibiotics)      Other reaction(s): Hives        Past Medical History:   Diagnosis Date    Hypertension     Ventricular tachycardia     frequent PVCs c/w outflow tract etiology     No past surgical history on file.  Family History     None        Social History Main Topics    Smoking status: Never Smoker    Smokeless tobacco: Never Used    Alcohol use Yes    Drug use: Unknown    Sexual activity: Not on file       Review of Systems   Constitutional: Positive for appetite change and fatigue. Negative for chills and fever.   HENT: Negative for nosebleeds.    Eyes: Negative for visual disturbance.   Respiratory: Negative for cough and shortness of breath.    Cardiovascular: Negative for chest pain and leg swelling.   Gastrointestinal: Positive for abdominal pain and nausea. Negative for constipation, diarrhea and vomiting.   Endocrine: Negative for polyuria.   Genitourinary: Negative for dysuria and hematuria.   Musculoskeletal: Negative for arthralgias.   Skin: Negative for color change.   Neurological: Positive for weakness. Negative for dizziness, syncope and light-headedness.   Hematological: Negative for adenopathy.   Psychiatric/Behavioral: Negative for confusion.     Objective:     Vital Signs (Most Recent):    Vital Signs (24h Range):  Temp:  [97.9 °F (36.6 °C)] 97.9 °F (36.6 °C)  Pulse:  [94] 94  Resp:  [18] 18  BP: (127)/(75) 127/75        There is no height or weight on file to calculate BMI.  There is no height or weight on file to calculate BSA.    No intake or output data in the 24 hours ending 03/07/18 1632    Physical Exam   Constitutional: He appears well-developed.   HENT:   Head: Normocephalic.   Eyes: EOM are normal. Pupils are  equal, round, and reactive to light. No scleral icterus.   Neck: Normal range of motion. No tracheal deviation present.   Cardiovascular: Regular rhythm and normal heart sounds.  Exam reveals no gallop and no friction rub.    No murmur heard.  tachycardic   Pulmonary/Chest: Effort normal and breath sounds normal. No respiratory distress. He has no wheezes. He has no rales.   Abdominal: Soft. Bowel sounds are normal. He exhibits no distension and no mass. There is tenderness (RUQ). There is guarding.   Musculoskeletal: He exhibits edema (1+ pitting edema ble ).   Lymphadenopathy:     He has no cervical adenopathy.   Neurological: He is alert.   Skin: Skin is warm and dry.   Psychiatric: He has a normal mood and affect.       Significant Labs:   CBC:   Recent Labs  Lab 03/07/18  1142   WBC 9.00   HGB 10.7*   HCT 34.7*       and CMP:   Recent Labs  Lab 03/07/18  1142   *   K 3.6      CO2 23   *   BUN 16   CREATININE 1.9*   CALCIUM 9.1   PROT 6.1   ALBUMIN 2.1*   BILITOT 0.6   ALKPHOS 333*   AST 78*   ALT 26   ANIONGAP 12   EGFRNONAA 35.7*       Diagnostic Results:  None

## 2018-03-07 NOTE — NURSING
1700-Telephone report given to HALI Vigil in [facility] unit. Patient transported by escort  in wheelchair to room 1068 accompanied by wife. Dr. Stock informed of bed assignment. Patient alert, oriented in no acute distress.

## 2018-03-07 NOTE — NURSING
Received patient, he is alert and oriented. He reports nausea, breath sounds are clear, he reports discomfort to his abdomen, which is rounded and firm. Notified MD of arrival.

## 2018-03-08 PROBLEM — K59.00 CONSTIPATION: Status: ACTIVE | Noted: 2018-03-08

## 2018-03-08 PROBLEM — K21.9 GASTROESOPHAGEAL REFLUX DISEASE WITHOUT ESOPHAGITIS: Status: ACTIVE | Noted: 2018-03-08

## 2018-03-08 PROBLEM — E03.8 OTHER SPECIFIED HYPOTHYROIDISM: Status: ACTIVE | Noted: 2018-03-08

## 2018-03-08 LAB
ALBUMIN SERPL BCP-MCNC: 1.9 G/DL
ALP SERPL-CCNC: 273 U/L
ALT SERPL W/O P-5'-P-CCNC: 19 U/L
ANION GAP SERPL CALC-SCNC: 13 MMOL/L
APTT BLDCRRT: 24.3 SEC
AST SERPL-CCNC: 65 U/L
BASOPHILS # BLD AUTO: 0.02 K/UL
BASOPHILS NFR BLD: 0.3 %
BILIRUB SERPL-MCNC: 0.5 MG/DL
BUN SERPL-MCNC: 14 MG/DL
CALCIUM SERPL-MCNC: 8.5 MG/DL
CHLORIDE SERPL-SCNC: 102 MMOL/L
CO2 SERPL-SCNC: 21 MMOL/L
CREAT SERPL-MCNC: 1.5 MG/DL
DIFFERENTIAL METHOD: ABNORMAL
EOSINOPHIL # BLD AUTO: 0.1 K/UL
EOSINOPHIL NFR BLD: 1 %
ERYTHROCYTE [DISTWIDTH] IN BLOOD BY AUTOMATED COUNT: 22.6 %
EST. GFR  (AFRICAN AMERICAN): 54.9 ML/MIN/1.73 M^2
EST. GFR  (NON AFRICAN AMERICAN): 47.5 ML/MIN/1.73 M^2
ESTIMATED AVG GLUCOSE: 131 MG/DL
GLUCOSE SERPL-MCNC: 116 MG/DL
HBA1C MFR BLD HPLC: 6.2 %
HCT VFR BLD AUTO: 30.8 %
HGB BLD-MCNC: 9.7 G/DL
IMM GRANULOCYTES # BLD AUTO: 0.05 K/UL
IMM GRANULOCYTES NFR BLD AUTO: 0.7 %
INR PPP: 1
LYMPHOCYTES # BLD AUTO: 1.2 K/UL
LYMPHOCYTES NFR BLD: 17.7 %
MAGNESIUM SERPL-MCNC: 1.5 MG/DL
MCH RBC QN AUTO: 24 PG
MCHC RBC AUTO-ENTMCNC: 31.5 G/DL
MCV RBC AUTO: 76 FL
MONOCYTES # BLD AUTO: 1.3 K/UL
MONOCYTES NFR BLD: 18.8 %
NEUTROPHILS # BLD AUTO: 4.1 K/UL
NEUTROPHILS NFR BLD: 61.5 %
NRBC BLD-RTO: 0 /100 WBC
PHOSPHATE SERPL-MCNC: 2.8 MG/DL
PLATELET # BLD AUTO: 282 K/UL
PMV BLD AUTO: 8.6 FL
POTASSIUM SERPL-SCNC: 3.3 MMOL/L
PROT SERPL-MCNC: 5.3 G/DL
PROTHROMBIN TIME: 10.8 SEC
RBC # BLD AUTO: 4.05 M/UL
SODIUM SERPL-SCNC: 136 MMOL/L
T4 FREE SERPL-MCNC: 1.03 NG/DL
TSH SERPL DL<=0.005 MIU/L-ACNC: 9.3 UIU/ML
WBC # BLD AUTO: 6.74 K/UL

## 2018-03-08 PROCEDURE — 85730 THROMBOPLASTIN TIME PARTIAL: CPT

## 2018-03-08 PROCEDURE — 84439 ASSAY OF FREE THYROXINE: CPT

## 2018-03-08 PROCEDURE — 25500020 PHARM REV CODE 255: Performed by: INTERNAL MEDICINE

## 2018-03-08 PROCEDURE — 83735 ASSAY OF MAGNESIUM: CPT

## 2018-03-08 PROCEDURE — 85610 PROTHROMBIN TIME: CPT

## 2018-03-08 PROCEDURE — 84443 ASSAY THYROID STIM HORMONE: CPT

## 2018-03-08 PROCEDURE — A9585 GADOBUTROL INJECTION: HCPCS | Performed by: INTERNAL MEDICINE

## 2018-03-08 PROCEDURE — 25000003 PHARM REV CODE 250: Performed by: STUDENT IN AN ORGANIZED HEALTH CARE EDUCATION/TRAINING PROGRAM

## 2018-03-08 PROCEDURE — 84100 ASSAY OF PHOSPHORUS: CPT

## 2018-03-08 PROCEDURE — 80053 COMPREHEN METABOLIC PANEL: CPT

## 2018-03-08 PROCEDURE — 20600001 HC STEP DOWN PRIVATE ROOM

## 2018-03-08 PROCEDURE — 83036 HEMOGLOBIN GLYCOSYLATED A1C: CPT

## 2018-03-08 PROCEDURE — 85025 COMPLETE CBC W/AUTO DIFF WBC: CPT

## 2018-03-08 PROCEDURE — 36415 COLL VENOUS BLD VENIPUNCTURE: CPT

## 2018-03-08 PROCEDURE — 99233 SBSQ HOSP IP/OBS HIGH 50: CPT | Mod: ,,, | Performed by: INTERNAL MEDICINE

## 2018-03-08 PROCEDURE — 63600175 PHARM REV CODE 636 W HCPCS: Performed by: STUDENT IN AN ORGANIZED HEALTH CARE EDUCATION/TRAINING PROGRAM

## 2018-03-08 RX ORDER — FENTANYL CITRATE 50 UG/ML
INJECTION, SOLUTION INTRAMUSCULAR; INTRAVENOUS
Status: DISCONTINUED
Start: 2018-03-08 | End: 2018-03-09 | Stop reason: WASHOUT

## 2018-03-08 RX ORDER — FENTANYL CITRATE 50 UG/ML
25 INJECTION, SOLUTION INTRAMUSCULAR; INTRAVENOUS
Status: DISCONTINUED | OUTPATIENT
Start: 2018-03-08 | End: 2018-03-09 | Stop reason: HOSPADM

## 2018-03-08 RX ORDER — GADOBUTROL 604.72 MG/ML
10 INJECTION INTRAVENOUS
Status: COMPLETED | OUTPATIENT
Start: 2018-03-08 | End: 2018-03-08

## 2018-03-08 RX ADMIN — OXYCODONE HYDROCHLORIDE 5 MG: 5 TABLET ORAL at 11:03

## 2018-03-08 RX ADMIN — HEPARIN SODIUM 5000 UNITS: 5000 INJECTION, SOLUTION INTRAVENOUS; SUBCUTANEOUS at 06:03

## 2018-03-08 RX ADMIN — HEPARIN SODIUM 5000 UNITS: 5000 INJECTION, SOLUTION INTRAVENOUS; SUBCUTANEOUS at 01:03

## 2018-03-08 RX ADMIN — HEPARIN SODIUM 5000 UNITS: 5000 INJECTION, SOLUTION INTRAVENOUS; SUBCUTANEOUS at 12:03

## 2018-03-08 RX ADMIN — OXYCODONE HYDROCHLORIDE 5 MG: 5 TABLET ORAL at 01:03

## 2018-03-08 RX ADMIN — LEVOTHYROXINE SODIUM 75 MCG: 75 TABLET ORAL at 06:03

## 2018-03-08 RX ADMIN — SIMETHICONE CHEW TAB 80 MG 80 MG: 80 TABLET ORAL at 08:03

## 2018-03-08 RX ADMIN — OXYCODONE HYDROCHLORIDE 5 MG: 5 TABLET ORAL at 02:03

## 2018-03-08 RX ADMIN — SIMETHICONE CHEW TAB 80 MG 80 MG: 80 TABLET ORAL at 09:03

## 2018-03-08 RX ADMIN — OXYCODONE HYDROCHLORIDE 5 MG: 5 TABLET ORAL at 07:03

## 2018-03-08 RX ADMIN — GADOBUTROL 10 ML: 604.72 INJECTION INTRAVENOUS at 12:03

## 2018-03-08 RX ADMIN — PANTOPRAZOLE SODIUM 40 MG: 40 TABLET, DELAYED RELEASE ORAL at 09:03

## 2018-03-08 RX ADMIN — HEPARIN SODIUM 5000 UNITS: 5000 INJECTION, SOLUTION INTRAVENOUS; SUBCUTANEOUS at 10:03

## 2018-03-08 NOTE — ASSESSMENT & PLAN NOTE
Has RCC with liver metastases that progressed prior to planned liver resection, progressed after gemcitabine and sunitinib, but responded to lenvatinib and everolimus based on recent scans.  With abdominal pain radiating from right to left quadrants, differential includes subcapsular hematoma.  Other ddx include peritoneal carcinomatosis or bowel obstruction (partial or complete) 2/2 mass effect (less likely as he is still having regular bowel movements).    -Will obtain MRI abdomen to evaluate for peritoneal carcinomatosis.  --if he has peritoneal carcinomatosis, his primary oncologist recommended that the next step is hospice.  Patient and his wife understand.  --if he has bowel obstruction, may resolve on its own but if significant, may need venting gastrostomy for palliation.  This possibility was also discussed with the patient and his wife.  -He has had good pain relief with oxycodone overnight  --continue with oxycodone 5mg q4h prn pain, simethicone standing TID to prevent bloating after eating that also triggers his abdominal pain  --iv zofran prn nausea

## 2018-03-08 NOTE — PROGRESS NOTES
Spoke w/ Dr Centeno at this time concerning pts MRI. Dr Centeno stated MRI will be done in the am when staff is here to protocol. Pt informed & is w/out any questions or concerns.

## 2018-03-08 NOTE — NURSING
Patient reports pain to his abdomen, paged the MD about this, he is ordering something for pain and to help keep him calm as needed for the mri

## 2018-03-08 NOTE — PLAN OF CARE
Problem: Patient Care Overview  Goal: Plan of Care Review  Outcome: Ongoing (interventions implemented as appropriate)  Recommendations     1. Continue regular diet regimen.   2. Encourage PO intake >75% EEN, EPN. Recommend Boost Breeze for additional nutrients.   3. RD to monitor and follow-up    Nutrition Problem  Inadequate oral intake     Related to (etiology):   Decrease appetite, nausea      Signs and Symptoms (as evidenced by):   PO intake <25%     Interventions/Recommendations (treatment strategy):  See recs above     Nutrition Diagnosis Status:   New

## 2018-03-08 NOTE — UM SECONDARY REVIEW
Physician Advisor External    Level of Care Issue    Approved Inpatient     Per Dr. Ana Maria Marcos @ EHR, patient is Inpatient appropriate.

## 2018-03-08 NOTE — ASSESSMENT & PLAN NOTE
Takes protonix 40mg qday and famotidine 40mg qhs at home  -c/w protonix 40mg qday and famotidine 40mg qhs

## 2018-03-08 NOTE — NURSING
Patient returned from MRI extremely agitated and upset about how long procedure took. Relaxation and calm environment promoted.  Spouse at bedside.

## 2018-03-08 NOTE — PROGRESS NOTES
[facility]  Adult Nutrition  Progress Note    SUMMARY       Recommendations    1. Continue regular diet regimen.   2. Encourage PO intake >75% EEN, EPN. Recommend Boost Breeze for additional calories and protein.  3. RD to monitor and follow-up.    Goals: PO intake >85% EEN, EPN  Nutrition Goal Status: new  Communication of RD Recs: reviewed with RN    Reason for Assessment    Reason for Assessment: consult  Diagnosis: other (see comments) (Abdominal pain)  Relevant Medical History: Nephrectomy, Chemo, RLQ abd pain    General Information Comments: Family reports history of poor appetite, consuming ~25% PTA and tolerating only liquids. Family reports chronic nausea. Currently, pt is consuming 25% meal intake. Wt loss IQRA due to pt irritation, will follow-up. Family would like to try Boost Breeze for the pt. Pt wt, ht, energy, and protein needs IQRA.     Nutrition Discharge Planning: [facility]    Nutrition Risk Screen    Nutrition Risk Screen: other (see comments) (Poor appetite)    Nutrition/Diet History    Patient Reported Diet/Restrictions/Preferences: general  Food Preferences: No cultural or Christianity preferences  Do you have any cultural, spiritual, Christianity conflicts, given your current situation?: no  Factors Affecting Nutritional Intake: altered taste, abdominal pain, nausea/vomiting, decreased appetite    Lab/Procedures/Meds    Pertinent Labs Reviewed: reviewed  Pertinent Labs Comments: K 3.3, Cr 1.5, GFR 47.5, glu 116, Ca 8.5, Mg 1.5, Alk Phos 273, Alb 1.9, AST 65, A1C 6.2   Pertinent Medications Reviewed: reviewed  Pertinent Medications Comments: Heparin, ordansetron, senna    Physical Findings/Assessment    Overall Physical Appearance: nourished  Oral/Mouth Cavity: WDL  Skin: edema    Estimated/Assessed Needs    Energy Calorie Requirements (kcal): IQRA  Protein Requirements: IQRA  RDA Method (mL): IQRA    Nutrition Prescription Ordered    Current Diet Order: Regular     Evaluation of Received  Nutrient/Fluid Intake    I/O: +I/O  Comments: LBM 3/7  % Intake of Estimated Energy Needs: 0 - 25 %  % Meal Intake: 0 - 25 %    Nutrition Risk    Level of Risk/Frequency of Follow-up: low (2x/week)     Recommendations    Recommendation/Intervention: 1. Continue regular diet regimen. 2. Encourage PO intake >75% EEN, EPN. Recommend Boost Breeze for additional nutrients. 3. RD to monitor and follow-up.  Goals: PO intake >85% EEN, EPN  Nutrition Goal Status: new  Communication of RD Recs: reviewed with RN    Assessment and Plan    Nutrition Problem  Inadequate oral intake    Related to (etiology):   Decrease appetite, nausea     Signs and Symptoms (as evidenced by):   PO intake <25%    Interventions/Recommendations (treatment strategy):  See recs above    Nutrition Diagnosis Status:   New    Monitor and Eval    Food and Nutrient Intake: energy intake, food and beverage intake  Food and Nutrient Adminstration: diet order  Knowledge/Beliefs/Attitudes: beliefs and attitudes, food and nutrition knowledge/skill  Physical Activity and Function: nutrition-related ADLs and IADLs  Anthropometric Measurements: height/length, weight, weight change, body mass index  Biochemical Data, Medical Tests and Procedures: electrolyte and renal panel, gastrointestinal profile, glucose/endocrine profile, inflammatory profile, lipid profile  Nutrition-Focused Physical Findings: overall appearance     Nutrition Follow-Up    RD Follow-up?: Yes

## 2018-03-08 NOTE — PROGRESS NOTES
[facility]  Hematology/Oncology  Progress Note    Patient Name: Bud Russo III  Admission Date: 3/7/2018  Hospital Length of Stay: 1 days  Code Status: Prior     Subjective:     HPI:  68 yo man with metastatic RCC to liver s/p left nephrectomy, initially received chemoembolization to liver with plans for resection but progressed while awaiting liver resection, progressed after 3 cycles q3wk gemcitabine (completed 9/2017) with sunitinib, and was most recently on lenvatinib and everolimus.  He had stopped his lenvatinib and everolimus recently when he developed nausea, abdominal pain, and low oral intake, which seemed to improve after he stopped it.  He was seen by his primary oncologist who recommended staying off of the medications on his visit 3/5/18 with plans to restart in a few days at a lower dose.  However, unfortunately his symptoms recurred.  Developed intermittent abdominal pain from RLQ radiating across to left side of abdomen.  Associated with bloating and gas.  Relieved with belching.  Not passing flatus but having normal bowel movements still.  Worsened bloating and abdominal pain approximately 3-4 hours after eating.  Denies fevers, chills, diarrhea.  Had one episode of nausea and vomiting while at radiation oncology when he sipped some water.    Denies using any pain medications. Has used simethicone intermittently to assist with alleviating symptoms.  No worsening swelling. Low appetite due to bloating and abdominal pain.    Interval History: Pain well controlled overnight with oxycodone.  Continues to pass gas.  Denies nausea, vomiting, diarrhea, fevers, chills, cough, or shortness of breath.  MRI was cancelled last night and was not done.      Oncology Treatment Plan:   OP BREAST GEMCITABINE QW    Medications:  Continuous Infusions:  Scheduled Meds:   heparin (porcine)  5,000 Units Subcutaneous Q8H    levothyroxine  75 mcg Oral Before breakfast    pantoprazole  40 mg Oral  Daily    senna  8.6 mg Oral Daily    simethicone  1 tablet Oral TID     PRN Meds:dextrose 50%, dextrose 50%, glucagon (human recombinant), glucose, glucose, ondansetron, oxyCODONE, polyethylene glycol, sodium chloride 0.9%     Review of Systems   Constitutional: Positive for appetite change and fatigue. Negative for chills and fever.   HENT: Negative for nosebleeds.    Eyes: Negative for visual disturbance.   Respiratory: Negative for cough and shortness of breath.    Cardiovascular: Negative for chest pain and leg swelling.   Gastrointestinal: Positive for abdominal pain and nausea. Negative for constipation, diarrhea and vomiting.   Endocrine: Negative for polyuria.   Genitourinary: Negative for dysuria and hematuria.   Musculoskeletal: Negative for arthralgias.   Skin: Negative for color change.   Neurological: Positive for weakness. Negative for dizziness, syncope and light-headedness.   Hematological: Negative for adenopathy.   Psychiatric/Behavioral: Negative for confusion.     Objective:     Vital Signs (Most Recent):  Temp: 98.1 °F (36.7 °C) (03/08/18 0913)  Pulse: 97 (03/08/18 0913)  Resp: 18 (03/08/18 0913)  BP: 122/77 (03/08/18 0913)  SpO2: (!) 94 % (03/08/18 0913) Vital Signs (24h Range):  Temp:  [97.9 °F (36.6 °C)-99.2 °F (37.3 °C)] 98.1 °F (36.7 °C)  Pulse:  [90-97] 97  Resp:  [16-20] 18  SpO2:  [92 %-97 %] 94 %  BP: (104-127)/(62-77) 122/77        There is no height or weight on file to calculate BMI.  There is no height or weight on file to calculate BSA.      Intake/Output Summary (Last 24 hours) at 03/08/18 1110  Last data filed at 03/08/18 0600   Gross per 24 hour   Intake               80 ml   Output                0 ml   Net               80 ml       Physical Exam   Constitutional: He appears well-developed.   HENT:   Head: Normocephalic.   Eyes: EOM are normal. Pupils are equal, round, and reactive to light. No scleral icterus.   Neck: Normal range of motion. No tracheal deviation present.    Cardiovascular: Regular rhythm and normal heart sounds.  Exam reveals no gallop and no friction rub.    No murmur heard.  tachycardic   Pulmonary/Chest: Effort normal and breath sounds normal. No respiratory distress. He has no wheezes. He has no rales.   Abdominal: Soft. Bowel sounds are normal. He exhibits no distension and no mass. There is tenderness (RUQ). There is guarding.   Musculoskeletal: He exhibits edema (1+ pitting edema ble ).   Lymphadenopathy:     He has no cervical adenopathy.   Neurological: He is alert.   Skin: Skin is warm and dry.   Psychiatric: He has a normal mood and affect.       Significant Labs:   CBC:   Recent Labs  Lab 03/07/18  1142 03/08/18  0339   WBC 9.00 6.74   HGB 10.7* 9.7*   HCT 34.7* 30.8*    282    and CMP:   Recent Labs  Lab 03/07/18  1142 03/08/18  0339   * 136   K 3.6 3.3*    102   CO2 23 21*   * 116*   BUN 16 14   CREATININE 1.9* 1.5*   CALCIUM 9.1 8.5*   PROT 6.1 5.3*   ALBUMIN 2.1* 1.9*   BILITOT 0.6 0.5   ALKPHOS 333* 273*   AST 78* 65*   ALT 26 19   ANIONGAP 12 13   EGFRNONAA 35.7* 47.5*       Diagnostic Results:  None    Assessment/Plan:     Liver metastases    Has RCC with liver metastases that progressed prior to planned liver resection, progressed after gemcitabine and sunitinib, but responded to lenvatinib and everolimus based on recent scans.  With abdominal pain radiating from right to left quadrants, differential includes subcapsular hematoma.  Other ddx include peritoneal carcinomatosis or bowel obstruction (partial or complete) 2/2 mass effect (less likely as he is still having regular bowel movements).    -Will obtain MRI abdomen to evaluate for peritoneal carcinomatosis.  --if he has peritoneal carcinomatosis, his primary oncologist recommended that the next step is hospice.  Patient and his wife understand.  --if he has bowel obstruction, may resolve on its own but if significant, may need venting gastrostomy for palliation.   This possibility was also discussed with the patient and his wife.  -He has had good pain relief with oxycodone overnight  --continue with oxycodone 5mg q4h prn pain, simethicone standing TID to prevent bloating after eating that also triggers his abdominal pain  --iv zofran prn nausea        HTN (hypertension)    HTN on verapamil 180mg qday and lasix 40mg qday at home.  BP currently adequately controlled.  Only mild peripheral edema BLE.  -with recent decline in oral intake, will hold both medications.  Can restart at lower dose if needed           Gastroesophageal reflux disease without esophagitis    Takes protonix 40mg qday and famotidine 40mg qhs at home  -c/w protonix 40mg qday and famotidine 40mg qhs        Constipation    Concern for developing constipation while receiving pain medications.  -start senna/colace qday  -miralax PRN BID        Other specified hypothyroidism    Has hypothyroidism on synthroid.  -Continue home synthroid 75mcg qday        Renal cell carcinoma    Metastatic RCC with liver metastases.  Currently holding home everolimus and lenvatinib  -re-initiation of medications to be determined in the outpatient setting.  -see liver metastases                 Kemal Vernon MD  Hematology/Oncology  [facility]      ATTENDING NOTE, ONCOLOGY INPATIENT TEAM    As above; events of last 24 hours noted.  Patient seen and examined, chart reviewed.  Appears more comfortable today, in NAD, but still complaining of poor appetite and nausea..  Lungs are clear to auscultation.  Abdomen is soft, nontender.  Labs reviewed.  MRI reviewed with Dr. Beckett.    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  I explained to him that he has peritoneal carcinomatosis.  I offered third line treatment with nivolumab, and also explained to him that his window of opportunity for treatment will close soon.  Dr. Russo would like to think about his options.  Will reassess in  am.  His multiple questions were answered to his satisfaction.      Adilson Stock MD

## 2018-03-08 NOTE — ASSESSMENT & PLAN NOTE
Has RCC with liver metastases that progressed prior to planned liver resection, progressed after gemcitabine and sunitinib, but responded to lenvatinib and everolimus based on recent scans.  With abdominal pain radiating from right to left quadrants, differential includes subcapsular hematoma.  Other ddx include peritoneal carcinomatosis or bowel obstruction (partial or complete) 2/2 mass effect (less likely as he is still having regular bowel movements).    -Will obtain MRI abdomen to evaluate for peritoneal carcinomatosis.  --if he has peritoneal carcinomatosis, his primary oncologist recommended that the next step is hospice.  Patient and his wife understand.  --if he has bowel obstruction, may resolve on its own but if significant, may need venting gastrostomy for palliation.  This possibility was also discussed with the patient and his wife.  -He has not tried any pain medications  --start oxycodone 5mg q4h prn pain, simethicone standing TID to prevent bloating after eating that also triggers his abdominal pain  --iv zofran prn nausea

## 2018-03-08 NOTE — H&P
[facility]  Hematology/Oncology  H&P    Patient Name: Bud Russo III  MRN: [id number]  Admission Date: 3/7/2018  Code Status: Prior   Attending Provider: Sekou Barrios DO, F*  Primary Care Physician: Johny Patel MD  Principal Problem:<principal problem not specified>    Subjective:     HPI: 68 yo man with metastatic RCC to liver s/p left nephrectomy, initially received chemoembolization to liver with plans for resection but progressed while awaiting liver resection, progressed after 3 cycles q3wk gemcitabine (completed 9/2017) with sunitinib, and was most recently on lenvatinib and everolimus.  He had stopped his lenvatinib and everolimus recently when he developed nausea, abdominal pain, and low oral intake, which seemed to improve after he stopped it.  He was seen by his primary oncologist who recommended staying off of the medications on his visit 3/5/18 with plans to restart in a few days at a lower dose.  However, unfortunately his symptoms recurred.  Developed intermittent abdominal pain from RLQ radiating across to left side of abdomen.  Associated with bloating and gas.  Relieved with belching.  Not passing flatus but having normal bowel movements still.  Worsened bloating and abdominal pain approximately 3-4 hours after eating.  Denies fevers, chills, diarrhea.      Denies using any pain medications. Has used simethicone intermittently to assist with alleviating symptoms.  No worsening swelling. Low appetite due to bloating and abdominal pain.     Oncology Treatment Plan:   OP BREAST GEMCITABINE QW    Medications:  Continuous Infusions:  Scheduled Meds:  PRN Meds:     Review of patient's allergies indicates:   Allergen Reactions    Adhesive tape-silicones      Other reaction(s): Rash    Sulfa (sulfonamide antibiotics)      Other reaction(s): Hives        Past Medical History:   Diagnosis Date    Hypertension     Ventricular tachycardia     frequent PVCs c/w outflow  tract etiology     No past surgical history on file.  Family History     None        Social History Main Topics    Smoking status: Never Smoker    Smokeless tobacco: Never Used    Alcohol use Yes    Drug use: Unknown    Sexual activity: Not on file       Review of Systems   Constitutional: Positive for appetite change and fatigue. Negative for chills and fever.   HENT: Negative for nosebleeds.    Eyes: Negative for visual disturbance.   Respiratory: Negative for cough and shortness of breath.    Cardiovascular: Negative for chest pain and leg swelling.   Gastrointestinal: Positive for abdominal pain and nausea. Negative for constipation, diarrhea and vomiting.   Endocrine: Negative for polyuria.   Genitourinary: Negative for dysuria and hematuria.   Musculoskeletal: Negative for arthralgias.   Skin: Negative for color change.   Neurological: Positive for weakness. Negative for dizziness, syncope and light-headedness.   Hematological: Negative for adenopathy.   Psychiatric/Behavioral: Negative for confusion.     Objective:     Vital Signs (Most Recent):    Vital Signs (24h Range):  Temp:  [97.9 °F (36.6 °C)] 97.9 °F (36.6 °C)  Pulse:  [94] 94  Resp:  [18] 18  BP: (127)/(75) 127/75        There is no height or weight on file to calculate BMI.  There is no height or weight on file to calculate BSA.    No intake or output data in the 24 hours ending 03/07/18 1632    Physical Exam   Constitutional: He appears well-developed.   HENT:   Head: Normocephalic.   Eyes: EOM are normal. Pupils are equal, round, and reactive to light. No scleral icterus.   Neck: Normal range of motion. No tracheal deviation present.   Cardiovascular: Regular rhythm and normal heart sounds.  Exam reveals no gallop and no friction rub.    No murmur heard.  tachycardic   Pulmonary/Chest: Effort normal and breath sounds normal. No respiratory distress. He has no wheezes. He has no rales.   Abdominal: Soft. Bowel sounds are normal. He exhibits no  distension and no mass. There is tenderness (RUQ). There is guarding.   Musculoskeletal: He exhibits edema (1+ pitting edema ble ).   Lymphadenopathy:     He has no cervical adenopathy.   Neurological: He is alert.   Skin: Skin is warm and dry.   Psychiatric: He has a normal mood and affect.       Significant Labs:   CBC:   Recent Labs  Lab 03/07/18  1142   WBC 9.00   HGB 10.7*   HCT 34.7*       and CMP:   Recent Labs  Lab 03/07/18  1142   *   K 3.6      CO2 23   *   BUN 16   CREATININE 1.9*   CALCIUM 9.1   PROT 6.1   ALBUMIN 2.1*   BILITOT 0.6   ALKPHOS 333*   AST 78*   ALT 26   ANIONGAP 12   EGFRNONAA 35.7*       Diagnostic Results:  None    Assessment/Plan:     Liver metastases    Has RCC with liver metastases that progressed prior to planned liver resection, progressed after gemcitabine and sunitinib, but responded to lenvatinib and everolimus based on recent scans.  With abdominal pain radiating from right to left quadrants, differential includes subcapsular hematoma.  Other ddx include peritoneal carcinomatosis or bowel obstruction (partial or complete) 2/2 mass effect (less likely as he is still having regular bowel movements).    -Will obtain MRI abdomen to evaluate for peritoneal carcinomatosis.  --if he has peritoneal carcinomatosis, his primary oncologist recommended that the next step is hospice.  Patient and his wife understand.  --if he has bowel obstruction, may resolve on its own but if significant, may need venting gastrostomy for palliation.  This possibility was also discussed with the patient and his wife.  -He has not tried any pain medications  --start oxycodone 5mg q4h prn pain, simethicone standing TID to prevent bloating after eating that also triggers his abdominal pain  --iv zofran prn nausea        HTN (hypertension)    HTN on verapamil 180mg qday and lasix 40mg qday at home.  BP currently adequately controlled.  Only mild peripheral edema BLE.  -with recent  decline in oral intake, will hold both medications.  Can restart at lower dose if needed           Constipation    Concern for developing constipation while receiving pain medications.  -start senna/colace qday  -miralax PRN BID        Other specified hypothyroidism    Has hypothyroidism on synthroid.  -Continue home synthroid 75mcg qday        Renal cell carcinoma    Metastatic RCC with liver metastases.  Currently holding home everolimus and lenvatinib  -re-initiation of medications to be determined in the outpatient setting.  -see liver metastases            Kemal Vernon MD  Hematology/Oncology  [facility]      ATTENDING NOTE, ONCOLOGY INPATIENT TEAM    As above;   Patient seen and examined in the infusion suite; he is well known to me.  He appears uncomfortable, compalining of weakness, malaise, and intermittent abdominal pain  Lungs are clear to auscultation.  Abdomen is soft, nontender.  Labs reviewed.    PLAN  Admit for further workup.  We will obtain an abdominal MRI; I suspect he has peritoneal metastases and I have told him so.  His multiple questions were answered to his satisfaction.      Adilson Stock MD

## 2018-03-08 NOTE — PLAN OF CARE
Problem: Patient Care Overview  Goal: Plan of Care Review  Outcome: Ongoing (interventions implemented as appropriate)  No acute events. Patient AAOx4 resting in bed, calm and in no distress.  AVSS.  Standard precautions maintained and patient remains afebrile.  Patient underwent MRI today.  Pain moderately controlled with PRN oxy.  Remained free of falls.  Siderails up x2, call light in reach.  WCTM.

## 2018-03-08 NOTE — SUBJECTIVE & OBJECTIVE
Interval History: Pain well controlled overnight with oxycodone.  Continues to pass gas.  Denies nausea, vomiting, diarrhea, fevers, chills, cough, or shortness of breath.  MRI was cancelled last night and was not done.      Oncology Treatment Plan:   OP BREAST GEMCITABINE QW    Medications:  Continuous Infusions:  Scheduled Meds:   heparin (porcine)  5,000 Units Subcutaneous Q8H    levothyroxine  75 mcg Oral Before breakfast    pantoprazole  40 mg Oral Daily    senna  8.6 mg Oral Daily    simethicone  1 tablet Oral TID     PRN Meds:dextrose 50%, dextrose 50%, glucagon (human recombinant), glucose, glucose, ondansetron, oxyCODONE, polyethylene glycol, sodium chloride 0.9%     Review of Systems   Constitutional: Positive for appetite change and fatigue. Negative for chills and fever.   HENT: Negative for nosebleeds.    Eyes: Negative for visual disturbance.   Respiratory: Negative for cough and shortness of breath.    Cardiovascular: Negative for chest pain and leg swelling.   Gastrointestinal: Positive for abdominal pain and nausea. Negative for constipation, diarrhea and vomiting.   Endocrine: Negative for polyuria.   Genitourinary: Negative for dysuria and hematuria.   Musculoskeletal: Negative for arthralgias.   Skin: Negative for color change.   Neurological: Positive for weakness. Negative for dizziness, syncope and light-headedness.   Hematological: Negative for adenopathy.   Psychiatric/Behavioral: Negative for confusion.     Objective:     Vital Signs (Most Recent):  Temp: 98.1 °F (36.7 °C) (03/08/18 0913)  Pulse: 97 (03/08/18 0913)  Resp: 18 (03/08/18 0913)  BP: 122/77 (03/08/18 0913)  SpO2: (!) 94 % (03/08/18 0913) Vital Signs (24h Range):  Temp:  [97.9 °F (36.6 °C)-99.2 °F (37.3 °C)] 98.1 °F (36.7 °C)  Pulse:  [90-97] 97  Resp:  [16-20] 18  SpO2:  [92 %-97 %] 94 %  BP: (104-127)/(62-77) 122/77        There is no height or weight on file to calculate BMI.  There is no height or weight on file to  calculate BSA.      Intake/Output Summary (Last 24 hours) at 03/08/18 1110  Last data filed at 03/08/18 0600   Gross per 24 hour   Intake               80 ml   Output                0 ml   Net               80 ml       Physical Exam   Constitutional: He appears well-developed.   HENT:   Head: Normocephalic.   Eyes: EOM are normal. Pupils are equal, round, and reactive to light. No scleral icterus.   Neck: Normal range of motion. No tracheal deviation present.   Cardiovascular: Regular rhythm and normal heart sounds.  Exam reveals no gallop and no friction rub.    No murmur heard.  tachycardic   Pulmonary/Chest: Effort normal and breath sounds normal. No respiratory distress. He has no wheezes. He has no rales.   Abdominal: Soft. Bowel sounds are normal. He exhibits no distension and no mass. There is tenderness (RUQ). There is guarding.   Musculoskeletal: He exhibits edema (1+ pitting edema ble ).   Lymphadenopathy:     He has no cervical adenopathy.   Neurological: He is alert.   Skin: Skin is warm and dry.   Psychiatric: He has a normal mood and affect.       Significant Labs:   CBC:   Recent Labs  Lab 03/07/18  1142 03/08/18  0339   WBC 9.00 6.74   HGB 10.7* 9.7*   HCT 34.7* 30.8*    282    and CMP:   Recent Labs  Lab 03/07/18  1142 03/08/18  0339   * 136   K 3.6 3.3*    102   CO2 23 21*   * 116*   BUN 16 14   CREATININE 1.9* 1.5*   CALCIUM 9.1 8.5*   PROT 6.1 5.3*   ALBUMIN 2.1* 1.9*   BILITOT 0.6 0.5   ALKPHOS 333* 273*   AST 78* 65*   ALT 26 19   ANIONGAP 12 13   EGFRNONAA 35.7* 47.5*       Diagnostic Results:  None

## 2018-03-08 NOTE — ASSESSMENT & PLAN NOTE
Concern for developing constipation while receiving pain medications.  -start senna/colace qday  -miralax PRN BID

## 2018-03-08 NOTE — ASSESSMENT & PLAN NOTE
HTN on verapamil 180mg qday and lasix 40mg qday at home.  BP currently adequately controlled.  Only mild peripheral edema BLE.  -with recent decline in oral intake, will hold both medications.  Can restart at lower dose if needed

## 2018-03-08 NOTE — PLAN OF CARE
Problem: Patient Care Overview  Goal: Plan of Care Review  Outcome: Ongoing (interventions implemented as appropriate)  Dr Russo AAO x 4 & ambulates independently. Pt remains free from falls. Pt c/o pain x 1 & pain relieved by oxycodone. Pts wife at bedside. Pt due to have MRI today.

## 2018-03-08 NOTE — CONSULTS
"  [facility]  Adult Nutrition  Consult Note    SUMMARY     Recommendations    1. Continue regular diet regimen.   2. Encourage PO intake >75% EEN, EPN. Recommend Boost Breeze for additional nutrients.   3. RD to monitor and follow-up.    Goals: PO intake >85% EEN, EPN  Nutrition Goal Status: new  Communication of RD Recs: reviewed with RN    Reason for Assessment    Reason for Assessment: consult  Diagnosis: other (see comments) (Abdominal pain)  Relevant Medical History: Nephrectomy, Chemo, RLQ abd pain  General Information Comments: Family reports history of poor appetite, consuming ~25% PTA and tolerating only liquids. Family reports chronic nausea. Currently, pt is consuming 25% meal intake. Wt loss IQRA due to pt irritation, will follow-up. Family would like to try Boost Breeze for the pt.   Nutrition Discharge Planning: IQRA    Nutrition Risk Screen    Nutrition Risk Screen: other (see comments) (Poor appetite)    Nutrition/Diet History    Patient Reported Diet/Restrictions/Preferences: general  Food Preferences: No cultural or Protestant preferences  Do you have any cultural, spiritual, Protestant conflicts, given your current situation?: no  Factors Affecting Nutritional Intake: altered taste, abdominal pain, nausea/vomiting, decreased appetite    Anthropometrics    Temp: 98.1 °F (36.7 °C)  Height: 5' 5" (165.1 cm)  Height (inches): 65 in  Weight: 88 kg (194 lb)  Weight (lb): 194 lb  Ideal Body Weight (IBW), Male: 136 lb  % Ideal Body Weight, Male (lb): 142.65 lb  BMI (Calculated): 32.4  BMI Grade: 30 - 34.9- obesity - grade I    Lab/Procedures/Meds    Pertinent Labs Reviewed: reviewed  Pertinent Labs Comments: K 3.3, Cr 1.5, GFR 47.5, glu 116, Ca 8.5, Mg 1.5, Alk Phos 273, Alb 1.9, AST 65, A1C 6.2   Pertinent Medications Reviewed: reviewed  Pertinent Medications Comments: Heparin, ordansetron, senna    Physical Findings/Assessment    Overall Physical Appearance: nourished  Oral/Mouth Cavity: " "WDL  Skin: edema    Estimated/Assessed Needs    Weight Used For Calorie Calculations: 88.2 kg (194 lb 6.4 oz)  Height: 5' 5" (165.1 cm)  Energy Calorie Requirements (kcal): 1978  Energy Need Method: [place]-St Jeor (1.25x PAL)  RMR ([place]-St. Jeor Equation): 1583.68    Protein Requirements: 106-124g/d (1.2-1.4 g/kg)  Weight Used For Protein Calculations: 88.2 kg (194 lb 6.4 oz)    Fluid Need Method: RDA Method (1 ml/kcal or per MD)  RDA Method (mL): 1978     Nutrition Prescription Ordered    Current Diet Order: Regular     Evaluation of Received Nutrient/Fluid Intake    I/O: +I/O  Comments: LBM 3/7  % Intake of Estimated Energy Needs: 25 - 50 %  % Meal Intake: 25 - 50 %    Nutrition Risk    Level of Risk/Frequency of Follow-up: low (2x/week)     Assessment and Plan    Nutrition Problem  Inadequate oral intake     Related to (etiology):   Decrease appetite, nausea      Signs and Symptoms (as evidenced by):   PO intake <25%     Interventions/Recommendations (treatment strategy):  See recs above     Nutrition Diagnosis Status:   New    Monitor and Eval    Food and Nutrient Intake: energy intake, food and beverage intake  Food and Nutrient Adminstration: diet order  Knowledge/Beliefs/Attitudes: beliefs and attitudes, food and nutrition knowledge/skill  Physical Activity and Function: nutrition-related ADLs and IADLs  Anthropometric Measurements: height/length, weight, weight change, body mass index  Biochemical Data, Medical Tests and Procedures: electrolyte and renal panel, gastrointestinal profile, glucose/endocrine profile, inflammatory profile, lipid profile  Nutrition-Focused Physical Findings: overall appearance     Nutrition Follow-Up    RD Follow-up?: Yes        "

## 2018-03-08 NOTE — ASSESSMENT & PLAN NOTE
Metastatic RCC with liver metastases.  Currently holding home everolimus and lenvatinib  -re-initiation of medications to be determined in the outpatient setting.  -see liver metastases

## 2018-03-09 VITALS
HEIGHT: 65 IN | OXYGEN SATURATION: 93 % | BODY MASS INDEX: 32.49 KG/M2 | DIASTOLIC BLOOD PRESSURE: 83 MMHG | RESPIRATION RATE: 16 BRPM | WEIGHT: 195 LBS | TEMPERATURE: 98 F | SYSTOLIC BLOOD PRESSURE: 125 MMHG | HEART RATE: 107 BPM

## 2018-03-09 LAB
ALBUMIN SERPL BCP-MCNC: 2 G/DL
ALP SERPL-CCNC: 273 U/L
ALT SERPL W/O P-5'-P-CCNC: 19 U/L
ANION GAP SERPL CALC-SCNC: 10 MMOL/L
AST SERPL-CCNC: 67 U/L
BASOPHILS # BLD AUTO: 0.02 K/UL
BASOPHILS NFR BLD: 0.2 %
BILIRUB SERPL-MCNC: 0.6 MG/DL
BUN SERPL-MCNC: 13 MG/DL
CALCIUM SERPL-MCNC: 8.9 MG/DL
CHLORIDE SERPL-SCNC: 101 MMOL/L
CO2 SERPL-SCNC: 24 MMOL/L
CREAT SERPL-MCNC: 1.7 MG/DL
DIFFERENTIAL METHOD: ABNORMAL
EOSINOPHIL # BLD AUTO: 0.1 K/UL
EOSINOPHIL NFR BLD: 0.8 %
ERYTHROCYTE [DISTWIDTH] IN BLOOD BY AUTOMATED COUNT: 22.3 %
EST. GFR  (AFRICAN AMERICAN): 47.2 ML/MIN/1.73 M^2
EST. GFR  (NON AFRICAN AMERICAN): 40.8 ML/MIN/1.73 M^2
GLUCOSE SERPL-MCNC: 102 MG/DL
HCT VFR BLD AUTO: 33.8 %
HGB BLD-MCNC: 10.5 G/DL
IMM GRANULOCYTES # BLD AUTO: 0.12 K/UL
IMM GRANULOCYTES NFR BLD AUTO: 1.4 %
LYMPHOCYTES # BLD AUTO: 1.6 K/UL
LYMPHOCYTES NFR BLD: 18.7 %
MAGNESIUM SERPL-MCNC: 1.6 MG/DL
MCH RBC QN AUTO: 24.1 PG
MCHC RBC AUTO-ENTMCNC: 31.1 G/DL
MCV RBC AUTO: 78 FL
MONOCYTES # BLD AUTO: 1.7 K/UL
MONOCYTES NFR BLD: 20.3 %
NEUTROPHILS # BLD AUTO: 4.9 K/UL
NEUTROPHILS NFR BLD: 58.6 %
NRBC BLD-RTO: 0 /100 WBC
PHOSPHATE SERPL-MCNC: 3.2 MG/DL
PLATELET # BLD AUTO: 339 K/UL
PMV BLD AUTO: 9 FL
POTASSIUM SERPL-SCNC: 3.7 MMOL/L
PROT SERPL-MCNC: 5.6 G/DL
RBC # BLD AUTO: 4.35 M/UL
SODIUM SERPL-SCNC: 135 MMOL/L
WBC # BLD AUTO: 8.29 K/UL

## 2018-03-09 PROCEDURE — 99238 HOSP IP/OBS DSCHRG MGMT 30/<: CPT | Mod: ,,, | Performed by: INTERNAL MEDICINE

## 2018-03-09 PROCEDURE — 25000003 PHARM REV CODE 250: Performed by: STUDENT IN AN ORGANIZED HEALTH CARE EDUCATION/TRAINING PROGRAM

## 2018-03-09 PROCEDURE — 84100 ASSAY OF PHOSPHORUS: CPT

## 2018-03-09 PROCEDURE — 85025 COMPLETE CBC W/AUTO DIFF WBC: CPT

## 2018-03-09 PROCEDURE — 83735 ASSAY OF MAGNESIUM: CPT

## 2018-03-09 PROCEDURE — 63600175 PHARM REV CODE 636 W HCPCS: Performed by: STUDENT IN AN ORGANIZED HEALTH CARE EDUCATION/TRAINING PROGRAM

## 2018-03-09 PROCEDURE — 36415 COLL VENOUS BLD VENIPUNCTURE: CPT

## 2018-03-09 PROCEDURE — 80053 COMPREHEN METABOLIC PANEL: CPT

## 2018-03-09 RX ORDER — HYDROXYZINE HYDROCHLORIDE 25 MG/1
25 TABLET, FILM COATED ORAL 3 TIMES DAILY PRN
Qty: 30 TABLET | Refills: 0 | Status: SHIPPED | OUTPATIENT
Start: 2018-03-09 | End: 2018-08-07

## 2018-03-09 RX ORDER — HYDROXYZINE HYDROCHLORIDE 25 MG/1
25 TABLET, FILM COATED ORAL 3 TIMES DAILY PRN
Status: DISCONTINUED | OUTPATIENT
Start: 2018-03-09 | End: 2018-03-09 | Stop reason: HOSPADM

## 2018-03-09 RX ORDER — SENNOSIDES 8.6 MG/1
1 TABLET ORAL DAILY
COMMUNITY
Start: 2018-03-10 | End: 2021-02-26

## 2018-03-09 RX ORDER — MORPHINE SULFATE 15 MG/1
15 TABLET, FILM COATED, EXTENDED RELEASE ORAL EVERY 12 HOURS
Status: DISCONTINUED | OUTPATIENT
Start: 2018-03-09 | End: 2018-03-09

## 2018-03-09 RX ORDER — OXYCODONE HYDROCHLORIDE 5 MG/1
5 TABLET ORAL
Qty: 120 TABLET | Refills: 0 | Status: CANCELLED | OUTPATIENT
Start: 2018-03-09

## 2018-03-09 RX ORDER — OXYCODONE HYDROCHLORIDE 5 MG/1
5 TABLET ORAL
Qty: 120 TABLET | Refills: 0 | Status: SHIPPED | OUTPATIENT
Start: 2018-03-09 | End: 2018-03-22 | Stop reason: SDUPTHER

## 2018-03-09 RX ORDER — POLYETHYLENE GLYCOL 3350 17 G/17G
17 POWDER, FOR SOLUTION ORAL 2 TIMES DAILY PRN
Qty: 30 EACH | Refills: 1 | Status: SHIPPED | OUTPATIENT
Start: 2018-03-09 | End: 2018-08-07

## 2018-03-09 RX ORDER — SIMETHICONE 80 MG
80 TABLET,CHEWABLE ORAL 3 TIMES DAILY
Refills: 0 | COMMUNITY
Start: 2018-03-09

## 2018-03-09 RX ORDER — OXYCODONE HYDROCHLORIDE 5 MG/1
5 TABLET ORAL
Status: DISCONTINUED | OUTPATIENT
Start: 2018-03-09 | End: 2018-03-09 | Stop reason: HOSPADM

## 2018-03-09 RX ADMIN — OXYCODONE HYDROCHLORIDE 5 MG: 5 TABLET ORAL at 07:03

## 2018-03-09 RX ADMIN — SENNOSIDES 8.6 MG: 8.6 TABLET, FILM COATED ORAL at 09:03

## 2018-03-09 RX ADMIN — HEPARIN SODIUM 5000 UNITS: 5000 INJECTION, SOLUTION INTRAVENOUS; SUBCUTANEOUS at 05:03

## 2018-03-09 RX ADMIN — LEVOTHYROXINE SODIUM 75 MCG: 75 TABLET ORAL at 05:03

## 2018-03-09 RX ADMIN — SIMETHICONE CHEW TAB 80 MG 80 MG: 80 TABLET ORAL at 09:03

## 2018-03-09 RX ADMIN — OXYCODONE HYDROCHLORIDE 5 MG: 5 TABLET ORAL at 03:03

## 2018-03-09 RX ADMIN — PANTOPRAZOLE SODIUM 40 MG: 40 TABLET, DELAYED RELEASE ORAL at 09:03

## 2018-03-09 RX ADMIN — OXYCODONE HYDROCHLORIDE 5 MG: 5 TABLET ORAL at 12:03

## 2018-03-09 NOTE — NURSING
Discharge teaching provided to patient and his wife. Both verbalize understanding of instructions. All new prescriptions at bedside.

## 2018-03-09 NOTE — ASSESSMENT & PLAN NOTE
Concern for developing constipation while receiving pain medications.  -continue with senna/colace qday  -miralax PRN BID

## 2018-03-09 NOTE — ASSESSMENT & PLAN NOTE
Has RCC with liver metastases that progressed prior to planned liver resection, progressed after gemcitabine and sunitinib, but responded to lenvatinib and everolimus based on recent scans.  With abdominal pain radiating from right to left quadrants, differential includes subcapsular hematoma.  Other ddx include peritoneal carcinomatosis or bowel obstruction (partial or complete) 2/2 mass effect (less likely as he is still having regular bowel movements).    -3/8/18 MRI abdomen with omental enhancement suggestive of peritoneal carcinomatosis.  -He has had good pain relief with oxycodone but pain relief is not lasting long enough  --start ms contin 15 mg bid  --continue with oxycodone 5mg q4h prn pain, simethicone standing TID to prevent bloating after eating that also triggers his abdominal pain  --iv zofran prn nausea  --consider starting periactin or megace for appetite stimulation

## 2018-03-09 NOTE — ASSESSMENT & PLAN NOTE
Metastatic RCC with liver metastases.  Currently holding home everolimus and lenvatinib  -re-initiation of medications vs starting another line of therapy to be determined  -see liver metastases

## 2018-03-09 NOTE — ASSESSMENT & PLAN NOTE
HTN on verapamil 180mg qday and lasix 40mg qday at home.  BP currently adequately controlled without verapamil.  Only mild peripheral edema BLE.  -with recent decline in oral intake, will hold both medications.  Can restart at lower dose if needed

## 2018-03-09 NOTE — SUBJECTIVE & OBJECTIVE
Interval History: Gas and bloating resolved with standing simethicone.  Pain worsened with peak 8 but is taken down to 0 with oxycodone.  However it is not lasting as long as he would like.  Wife is also concerned about his appetite.  His oral intake with fluids is good though.  Denies fevers, chills, or shortness of breath.    Oncology Treatment Plan:   OP NSCLC NIVOLUMAB    Medications:  Continuous Infusions:  Scheduled Meds:   heparin (porcine)  5,000 Units Subcutaneous Q8H    levothyroxine  75 mcg Oral Before breakfast    pantoprazole  40 mg Oral Daily    senna  8.6 mg Oral Daily    simethicone  1 tablet Oral TID     PRN Meds:dextrose 50%, dextrose 50%, fentaNYL, glucagon (human recombinant), glucose, glucose, ondansetron, oxyCODONE, polyethylene glycol, sodium chloride 0.9%     Review of Systems   Constitutional: Positive for appetite change and fatigue. Negative for chills and fever.   HENT: Negative for nosebleeds.    Eyes: Negative for visual disturbance.   Respiratory: Negative for cough and shortness of breath.    Cardiovascular: Negative for chest pain and leg swelling.   Gastrointestinal: Positive for abdominal pain and nausea. Negative for constipation, diarrhea and vomiting.   Endocrine: Negative for polyuria.   Genitourinary: Negative for dysuria and hematuria.   Musculoskeletal: Negative for arthralgias.   Skin: Negative for color change.   Neurological: Positive for weakness. Negative for dizziness, syncope and light-headedness.   Hematological: Negative for adenopathy.   Psychiatric/Behavioral: Negative for confusion.     Objective:     Vital Signs (Most Recent):  Temp: 97.6 °F (36.4 °C) (03/08/18 1935)  Pulse: 104 (03/08/18 1935)  Resp: 16 (03/08/18 1935)  BP: 134/81 (03/08/18 1935)  SpO2: (!) 94 % (03/08/18 2000) Vital Signs (24h Range):  Temp:  [97.6 °F (36.4 °C)-98.1 °F (36.7 °C)] 97.6 °F (36.4 °C)  Pulse:  [] 104  Resp:  [16-20] 16  SpO2:  [91 %-94 %] 94 %  BP: (122-144)/(64-81)  134/81     Weight: 88.5 kg (195 lb)  Body mass index is 32.45 kg/m².  Body surface area is 2.01 meters squared.      Intake/Output Summary (Last 24 hours) at 03/09/18 0830  Last data filed at 03/08/18 1800   Gross per 24 hour   Intake              240 ml   Output                0 ml   Net              240 ml       Physical Exam   Constitutional: He appears well-developed.   HENT:   Head: Normocephalic.   Eyes: EOM are normal. Pupils are equal, round, and reactive to light. No scleral icterus.   Neck: Normal range of motion. No tracheal deviation present.   Cardiovascular: Regular rhythm and normal heart sounds.  Exam reveals no gallop and no friction rub.    No murmur heard.  tachycardic   Pulmonary/Chest: Effort normal and breath sounds normal. No respiratory distress. He has no wheezes. He has no rales.   Abdominal: Soft. Bowel sounds are normal. He exhibits no distension and no mass. There is tenderness (RUQ, improved). There is no guarding.   Musculoskeletal: He exhibits edema (1+ pitting edema ble ).   Lymphadenopathy:     He has no cervical adenopathy.   Neurological: He is alert.   Skin: Skin is warm and dry.   Psychiatric: He has a normal mood and affect.       Significant Labs:   CBC:   Recent Labs  Lab 03/07/18  1142 03/08/18  0339 03/09/18  0343   WBC 9.00 6.74 8.29   HGB 10.7* 9.7* 10.5*   HCT 34.7* 30.8* 33.8*    282 339    and CMP:   Recent Labs  Lab 03/07/18  1142 03/08/18  0339 03/09/18  0343   * 136 135*   K 3.6 3.3* 3.7    102 101   CO2 23 21* 24   * 116* 102   BUN 16 14 13   CREATININE 1.9* 1.5* 1.7*   CALCIUM 9.1 8.5* 8.9   PROT 6.1 5.3* 5.6*   ALBUMIN 2.1* 1.9* 2.0*   BILITOT 0.6 0.5 0.6   ALKPHOS 333* 273* 273*   AST 78* 65* 67*   ALT 26 19 19   ANIONGAP 12 13 10   EGFRNONAA 35.7* 47.5* 40.8*       Diagnostic Results:  I have reviewed all pertinent imaging results/findings within the past 24 hours.   MRI abdomen 3/8/18  1. In this patient with history of RCC status  post left nephrectomy, numerous hepatic metastatic lesions appear similar to prior study.  Index lesion is slightly enlarged. Additionally, there has been interval development of ascites and omental enhancement suggestive of peritoneal carcinomatosis.    2. Small pleural effusions.

## 2018-03-09 NOTE — PLAN OF CARE
Problem: Patient Care Overview  Goal: Plan of Care Review  Outcome: Ongoing (interventions implemented as appropriate)  Pt AAOx4, afebrile, free of falls. Pt's spouse at bedside overnight. Pt awaiting discussion with physician regarding MRI results. Pt verbalizes some anxiety given current diagnosis. Pt c/o pain overnight, managed well with PRN regimen. Pt refuses both 0000 and 0400 VS wishing for rest periods instead. No distress noted. No acute events overnight, WCTM.

## 2018-03-09 NOTE — PROGRESS NOTES
[facility]  Hematology/Oncology  Progress Note    Patient Name: Bud Russo III  Admission Date: 3/7/2018  Hospital Length of Stay: 2 days  Code Status: Prior     Subjective:     HPI:  66 yo man with metastatic RCC to liver s/p left nephrectomy, initially received chemoembolization to liver with plans for resection but progressed while awaiting liver resection, progressed after 3 cycles q3wk gemcitabine (completed 9/2017) with sunitinib, and was most recently on lenvatinib and everolimus.  He had stopped his lenvatinib and everolimus recently when he developed nausea, abdominal pain, and low oral intake, which seemed to improve after he stopped it.  He was seen by his primary oncologist who recommended staying off of the medications on his visit 3/5/18 with plans to restart in a few days at a lower dose.  However, unfortunately his symptoms recurred.  Developed intermittent abdominal pain from RLQ radiating across to left side of abdomen.  Associated with bloating and gas.  Relieved with belching.  Not passing flatus but having normal bowel movements still.  Worsened bloating and abdominal pain approximately 3-4 hours after eating.  Denies fevers, chills, diarrhea.  Had one episode of nausea and vomiting while at radiation oncology when he sipped some water.    Denies using any pain medications. Has used simethicone intermittently to assist with alleviating symptoms.  No worsening swelling. Low appetite due to bloating and abdominal pain.    Interval History: Gas and bloating resolved with standing simethicone.  Pain worsened with peak 8 but is taken down to 0 with oxycodone.  However it is not lasting as long as he would like.  Wife is also concerned about his appetite.  His oral intake with fluids is good though.  Denies fevers, chills, or shortness of breath.    Oncology Treatment Plan:   OP NSCLC NIVOLUMAB    Medications:  Continuous Infusions:  Scheduled Meds:   heparin (porcine)  5,000  Units Subcutaneous Q8H    levothyroxine  75 mcg Oral Before breakfast    pantoprazole  40 mg Oral Daily    senna  8.6 mg Oral Daily    simethicone  1 tablet Oral TID     PRN Meds:dextrose 50%, dextrose 50%, fentaNYL, glucagon (human recombinant), glucose, glucose, ondansetron, oxyCODONE, polyethylene glycol, sodium chloride 0.9%     Review of Systems   Constitutional: Positive for appetite change and fatigue. Negative for chills and fever.   HENT: Negative for nosebleeds.    Eyes: Negative for visual disturbance.   Respiratory: Negative for cough and shortness of breath.    Cardiovascular: Negative for chest pain and leg swelling.   Gastrointestinal: Positive for abdominal pain and nausea. Negative for constipation, diarrhea and vomiting.   Endocrine: Negative for polyuria.   Genitourinary: Negative for dysuria and hematuria.   Musculoskeletal: Negative for arthralgias.   Skin: Negative for color change.   Neurological: Positive for weakness. Negative for dizziness, syncope and light-headedness.   Hematological: Negative for adenopathy.   Psychiatric/Behavioral: Negative for confusion.     Objective:     Vital Signs (Most Recent):  Temp: 97.6 °F (36.4 °C) (03/08/18 1935)  Pulse: 104 (03/08/18 1935)  Resp: 16 (03/08/18 1935)  BP: 134/81 (03/08/18 1935)  SpO2: (!) 94 % (03/08/18 2000) Vital Signs (24h Range):  Temp:  [97.6 °F (36.4 °C)-98.1 °F (36.7 °C)] 97.6 °F (36.4 °C)  Pulse:  [] 104  Resp:  [16-20] 16  SpO2:  [91 %-94 %] 94 %  BP: (122-144)/(64-81) 134/81     Weight: 88.5 kg (195 lb)  Body mass index is 32.45 kg/m².  Body surface area is 2.01 meters squared.      Intake/Output Summary (Last 24 hours) at 03/09/18 0830  Last data filed at 03/08/18 1800   Gross per 24 hour   Intake              240 ml   Output                0 ml   Net              240 ml       Physical Exam   Constitutional: He appears well-developed.   HENT:   Head: Normocephalic.   Eyes: EOM are normal. Pupils are equal, round, and  reactive to light. No scleral icterus.   Neck: Normal range of motion. No tracheal deviation present.   Cardiovascular: Regular rhythm and normal heart sounds.  Exam reveals no gallop and no friction rub.    No murmur heard.  tachycardic   Pulmonary/Chest: Effort normal and breath sounds normal. No respiratory distress. He has no wheezes. He has no rales.   Abdominal: Soft. Bowel sounds are normal. He exhibits no distension and no mass. There is tenderness (RUQ, improved). There is no guarding.   Musculoskeletal: He exhibits edema (1+ pitting edema ble ).   Lymphadenopathy:     He has no cervical adenopathy.   Neurological: He is alert.   Skin: Skin is warm and dry.   Psychiatric: He has a normal mood and affect.       Significant Labs:   CBC:   Recent Labs  Lab 03/07/18  1142 03/08/18  0339 03/09/18  0343   WBC 9.00 6.74 8.29   HGB 10.7* 9.7* 10.5*   HCT 34.7* 30.8* 33.8*    282 339    and CMP:   Recent Labs  Lab 03/07/18  1142 03/08/18  0339 03/09/18  0343   * 136 135*   K 3.6 3.3* 3.7    102 101   CO2 23 21* 24   * 116* 102   BUN 16 14 13   CREATININE 1.9* 1.5* 1.7*   CALCIUM 9.1 8.5* 8.9   PROT 6.1 5.3* 5.6*   ALBUMIN 2.1* 1.9* 2.0*   BILITOT 0.6 0.5 0.6   ALKPHOS 333* 273* 273*   AST 78* 65* 67*   ALT 26 19 19   ANIONGAP 12 13 10   EGFRNONAA 35.7* 47.5* 40.8*       Diagnostic Results:  I have reviewed all pertinent imaging results/findings within the past 24 hours.   MRI abdomen 3/8/18  1. In this patient with history of RCC status post left nephrectomy, numerous hepatic metastatic lesions appear similar to prior study.  Index lesion is slightly enlarged. Additionally, there has been interval development of ascites and omental enhancement suggestive of peritoneal carcinomatosis.    2. Small pleural effusions.      Assessment/Plan:     Liver metastases    Has RCC with liver metastases that progressed prior to planned liver resection, progressed after gemcitabine and sunitinib, but  responded to lenvatinib and everolimus based on recent scans.  With abdominal pain radiating from right to left quadrants, differential includes subcapsular hematoma.  Other ddx include peritoneal carcinomatosis or bowel obstruction (partial or complete) 2/2 mass effect (less likely as he is still having regular bowel movements).    -3/8/18 MRI abdomen with omental enhancement suggestive of peritoneal carcinomatosis.  -He has had good pain relief with oxycodone but pain relief is not lasting long enough  --start ms contin 15 mg bid  --continue with oxycodone 5mg q4h prn pain, simethicone standing TID to prevent bloating after eating that also triggers his abdominal pain  --iv zofran prn nausea  --consider starting periactin or megace for appetite stimulation        HTN (hypertension)    HTN on verapamil 180mg qday and lasix 40mg qday at home.  BP currently adequately controlled without verapamil.  Only mild peripheral edema BLE.  -with recent decline in oral intake, will hold both medications.  Can restart at lower dose if needed           Gastroesophageal reflux disease without esophagitis    Takes protonix 40mg qday and famotidine 40mg qhs at home  -c/w protonix 40mg qday and famotidine 40mg qhs        Constipation    Concern for developing constipation while receiving pain medications.  -continue with senna/colace qday  -miralax PRN BID        Other specified hypothyroidism    Has hypothyroidism on synthroid.  -Continue home synthroid 75mcg qday        Renal cell carcinoma    Metastatic RCC with liver metastases.  Currently holding home everolimus and lenvatinib  -re-initiation of medications vs starting another line of therapy to be determined  -see liver metastases                 Kemal Vernon MD  Hematology/Oncology  [facility]        ATTENDING NOTE, ONCOLOGY INPATIENT TEAM    As above; events of last 24 hours noted.  Patient seen and examined, chart reviewed.  Appears comfortable, in  NAD.  Lungs are clear to auscultation.  Abdomen is soft, nontender.  Labs reviewed.    PLAN  We will discharge him home today.  He will return to the Clinic next week to start nivolumab.  He will be on oxycodone 5 mg Q 3 hours prn and he will keep a log with his daily requirement.  His multiple questions were answered to his satisfaction.      Adilson Stock MD

## 2018-03-10 NOTE — DISCHARGE SUMMARY
[facility]  Hematology/Oncology  Discharge Summary      Patient Name: Bud Russo III   MRN: [id number]  Admission Date: 3/7/2018  Hospital Length of Stay: 2 days  Discharge Date and Time: 3/9/2018  1:23 PM  Attending Physician: [place] att. 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  He had stopped his lenvatinib and everolimus recently when he developed nausea, abdominal pain, and low oral intake, which seemed to improve after he stopped it.  He was seen by his primary oncologist who recommended staying off of the medications on his visit 3/5/18 with plans to restart in a few days at a lower dose.  However, unfortunately his symptoms recurred.  Developed intermittent abdominal pain from RLQ radiating across to left side of abdomen.  Associated with bloating and gas.  Relieved with belching.  Not passing flatus but having normal bowel movements still.  Worsened bloating and abdominal pain approximately 3-4 hours after eating.  Denies fevers, chills, diarrhea.  Had one episode of nausea and vomiting while at radiation oncology when he sipped some water.    Denies using any pain medications. Has used simethicone intermittently to assist with alleviating symptoms.  No worsening swelling. Low appetite due to bloating and abdominal pain.    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  He presented with uncontrolled abdominal pain, gas, and bloating, and further workup with MRI revealed progression of disease with peritoneal carcinomatosis.  He was started on standing simethicone and as needed oxycodone for symptom control.  Symptom control was achieved and he was discharged with instructions to follow up with his primary oncologist to potentially start immunotherapy as an outpatient.    Consults:   Consults         Status Ordering Provider     Inpatient consult to Registered Dietitian/Nutritionist  Once     Provider:  (Not yet assigned)    CARMELA Angel          Significant Diagnostic Studies: Radiology: MRI: 3/8/18  Findings:    Small bilateral pleural effusions.  Lung bases are otherwise unremarkable. The stomach, pancreas, spleen and bilateral adrenal glands within normal limits.  There is a small hiatal hernia.      Status post left nephrectomy.  The right kidney demonstrates a T2 hyperintense and T1 hypointense lesion measuring 5.2 x 5.7 x 7.7 cm, likely representing a cyst.      Innumerable hepatic lesions demonstrating restricted diffusion and predominantly peripheral postcontrast enhancement are noted; prior index lesion measures 6.8 x 5.9 cm on today's examination (6.3 x 5.8 cm previously).      There has been interval development of mild amount of ascites.  Additionally, there is omental thickening and a nodular enhancement in the right and left upper quadrants, suggestive of peritoneal carcinomatosis.    The gallbladder is surgically absent.  There is no evidence of intrahepatic biliary ductal dilatation.    The visualized portion of the bowel segments demonstrate no evidence of obstruction or inflammation.  No lymphadenopathy in the abdomen.   Impression         1. In this patient with history of RCC status post left nephrectomy, numerous hepatic metastatic lesions appear similar to prior study.  Index lesion is  slightly enlarged. Additionally, there has been interval development of ascites and omental enhancement suggestive of peritoneal carcinomatosis.    2. Small pleural effusions.           Pending Diagnostic Studies:     None        Final Active Diagnoses:    Diagnosis Date Noted POA    PRINCIPAL PROBLEM:  Liver metastases [C78.7] 07/13/2017 Yes    HTN (hypertension) [I10] 01/02/2013 Yes    Other specified hypothyroidism [E03.8] 03/08/2018 Yes    Constipation [K59.00] 03/08/2018 Yes    Gastroesophageal reflux disease without esophagitis [K21.9] 03/08/2018 Yes    Renal cell carcinoma [C64.9] 10/18/2017 Yes      Problems Resolved During this Admission:    Diagnosis Date Noted Date Resolved POA      Discharged Condition: fair    Disposition: Home or Self Care    Follow Up:  Follow-up Information     Adilson  MD Dayami.    Specialties:  Hematology and Oncology, Hematology  Why:  3/14/18 for immunotherapy  Contact information:  [address]  [facility] [address]  [phone]                 Patient Instructions:     Call MD for:  temperature >100.4     Call MD for:  persistent nausea and vomiting or diarrhea     Call MD for:  severe uncontrolled pain     Call MD for:  redness, tenderness, or signs of infection (pain, swelling, redness, odor or green/yellow discharge around incision site)     Call MD for:  difficulty breathing or increased cough     Call MD for:  severe persistent headache     Call MD for:  worsening rash     Call MD for:  persistent dizziness, light-headedness, or visual disturbances     Call MD for:  increased confusion or weakness       Medications:  Reconciled Home Medications:   Discharge Medication List as of 3/9/2018 11:29 AM      START taking these medications    Details   hydrOXYzine HCl (ATARAX) 25 MG tablet Take 1 tablet (25 mg total) by mouth 3 (three) times daily as needed for Itching., Starting Fri 3/9/2018, Normal      oxyCODONE (ROXICODONE) 5 MG immediate release tablet Take  1 tablet (5 mg total) by mouth every 3 (three) hours as needed (pain)., Starting Fri 3/9/2018, Normal      polyethylene glycol (GLYCOLAX) 17 gram PwPk Take 17 g by mouth 2 (two) times daily as needed., Starting Fri 3/9/2018, Normal      senna (SENOKOT) 8.6 mg tablet Take 1 tablet by mouth once daily., Starting Sat 3/10/2018, OTC      simethicone (MYLICON) 80 MG chewable tablet Take 1 tablet (80 mg total) by mouth 3 (three) times daily., Starting Fri 3/9/2018, OTC         CONTINUE these medications which have NOT CHANGED    Details   famotidine (PEPCID) 40 MG tablet Take 1 tablet (40 mg total) by mouth 2 (two) times daily., Starting Mon 12/4/2017, Until Tue 12/4/2018, Normal      levothyroxine (SYNTHROID) 75 MCG tablet Take by mouth. 1 Tablet Oral Every day, Until Discontinued, Historical Med      pantoprazole (PROTONIX) 40 MG tablet Take 40 mg by mouth once daily., Until Discontinued, Historical Med      dexamethasone (DECADRON) 0.5 mg/5 mL Elix SWISH AND SWALLOW 10 ML TWICE DAILY, Print      furosemide (LASIX) 40 MG tablet TAKE 1 TABLET BY MOUTH DAILY, Normal      hydrocortisone (ANUSOL-HC) 25 mg suppository Place 1 suppository (25 mg total) rectally 2 (two) times daily., Starting Mon 2/26/2018, Until Tue 2/26/2019, Normal      mupirocin calcium 2% (BACTROBAN) 2 % cream Apply to affected area 3 times daily, Normal         STOP taking these medications       verapamil (CALAN-SR) 180 MG CR tablet Comments:   Reason for Stopping:         AFINITOR 5 mg Tab Comments:   Reason for Stopping:         BOOSTRIX TDAP 2.5-8-5 Lf-mcg-Lf/0.5mL Susp Comments:   Reason for Stopping:         diltiaZEM (CARTIA XT) 240 MG 24 hr capsule Comments:   Reason for Stopping:          mg capsule Comments:   Reason for Stopping:         FLUZONE HIGH-DOSE 2017-18, PF, 180 mcg/0.5 mL vaccine Comments:   Reason for Stopping:         lenvatinib 10 mg/day (10 mg x 1/day) Cap Comments:   Reason for Stopping:         ondansetron (ZOFRAN, AS  HYDROCHLORIDE,) 4 MG tablet Comments:   Reason for Stopping:         paroxetine (PAXIL) 20 MG tablet Comments:   Reason for Stopping:         testosterone cypionate (DEPO-TESTOSTERONE) 200 mg/mL injection Comments:   Reason for Stopping:               Kemal Vernon MD  Hematology/Oncology  [facility]

## 2018-03-10 NOTE — HOSPITAL COURSE
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  He presented with uncontrolled abdominal pain, gas, and bloating, and further workup with MRI revealed progression of disease with peritoneal carcinomatosis.  He was started on standing simethicone and as needed oxycodone for symptom control.  Symptom control was achieved and he was discharged with instructions to follow up with his primary oncologist to potentially start immunotherapy as an outpatient.

## 2018-03-11 ENCOUNTER — DOCUMENTATION ONLY (OUTPATIENT)
Dept: HEMATOLOGY/ONCOLOGY | Facility: CLINIC | Age: 68
End: 2018-03-11

## 2018-03-11 NOTE — PROGRESS NOTES
ATTENDING NOTE    I have discussed his case at length with his [place] oncologist,. Dr. Lux.   He recommended that he be treated as the IMmotion 151 trial with atezolizumab plus avastin.  We will initiate the authorization process.

## 2018-03-12 ENCOUNTER — LAB VISIT (OUTPATIENT)
Dept: LAB | Facility: HOSPITAL | Age: 68
End: 2018-03-12
Attending: INTERNAL MEDICINE
Payer: MEDICARE

## 2018-03-12 ENCOUNTER — TELEPHONE (OUTPATIENT)
Dept: PHARMACY | Facility: CLINIC | Age: 68
End: 2018-03-12

## 2018-03-12 ENCOUNTER — TELEPHONE (OUTPATIENT)
Dept: HEMATOLOGY/ONCOLOGY | Facility: CLINIC | Age: 68
End: 2018-03-12

## 2018-03-12 DIAGNOSIS — Z51.11 ENCOUNTER FOR ANTINEOPLASTIC CHEMOTHERAPY: Primary | ICD-10-CM

## 2018-03-12 DIAGNOSIS — Z51.11 ENCOUNTER FOR ANTINEOPLASTIC CHEMOTHERAPY: ICD-10-CM

## 2018-03-12 LAB
ALBUMIN SERPL BCP-MCNC: 2.1 G/DL
ALP SERPL-CCNC: 249 U/L
ALT SERPL W/O P-5'-P-CCNC: 20 U/L
ANION GAP SERPL CALC-SCNC: 13 MMOL/L
AST SERPL-CCNC: 62 U/L
BILIRUB SERPL-MCNC: 0.4 MG/DL
BUN SERPL-MCNC: 18 MG/DL
CALCIUM SERPL-MCNC: 9.1 MG/DL
CHLORIDE SERPL-SCNC: 100 MMOL/L
CO2 SERPL-SCNC: 24 MMOL/L
CREAT SERPL-MCNC: 2.1 MG/DL
EST. GFR  (AFRICAN AMERICAN): 37 ML/MIN/1.73 M^2
EST. GFR  (NON AFRICAN AMERICAN): 32 ML/MIN/1.73 M^2
GLUCOSE SERPL-MCNC: 106 MG/DL
POTASSIUM SERPL-SCNC: 4.1 MMOL/L
PROT SERPL-MCNC: 6 G/DL
SODIUM SERPL-SCNC: 137 MMOL/L

## 2018-03-12 PROCEDURE — 80053 COMPREHEN METABOLIC PANEL: CPT | Mod: PO

## 2018-03-12 PROCEDURE — 36415 COLL VENOUS BLD VENIPUNCTURE: CPT | Mod: PO

## 2018-03-13 DIAGNOSIS — Z51.11 ENCOUNTER FOR ANTINEOPLASTIC CHEMOTHERAPY: Primary | ICD-10-CM

## 2018-03-13 DIAGNOSIS — C78.7 LIVER METASTASES: ICD-10-CM

## 2018-03-13 DIAGNOSIS — Z51.11 ENCOUNTER FOR ANTINEOPLASTIC CHEMOTHERAPY: ICD-10-CM

## 2018-03-13 RX ORDER — VERAPAMIL HYDROCHLORIDE 240 MG/1
TABLET, FILM COATED, EXTENDED RELEASE ORAL
Refills: 0 | COMMUNITY
Start: 2018-01-20 | End: 2018-08-07

## 2018-03-13 RX ORDER — DOCUSATE SODIUM 100 MG/1
CAPSULE, LIQUID FILLED ORAL
Qty: 90 CAPSULE | Refills: 0 | Status: SHIPPED | OUTPATIENT
Start: 2018-03-13 | End: 2018-08-07

## 2018-03-15 ENCOUNTER — TELEPHONE (OUTPATIENT)
Dept: HEMATOLOGY/ONCOLOGY | Facility: CLINIC | Age: 68
End: 2018-03-15

## 2018-03-16 ENCOUNTER — SURGERY (OUTPATIENT)
Age: 68
End: 2018-03-16

## 2018-03-16 ENCOUNTER — OFFICE VISIT (OUTPATIENT)
Dept: HEMATOLOGY/ONCOLOGY | Facility: CLINIC | Age: 68
End: 2018-03-16
Attending: RADIOLOGY
Payer: MEDICARE

## 2018-03-16 ENCOUNTER — INFUSION (OUTPATIENT)
Dept: INFUSION THERAPY | Facility: HOSPITAL | Age: 68
End: 2018-03-16
Attending: INTERNAL MEDICINE
Payer: MEDICARE

## 2018-03-16 ENCOUNTER — HOSPITAL ENCOUNTER (OUTPATIENT)
Facility: HOSPITAL | Age: 68
Discharge: HOME | End: 2018-03-16
Attending: RADIOLOGY | Admitting: RADIOLOGY
Payer: MEDICARE

## 2018-03-16 VITALS
BODY MASS INDEX: 25.76 KG/M2 | HEIGHT: 71 IN | TEMPERATURE: 98 F | SYSTOLIC BLOOD PRESSURE: 128 MMHG | OXYGEN SATURATION: 99 % | RESPIRATION RATE: 18 BRPM | HEART RATE: 94 BPM | DIASTOLIC BLOOD PRESSURE: 77 MMHG | WEIGHT: 184 LBS

## 2018-03-16 VITALS — RESPIRATION RATE: 18 BRPM | SYSTOLIC BLOOD PRESSURE: 159 MMHG | HEART RATE: 104 BPM | DIASTOLIC BLOOD PRESSURE: 91 MMHG

## 2018-03-16 VITALS
BODY MASS INDEX: 27.18 KG/M2 | HEART RATE: 115 BPM | SYSTOLIC BLOOD PRESSURE: 125 MMHG | TEMPERATURE: 98 F | WEIGHT: 194.88 LBS | DIASTOLIC BLOOD PRESSURE: 81 MMHG | RESPIRATION RATE: 17 BRPM

## 2018-03-16 DIAGNOSIS — C64.9 RENAL CELL CARCINOMA, UNSPECIFIED LATERALITY: Primary | ICD-10-CM

## 2018-03-16 DIAGNOSIS — D63.0 ANEMIA IN NEOPLASTIC DISEASE: ICD-10-CM

## 2018-03-16 DIAGNOSIS — Z51.11 ENCOUNTER FOR ANTINEOPLASTIC CHEMOTHERAPY: ICD-10-CM

## 2018-03-16 DIAGNOSIS — C78.7 LIVER METASTASES: ICD-10-CM

## 2018-03-16 PROCEDURE — 63600175 PHARM REV CODE 636 W HCPCS: Performed by: INTERNAL MEDICINE

## 2018-03-16 PROCEDURE — 96415 CHEMO IV INFUSION ADDL HR: CPT

## 2018-03-16 PROCEDURE — 25000003 PHARM REV CODE 250: Performed by: FAMILY MEDICINE

## 2018-03-16 PROCEDURE — 25000003 PHARM REV CODE 250: Performed by: RADIOLOGY

## 2018-03-16 PROCEDURE — 96417 CHEMO IV INFUS EACH ADDL SEQ: CPT

## 2018-03-16 PROCEDURE — 99999 PR PBB SHADOW E&M-EST. PATIENT-LVL III: CPT | Mod: PBBFAC,,, | Performed by: INTERNAL MEDICINE

## 2018-03-16 PROCEDURE — 99215 OFFICE O/P EST HI 40 MIN: CPT | Mod: S$PBB,,, | Performed by: INTERNAL MEDICINE

## 2018-03-16 PROCEDURE — 63600175 PHARM REV CODE 636 W HCPCS: Mod: JG | Performed by: INTERNAL MEDICINE

## 2018-03-16 PROCEDURE — 63600175 PHARM REV CODE 636 W HCPCS: Performed by: RADIOLOGY

## 2018-03-16 PROCEDURE — 25000003 PHARM REV CODE 250: Performed by: INTERNAL MEDICINE

## 2018-03-16 PROCEDURE — 63600175 PHARM REV CODE 636 W HCPCS: Performed by: FAMILY MEDICINE

## 2018-03-16 PROCEDURE — 99213 OFFICE O/P EST LOW 20 MIN: CPT | Mod: PBBFAC,25 | Performed by: INTERNAL MEDICINE

## 2018-03-16 PROCEDURE — 96413 CHEMO IV INFUSION 1 HR: CPT

## 2018-03-16 RX ORDER — FENTANYL CITRATE 50 UG/ML
50 INJECTION, SOLUTION INTRAMUSCULAR; INTRAVENOUS
Status: DISCONTINUED | OUTPATIENT
Start: 2018-03-16 | End: 2018-03-20 | Stop reason: HOSPADM

## 2018-03-16 RX ORDER — HEPARIN SODIUM 200 [USP'U]/100ML
500 INJECTION, SOLUTION INTRAVENOUS CONTINUOUS
Status: DISCONTINUED | OUTPATIENT
Start: 2018-03-16 | End: 2018-03-20 | Stop reason: HOSPADM

## 2018-03-16 RX ORDER — HEPARIN 100 UNIT/ML
500 SYRINGE INTRAVENOUS
Status: CANCELLED | OUTPATIENT
Start: 2018-03-16

## 2018-03-16 RX ORDER — MIDAZOLAM HYDROCHLORIDE 1 MG/ML
INJECTION INTRAMUSCULAR; INTRAVENOUS CODE/TRAUMA/SEDATION MEDICATION
Status: COMPLETED | OUTPATIENT
Start: 2018-03-16 | End: 2018-03-16

## 2018-03-16 RX ORDER — HEPARIN 100 UNIT/ML
5 SYRINGE INTRAVENOUS ONCE
Status: COMPLETED | OUTPATIENT
Start: 2018-03-16 | End: 2018-03-16

## 2018-03-16 RX ORDER — SODIUM CHLORIDE 0.9 % (FLUSH) 0.9 %
10 SYRINGE (ML) INJECTION
Status: DISCONTINUED | OUTPATIENT
Start: 2018-03-16 | End: 2018-03-16 | Stop reason: HOSPADM

## 2018-03-16 RX ORDER — SODIUM CHLORIDE 9 MG/ML
500 INJECTION, SOLUTION INTRAVENOUS ONCE
Status: COMPLETED | OUTPATIENT
Start: 2018-03-16 | End: 2018-03-16

## 2018-03-16 RX ORDER — HEPARIN 100 UNIT/ML
500 SYRINGE INTRAVENOUS
Status: DISCONTINUED | OUTPATIENT
Start: 2018-03-16 | End: 2018-03-16 | Stop reason: HOSPADM

## 2018-03-16 RX ORDER — FENTANYL CITRATE 50 UG/ML
INJECTION, SOLUTION INTRAMUSCULAR; INTRAVENOUS CODE/TRAUMA/SEDATION MEDICATION
Status: COMPLETED | OUTPATIENT
Start: 2018-03-16 | End: 2018-03-16

## 2018-03-16 RX ORDER — LIDOCAINE HYDROCHLORIDE 10 MG/ML
1 INJECTION, SOLUTION EPIDURAL; INFILTRATION; INTRACAUDAL; PERINEURAL ONCE
Status: COMPLETED | OUTPATIENT
Start: 2018-03-16 | End: 2018-03-16

## 2018-03-16 RX ORDER — MIDAZOLAM HYDROCHLORIDE 1 MG/ML
1 INJECTION INTRAMUSCULAR; INTRAVENOUS
Status: DISCONTINUED | OUTPATIENT
Start: 2018-03-16 | End: 2018-03-20 | Stop reason: HOSPADM

## 2018-03-16 RX ORDER — SODIUM CHLORIDE 0.9 % (FLUSH) 0.9 %
10 SYRINGE (ML) INJECTION
Status: CANCELLED | OUTPATIENT
Start: 2018-03-16

## 2018-03-16 RX ORDER — MEGESTROL ACETATE 40 MG/ML
400 SUSPENSION ORAL DAILY
Qty: 300 ML | Refills: 11 | Status: SHIPPED | OUTPATIENT
Start: 2018-03-16 | End: 2018-08-07

## 2018-03-16 RX ORDER — CEFAZOLIN SODIUM 1 G/3ML
1 INJECTION, POWDER, FOR SOLUTION INTRAMUSCULAR; INTRAVENOUS
Status: COMPLETED | OUTPATIENT
Start: 2018-03-16 | End: 2018-03-16

## 2018-03-16 RX ADMIN — FENTANYL CITRATE 25 MCG: 50 INJECTION, SOLUTION INTRAMUSCULAR; INTRAVENOUS at 12:03

## 2018-03-16 RX ADMIN — FENTANYL CITRATE 25 MCG: 50 INJECTION, SOLUTION INTRAMUSCULAR; INTRAVENOUS at 11:03

## 2018-03-16 RX ADMIN — HEPARIN 500 UNITS: 100 SYRINGE at 06:03

## 2018-03-16 RX ADMIN — SODIUM CHLORIDE: 9 INJECTION, SOLUTION INTRAVENOUS at 03:03

## 2018-03-16 RX ADMIN — MIDAZOLAM HYDROCHLORIDE 0.5 MG: 1 INJECTION, SOLUTION INTRAMUSCULAR; INTRAVENOUS at 11:03

## 2018-03-16 RX ADMIN — FENTANYL CITRATE 50 MCG: 50 INJECTION, SOLUTION INTRAMUSCULAR; INTRAVENOUS at 11:03

## 2018-03-16 RX ADMIN — SODIUM CHLORIDE 500 ML: 9 INJECTION, SOLUTION INTRAVENOUS at 09:03

## 2018-03-16 RX ADMIN — BEVACIZUMAB 1330 MG: 400 INJECTION, SOLUTION INTRAVENOUS at 03:03

## 2018-03-16 RX ADMIN — ATEZOLIZUMAB 1200 MG: 1200 INJECTION, SOLUTION INTRAVENOUS at 05:03

## 2018-03-16 RX ADMIN — HEPARIN SODIUM (PORCINE) LOCK FLUSH IV SOLN 100 UNIT/ML 500 UNITS: 100 SOLUTION at 12:03

## 2018-03-16 RX ADMIN — CEFAZOLIN 1 G: 330 INJECTION, POWDER, FOR SOLUTION INTRAMUSCULAR; INTRAVENOUS at 10:03

## 2018-03-16 RX ADMIN — LIDOCAINE HYDROCHLORIDE 0.2 MG: 10 INJECTION, SOLUTION EPIDURAL; INFILTRATION; INTRACAUDAL; PERINEURAL at 09:03

## 2018-03-16 NOTE — PLAN OF CARE
Problem: Patient Care Overview  Goal: Plan of Care Review  Outcome: Ongoing (interventions implemented as appropriate)  Pt tolerated first dose Avastin/Tecentriq without complication. PAC hep locked and deaccessed; instructed pt to keep dressing dry and intact for 3 days. Wife requesting appetite stimulant called in; Fellow on call notified and ordered liquid megace per Dr Martinez. Pt and wife instructed on use. VSS; NAD. Discharging unassisted.

## 2018-03-16 NOTE — PROGRESS NOTES
Subjective:       Patient ID: Bud Russo III is a 67 y.o. male.    Chief Complaint: No chief complaint on file.    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  He is due to begin his next chemotherapy regime, now with avastin and atezolizumab.  He underwent a port placement earlier today.    His recent restaging scans showed evidence of peritoneal metastases.     His CBC shows a WBC count of 11,200 /mm3, Hg 11.6 gr/dl, Ht 38.3 % and platelets 352,000 /mm3, with an MCV of 77.  His creatinine is 1.8 mg/dl,  albumin 2.1 gr/dl, and and his bilirubin is 0.6 mg/dl.      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  His diagnosis was established on April 30th when he presented to an Emergency Room in [place], [place], where he lived with left flank pain.  Radiologic studies showed a mass in his left kidney.  Subsequently, he went to [place], [place], where he was staged and was found to have a mass in his liver.  On 05/23/2017, he   underwent an embolization of his tumor in preparation for a possible liver resection; and on 06/06/2017, he underwent a left nephrectomy.  He remained in the [place] area in preparation of a liver resection; however, an MRI of his abdomen and pelvis that was done showed that there was significant progression of the metastatic liver disease with interval increase in size of a dominant right hepatic lobe mass from 6.1 cm previously to 11.2 cm with evidence of internal necrosis and peripheral enhancement.  In addition, multiple new lesions were identified in both hepatic lobes, measuring up to 1.9 cm in the left lateral lobe and 1.2 cm in the right hepatic lobe.  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  The patient met with an oncologist at [place], and he was offered chemotherapy.  He had decided to return to [place] and receive chemotherapy  at our facility.  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  He started chemotherapy several months ago, and completed three cycles.   Unfortunately the staging MRI scan showed that compared to his prior CT of July 19, 2017 there has been an interval increase in number and size of the foci of hepatic metastatic disease within the liver, hence the initiation of second line treatment with the combination of lenvatinib plus afinitor, however, he has now progressed with evidence of peritoneal disease.  Review of Systems      Overall he feels much better since his discharge   He is able to tolerate po feedings but does not have a great appetite.  His abdominal pain is controlled with oxycodone, of which he takes 4-6 pills per day.   He has not had a fever for the last month.  He denies any anxiety, depression, easy bruising, fevers, chills, night  sweats, weight loss, nausea, vomiting, diarrhea, constipation, diplopia, blurred vision, headache, chest pain, palpitations, shortness of breath, breast pain, abdominal pain, extremity pain, or difficulty ambulating.  The remainder of the ten-point ROS, including general, skin, lymph, H/N, cardiorespiratory, GI, , Neuro, Endocrine, and psychiatric is negative.     Objective:      Physical Exam    He is alert, oriented to time, place, person, pleasant, well      nourished, in no acute physical distress.  He is here with his wife.                                  VITAL SIGNS:  Reviewed                                      HEENT:  Normal.  There are no nasal, oral, lip, gingival, auricular, lid,    or conjunctival lesions.  Mucosae are moist and pink, and there is no        thrush.  Pupils are equal, reactive to light and accommodation.              Extraocular muscle movements are intact.     Dentition is good.                                   NECK:  Supple without JVD, adenopathy, or thyromegaly.                       LUNGS:  Clear to auscultation without wheezing, rales, or rhonchi.           CARDIOVASCULAR:  Reveals an S1, S2, no murmurs, no rubs, no gallops.         ABDOMEN:  Soft, nontender, without organomegaly.  Bowel sounds are  present.  A scar from his left nephrectomy is identified.     Scars from bilateral varicocele surgeries are identified.                                                                 EXTREMITIES:  No cyanosis, clubbing, or edema.     A right subclavian port is now identified.                                                            LYMPHATIC:  There is no cervical, axillary, or supraclavicular adenopathy.   SKIN:  Warm and moist, without petechiae, rashes, induration, or ecchymoses.           NEUROLOGIC:  DTRs are 0-1+ bilaterally, symmetrical, motor function is 5/5,  and cranial nerves are  within normal limits.  Both fundi are sharp.    Assessment:       1. Renal cell carcinoma of kidney excluding renal pelvis    2. CRF, imrpoved    3. Liver metastases    4. Encounter for antineoplastic chemotherapy     5.     Intermittent epistaxis.  6.      Increased transaminases, improving.  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  Consent form signed.  The pros and cons of treatment with atezolizumab and avastin were described to him.  He will begin his new regimen today and see me again in three weeks.  His multiple questions were answered to his satisfaction.

## 2018-03-16 NOTE — PROGRESS NOTES
Pt and wife verbalized understanding of discharge (AVS) instructions.VSS. No complaints at this time. IV Dc'd. Pt left via wheelchair to [facility] for next appointment.

## 2018-03-16 NOTE — PROGRESS NOTES
Pt to [facility]. Report received from HALI Blackmon. No complaints or signs of discomfort at this time. Will continue to monitor.  Family at bedside.

## 2018-03-16 NOTE — SEDATION DOCUMENTATION
Pt aware of delay in start of procedure, awaiting MD.  Verbalizes understanding, denies needs at present.

## 2018-03-16 NOTE — PROCEDURES
Radiology Post-Procedure Note    Pre Op Diagnosis: RCC    Post Op Diagnosis: Same    Procedure: PORT placement    Procedure performed by: Jf Modi MD    Written Informed Consent Obtained: Yes    Specimen Removed: No    Estimated Blood Loss: Minimal    Findings:   Using realtime U/S guidance an 8 Fr port catheter was placed into the right IJ vein with tip of the catheter in the SVC.    Port is ready for use.     Jf Modi MD  Department of Radiology  Pager: [phone]

## 2018-03-16 NOTE — DISCHARGE INSTRUCTIONS
Interventional Radiology [phone]  Mon-Fri  8A-4P  24hr Radiology Resident on call [phone]

## 2018-03-16 NOTE — H&P
Radiology History & Physical      SUBJECTIVE:     Chief Complaint: metastatic RCC    History of Present Illness:  Bud Russo III is a 67 y.o. male who presents for right chest port placement.    Past Medical History:   Diagnosis Date    Hypertension     Ventricular tachycardia     frequent PVCs c/w outflow tract etiology     No past surgical history on file.    Home Meds:   Prior to Admission medications    Medication Sig Start Date End Date Taking? Authorizing Provider   dexamethasone (DECADRON) 0.5 mg/5 mL Elix SWISH AND SWALLOW 10 ML TWICE DAILY 1/15/18   Adilson Stock MD    mg capsule TAKE ONE CAPSULE BY MOUTH THREE TIMES DAILY AS NEEDED FOR CONSTIPATION 3/13/18   Adilson Stock MD   famotidine (PEPCID) 40 MG tablet Take 1 tablet (40 mg total) by mouth 2 (two) times daily.  Patient taking differently: Take 40 mg by mouth every evening.  12/4/17 12/4/18  Mane Garsia MD   furosemide (LASIX) 40 MG tablet TAKE 1 TABLET BY MOUTH DAILY 3/5/18   Adilson Stock MD   hydrocortisone (ANUSOL-HC) 25 mg suppository Place 1 suppository (25 mg total) rectally 2 (two) times daily. 2/26/18 2/26/19  Adilson Stock MD   hydrOXYzine HCl (ATARAX) 25 MG tablet Take 1 tablet (25 mg total) by mouth 3 (three) times daily as needed for Itching. 3/9/18   Kemal Vernon MD   lenvatinib 18 mg/day (10 mg x 1-4 mg x2) Cap Take 18 mg by mouth once daily. 3/12/18   Adilson Stock MD   levothyroxine (SYNTHROID) 75 MCG tablet Take by mouth. 1 Tablet Oral Every day    Historical Provider, MD   mupirocin calcium 2% (BACTROBAN) 2 % cream Apply to affected area 3 times daily 2/26/18 2/26/19  Adilson Stock MD   oxyCODONE (ROXICODONE) 5 MG immediate release tablet Take 1 tablet (5 mg total) by mouth every 3 (three) hours as needed (pain). 3/9/18   Kemal Vernon MD   pantoprazole (PROTONIX) 40 MG tablet Take 40 mg by mouth once daily.    Historical Provider, MD   polyethylene glycol (GLYCOLAX) 17 gram PwPk Take 17 g  by mouth 2 (two) times daily as needed. 3/9/18   Kemal Vernon MD   senna (SENOKOT) 8.6 mg tablet Take 1 tablet by mouth once daily. 3/10/18   Kemal Vernon MD   simethicone (MYLICON) 80 MG chewable tablet Take 1 tablet (80 mg total) by mouth 3 (three) times daily. 3/9/18   Kemal Vernon MD   verapamil (CALAN-SR) 240 MG CR tablet TK 1 T PO QD 1/20/18   Historical Provider, MD     Anticoagulants/Antiplatelets: no anticoagulation    Allergies:   Review of patient's allergies indicates:   Allergen Reactions    Adhesive tape-silicones      Other reaction(s): Rash    Sulfa (sulfonamide antibiotics)      Other reaction(s): Hives     Sedation History:  no adverse reactions    Review of Systems:   As documented in primary provider H&P.      OBJECTIVE:     Vital Signs (Most Recent)       Physical Exam:  ASA: 3  Mallampati: 1      Laboratory  Lab Results   Component Value Date    INR 1.0 03/08/2018       Lab Results   Component Value Date    WBC 8.29 03/09/2018    HGB 10.5 (L) 03/09/2018    HCT 33.8 (L) 03/09/2018    MCV 78 (L) 03/09/2018     03/09/2018      Lab Results   Component Value Date     03/12/2018     03/12/2018    K 4.1 03/12/2018     03/12/2018    CO2 24 03/12/2018    BUN 18 03/12/2018    CREATININE 2.1 (H) 03/12/2018    CALCIUM 9.1 03/12/2018    MG 1.6 03/09/2018    ALT 20 03/12/2018    AST 62 (H) 03/12/2018    ALBUMIN 2.1 (L) 03/12/2018    BILITOT 0.4 03/12/2018       ASSESSMENT/PLAN:     Sedation Plan: moderate  Patient will undergo right chest port placement.

## 2018-03-16 NOTE — DISCHARGE SUMMARY
Radiology Discharge Summary      Hospital Course: No complications    Admit Date: 3/16/2018  Discharge Date: 03/16/2018     Instructions Given to Patient: Yes  Diet: Resume prior diet  Activity: activity as tolerated and no driving for today    Description of Condition on Discharge: Stable  Vital Signs (Most Recent): Temp: 97.7 °F (36.5 °C) (03/16/18 1230)  Pulse: 94 (03/16/18 1300)  Resp: 18 (03/16/18 1300)  BP: 128/77 (03/16/18 1300)  SpO2: 99 % (03/16/18 1300)    Discharge Disposition: Home    Discharge Diagnosis:   RCC   Port placement     Follow-up: per referring physician    Jf Modi MD  Department of Radiology  Pager: [phone]

## 2018-03-20 ENCOUNTER — TELEPHONE (OUTPATIENT)
Dept: PHARMACY | Facility: CLINIC | Age: 68
End: 2018-03-20

## 2018-03-20 NOTE — TELEPHONE ENCOUNTER
Shahbaz confirmed Afinitor/Lenvima has been discontinued and new therapy initiated 3/16.  Advised her to turn in leftover medication to provider or pharmacy for disposal.

## 2018-03-22 DIAGNOSIS — C64.9 RENAL CELL CARCINOMA, UNSPECIFIED LATERALITY: ICD-10-CM

## 2018-03-22 RX ORDER — OXYCODONE HYDROCHLORIDE 5 MG/1
5 TABLET ORAL
Qty: 120 TABLET | Refills: 0 | Status: SHIPPED | OUTPATIENT
Start: 2018-03-22 | End: 2018-04-06 | Stop reason: SDUPTHER

## 2018-04-04 PROBLEM — K59.00 CONSTIPATION: Status: ACTIVE | Noted: 2018-04-04

## 2018-04-04 NOTE — PROGRESS NOTES
Subjective:       Patient ID: Bud Russo III is a 67 y.o. male.    Chief Complaint: No chief complaint on file.    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  He is due for hsi next cycle of avastin and atezolizumab.      His recent restaging scans had shown evidence of peritoneal metastases.     His CBC shows a WBC count of 12,900 /mm3, Hg 10.9 gr/dl, Ht 36.5 % and platelets 192,000 /mm3, with an MCV of 77.  His creatinine is 1.6 mg/dl,  albumin 2.2 gr/dl, and and his bilirubin is 0.6 mg/dl.      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  His diagnosis was established on April 30th when he presented to an Emergency Room in [place], [place], where he lived with left flank pain.  Radiologic studies showed a mass in his left kidney.  Subsequently, he went to [place], [place], where he was staged and was found to have a mass in his liver.  On 05/23/2017, he   underwent an embolization of his tumor in preparation for a possible liver resection; and on 06/06/2017, he underwent a left nephrectomy.  He remained in the [place] area in preparation of a liver resection; however, an MRI of his abdomen and pelvis that was done showed that there was significant progression of the metastatic liver disease with interval increase in size of a dominant right hepatic lobe mass from 6.1 cm previously to 11.2 cm with evidence of internal necrosis and peripheral enhancement.  In addition, multiple new lesions were identified in both hepatic lobes, measuring up to 1.9 cm in the left lateral lobe and 1.2 cm in the right hepatic lobe.  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  The patient met with an oncologist at [place], and he was offered chemotherapy.  He had decided to return to [place] and receive chemotherapy at our facility.  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  He started chemotherapy several months ago, and completed three cycles.   Unfortunately the staging MRI scan showed that compared to his prior CT of July 19, 2017 there has been an interval increase in number and size of the foci of hepatic metastatic disease within the liver, hence the initiation of second line treatment with the combination of lenvatinib plus afinitor, however, he again progressed with evidence of peritoneal disease, hence the initiation of his third line regimen.  He is due for cycle 2 today.      Review of Systems      Overall he feels fair.  He states that his appetite is rather poor, but he is able to eat so long as he takes a zofran 30 minutes prior to his meals.  He is asking for a refill.  His pain is reasonably well controlled with 5-6 oxycodone tablets per day.  He has not had a fever for the last month.  He denies any anxiety, depression, easy bruising, fevers, chills, night  sweats, vomiting, diarrhea, diplopia, blurred vision, headache, chest pain, palpitations, shortness of breath, breast pain, abdominal pain, extremity pain, or difficulty ambulating.  The remainder of the ten-point ROS, including general, skin, lymph, H/N, cardiorespiratory, GI, , Neuro, Endocrine, and psychiatric is negative.     Objective:      Physical Exam    He is alert, oriented to time, place, person, pleasant, well      nourished, in no acute physical distress.  He is here with his wife.                                  VITAL SIGNS:  Reviewed                                      HEENT:  Normal.  There are no nasal, oral, lip, gingival, auricular, lid,    or conjunctival lesions.  Mucosae are moist and pink, and there is no        thrush.  Pupils are equal, reactive to light and accommodation.              Extraocular muscle movements are intact.     Dentition is good.                                   NECK:  Supple without JVD, adenopathy, or thyromegaly.                       LUNGS:  Clear to auscultation without wheezing, rales, or rhonchi.           CARDIOVASCULAR:  Reveals an S1, S2, no murmurs, no rubs, no gallops.         ABDOMEN:  Soft, nontender, without organomegaly.  Bowel sounds are  present.  A scar from his left nephrectomy is identified.     Scars from bilateral varicocele surgeries are identified.                                                                 EXTREMITIES:  No cyanosis, clubbing, or edema.     A right subclavian port is now identified.                                                            LYMPHATIC:  There is no cervical, axillary, or supraclavicular adenopathy.   SKIN:  Warm and moist, without petechiae, rashes, induration, or ecchymoses.           NEUROLOGIC:  DTRs are 0-1+ bilaterally, symmetrical, motor function is 5/5,  and cranial nerves are  within normal limits.  Both fundi are sharp.    Assessment:       1. Renal cell carcinoma of kidney excluding renal pelvis    2. CRF, imrpoved    3. Liver metastases    4. Encounter for antineoplastic chemotherapy     5.     Intermittent epistaxis.  6.      Increased transaminases, improving.  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  He will proceed with his second dose of atezolizumab and avastin, and see me again in three weeks.  He will continue oxycodone and zofran as needed, and he will increase his colace to two pills per day.  In addition, he may have miralax as needed.  His multiple questions were answered to his satisfaction.

## 2018-04-06 ENCOUNTER — OFFICE VISIT (OUTPATIENT)
Dept: HEMATOLOGY/ONCOLOGY | Facility: CLINIC | Age: 68
End: 2018-04-06
Payer: MEDICARE

## 2018-04-06 ENCOUNTER — INFUSION (OUTPATIENT)
Dept: INFUSION THERAPY | Facility: HOSPITAL | Age: 68
End: 2018-04-06
Attending: INTERNAL MEDICINE
Payer: MEDICARE

## 2018-04-06 VITALS
HEART RATE: 86 BPM | TEMPERATURE: 98 F | WEIGHT: 190.5 LBS | SYSTOLIC BLOOD PRESSURE: 112 MMHG | HEIGHT: 71 IN | DIASTOLIC BLOOD PRESSURE: 80 MMHG | BODY MASS INDEX: 26.67 KG/M2 | RESPIRATION RATE: 18 BRPM

## 2018-04-06 VITALS — HEART RATE: 110 BPM | RESPIRATION RATE: 18 BRPM | SYSTOLIC BLOOD PRESSURE: 129 MMHG | DIASTOLIC BLOOD PRESSURE: 74 MMHG

## 2018-04-06 DIAGNOSIS — K59.00 CONSTIPATION, UNSPECIFIED CONSTIPATION TYPE: ICD-10-CM

## 2018-04-06 DIAGNOSIS — C64.9 RENAL CELL CARCINOMA, UNSPECIFIED LATERALITY: Primary | ICD-10-CM

## 2018-04-06 DIAGNOSIS — C64.9 RENAL CELL CARCINOMA, UNSPECIFIED LATERALITY: ICD-10-CM

## 2018-04-06 DIAGNOSIS — D63.0 ANEMIA IN NEOPLASTIC DISEASE: ICD-10-CM

## 2018-04-06 DIAGNOSIS — C78.7 LIVER METASTASES: Primary | ICD-10-CM

## 2018-04-06 DIAGNOSIS — C78.7 LIVER METASTASES: ICD-10-CM

## 2018-04-06 DIAGNOSIS — Z51.11 ENCOUNTER FOR ANTINEOPLASTIC CHEMOTHERAPY: ICD-10-CM

## 2018-04-06 PROCEDURE — 63600175 PHARM REV CODE 636 W HCPCS: Mod: TB | Performed by: INTERNAL MEDICINE

## 2018-04-06 PROCEDURE — 96417 CHEMO IV INFUS EACH ADDL SEQ: CPT

## 2018-04-06 PROCEDURE — 96413 CHEMO IV INFUSION 1 HR: CPT

## 2018-04-06 PROCEDURE — 99212 OFFICE O/P EST SF 10 MIN: CPT | Mod: PBBFAC | Performed by: INTERNAL MEDICINE

## 2018-04-06 PROCEDURE — 25000003 PHARM REV CODE 250: Performed by: INTERNAL MEDICINE

## 2018-04-06 PROCEDURE — 99215 OFFICE O/P EST HI 40 MIN: CPT | Mod: S$PBB,,, | Performed by: INTERNAL MEDICINE

## 2018-04-06 PROCEDURE — 99999 PR PBB SHADOW E&M-EST. PATIENT-LVL II: CPT | Mod: PBBFAC,,, | Performed by: INTERNAL MEDICINE

## 2018-04-06 RX ORDER — OXYCODONE HYDROCHLORIDE 5 MG/1
5 TABLET ORAL
Qty: 120 TABLET | Refills: 0 | Status: SHIPPED | OUTPATIENT
Start: 2018-04-06 | End: 2018-04-27 | Stop reason: ALTCHOICE

## 2018-04-06 RX ORDER — SODIUM CHLORIDE 0.9 % (FLUSH) 0.9 %
10 SYRINGE (ML) INJECTION
Status: CANCELLED | OUTPATIENT
Start: 2018-04-06

## 2018-04-06 RX ORDER — HEPARIN 100 UNIT/ML
500 SYRINGE INTRAVENOUS
Status: CANCELLED | OUTPATIENT
Start: 2018-04-06

## 2018-04-06 RX ORDER — HEPARIN 100 UNIT/ML
500 SYRINGE INTRAVENOUS
Status: DISCONTINUED | OUTPATIENT
Start: 2018-04-06 | End: 2018-04-06 | Stop reason: HOSPADM

## 2018-04-06 RX ORDER — SODIUM CHLORIDE 0.9 % (FLUSH) 0.9 %
10 SYRINGE (ML) INJECTION
Status: DISCONTINUED | OUTPATIENT
Start: 2018-04-06 | End: 2018-04-06 | Stop reason: HOSPADM

## 2018-04-06 RX ORDER — ONDANSETRON 4 MG/1
TABLET, FILM COATED ORAL
Qty: 30 TABLET | Refills: 1 | Status: SHIPPED | OUTPATIENT
Start: 2018-04-06 | End: 2018-08-07

## 2018-04-06 RX ADMIN — BEVACIZUMAB 1330 MG: 400 INJECTION, SOLUTION INTRAVENOUS at 02:04

## 2018-04-06 RX ADMIN — ATEZOLIZUMAB 1200 MG: 1200 INJECTION, SOLUTION INTRAVENOUS at 03:04

## 2018-04-12 ENCOUNTER — TELEPHONE (OUTPATIENT)
Dept: HEMATOLOGY/ONCOLOGY | Facility: CLINIC | Age: 68
End: 2018-04-12

## 2018-04-12 NOTE — TELEPHONE ENCOUNTER
Returned call to pt wife.   Pt wife stated that she is calling to see how much Megace Dr. Martinez wants for pt- stated that 10mL was called in and not sure if that is how much Dr. Martinez wanted to give.   Informed pt wife would check on dosing.   Pt wife stated that she spoke with [facility] yesterday and would need to initiate a prior auth on medication.   Informed pt wife would check on dosing and verify correct and if correct would proceed with PA.   Pt wife verbalized understanding and thanked nurse.     Message fwd to Dr. Martinez.           ----- Message from Agueda Jewell sent at 4/11/2018  1:44 PM CDT -----  Contact: Denise, pt's wife  Denise is calling to speak with nurse in regards to Megestrol acetate 40 mg/ml.  Stated that pt will try taking the medication again, Rx #[id number].    [facility] at [phone]    Denise is requesting a prior authorization and can be reached at [phone].  Thank you    [phone]

## 2018-04-12 NOTE — TELEPHONE ENCOUNTER
Called pt wife and informed that Dr. Martinez would like on 10mL dosage.   Informed pt wife submitted PA however it said: Message from [facility] is not covered by plan.   Pt wife stated that she will contact her insurance.

## 2018-04-26 PROBLEM — D64.9 ANEMIA: Status: RESOLVED | Noted: 2017-10-12 | Resolved: 2018-04-26

## 2018-04-26 NOTE — PROGRESS NOTES
Subjective:       Patient ID: Bud Russo III is a 67 y.o. male.    Chief Complaint: No chief complaint on file.    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  He is due for his next cycle of avastin and atezolizumab.      His most recent restaging scans had shown evidence of peritoneal metastases.     His CBC shows a WBC count of 13,100 /mm3, Hg 12.1 gr/dl, Ht 40.0 % and platelets 348,000 /mm3, with an MCV of 87.  His creatinine is 1.4 mg/dl,  albumin 2.3 gr/dl, and and his bilirubin is 0.6 mg/dl.  Alkaline phosphatase is 376 U/L, ASt is 88 and ALT is 14 U/L.   His K is 5.7 mEq/L.  Calcium is 10.4 mg/dl while his albumin is 2.3 gr/dl.      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  His diagnosis was established on April 30th when he presented to an Emergency Room in [place], [place], where he lived with left flank pain.  Radiologic studies showed a mass in his left kidney.  Subsequently, he went to [place], [place], where he was staged and was found to have a mass in his liver.  On 05/23/2017, he   underwent an embolization of his tumor in preparation for a possible liver resection; and on 06/06/2017, he underwent a left nephrectomy.  He remained in the [place] area in preparation of a liver resection; however, an MRI of his abdomen and pelvis that was done showed that there was significant progression of the metastatic liver disease with interval increase in size of a dominant right hepatic lobe mass from 6.1 cm previously to 11.2 cm with evidence of internal necrosis and peripheral enhancement.  In addition, multiple new lesions were identified in both hepatic lobes, measuring up to 1.9 cm in the left lateral lobe and 1.2 cm in the right hepatic lobe.  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  The patient met with an oncologist at [place], and he was offered  chemotherapy.  He had decided to return to [place] and receive chemotherapy at our facility.  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  He started chemotherapy several months ago, and completed three cycles.   Unfortunately the staging MRI scan showed that compared to his prior CT of July 19, 2017 there has been an interval increase in number and size of the foci of hepatic metastatic disease within the liver, hence the initiation of second line treatment with the combination of lenvatinib plus afinitor, however, he again progressed with evidence of peritoneal disease, hence the initiation of his third line regimen.  He is due for cycle 3 today.    Dr. Russo had E-mailed me 5 days ago that he had stopped taking his oxycodone due to side effects (sleepiness, constipation).  Today he informed that he has been able to obtain a THC derivative (through the mail) which did not give him any side effects, and his appetite may be somewhat better.      Review of Systems      Overall he feels fair.  He states that his appetite is still diminished.   He no longer has constipation, and he has essentially weaned himself off the oxycodone.  He has not had any fever.  He complains of fatigue and generalized weakness, and he is here on a wheelchair today.   He denies any anxiety, depression, easy bruising, fevers, chills, night  sweats, vomiting, diarrhea, diplopia, blurred vision, headache, chest pain, palpitations, shortness of breath, breast pain, extremity pain, or difficulty ambulating.  He does experience intermittent abdominal pain.  The remainder of the ten-point ROS, including general, skin, lymph, H/N, cardiorespiratory, GI, , Neuro, Endocrine, and psychiatric is negative.     Objective:      Physical Exam    He is alert, oriented to time, place, person, pleasant, well      nourished,  in no acute physical distress.  He is here with his wife.                                  VITAL SIGNS:  Reviewed                                      HEENT:  Normal.  There are no nasal, oral, lip, gingival, auricular, lid,    or conjunctival lesions.  Mucosae are moist and pink, and there is no        thrush.  Pupils are equal, reactive to light and accommodation.              Extraocular muscle movements are intact.     Dentition is good.                                  NECK:  Supple without JVD, adenopathy, or thyromegaly.                       LUNGS:  Clear to auscultation without wheezing, rales, or rhonchi.           CARDIOVASCULAR:  Reveals an S1, S2, no murmurs, no rubs, no gallops.         ABDOMEN:  Soft, nontender, without organomegaly.  Bowel sounds are  present.  A scar from his left nephrectomy is identified.     Scars from bilateral varicocele surgeries are identified.                                                                 EXTREMITIES:  No cyanosis, clubbing, or edema.     A right subclavian port is now identified.                                                            LYMPHATIC:  There is no cervical, axillary, or supraclavicular adenopathy.   SKIN:  Warm and moist, without petechiae, rashes, induration, or ecchymoses.           NEUROLOGIC:  DTRs are 0-1+ bilaterally, symmetrical, motor function is 5/5,  and cranial nerves are  within normal limits.  Both fundi are sharp.    Assessment:       1. Renal cell carcinoma of kidney excluding renal pelvis    2. CRF, imrpoved    3. Liver metastases    4. Encounter for antineoplastic chemotherapy     5.     hyperkalemia  6.      Increased transaminases, improving.  7.       Mild hypercalcemia  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  He will proceed with his third dose of atezolizumab and avastin, and see me again in three weeks. He will be restaged after the fourth cycle.  He does not want to have repeat labs drawn  "today, which is what I recommended.  He believes that this was probably a hemolyzed specimen.  I will therefore ask him to repeat his labs in 3-4 days at the [facility].    He was given a prescription for roxanol to take as needed should he experience abdominal pain.  Finally, I told him that if he cannot tolerate any more chemotherapy or if tehre is clear and unequivocal evidence of progression, I would recommend that he consider hospice.  His wife stated that he is "not ready to do that".  His multiple questions were answered to his satisfaction.      "

## 2018-04-27 ENCOUNTER — INFUSION (OUTPATIENT)
Dept: INFUSION THERAPY | Facility: HOSPITAL | Age: 68
End: 2018-04-27
Attending: INTERNAL MEDICINE
Payer: MEDICARE

## 2018-04-27 ENCOUNTER — OFFICE VISIT (OUTPATIENT)
Dept: HEMATOLOGY/ONCOLOGY | Facility: CLINIC | Age: 68
End: 2018-04-27
Payer: MEDICARE

## 2018-04-27 VITALS
SYSTOLIC BLOOD PRESSURE: 136 MMHG | TEMPERATURE: 98 F | HEIGHT: 71 IN | RESPIRATION RATE: 18 BRPM | DIASTOLIC BLOOD PRESSURE: 77 MMHG | WEIGHT: 175.06 LBS | BODY MASS INDEX: 24.51 KG/M2 | HEART RATE: 108 BPM

## 2018-04-27 VITALS
SYSTOLIC BLOOD PRESSURE: 138 MMHG | DIASTOLIC BLOOD PRESSURE: 78 MMHG | RESPIRATION RATE: 18 BRPM | BODY MASS INDEX: 24.41 KG/M2 | TEMPERATURE: 98 F | WEIGHT: 175.06 LBS | HEART RATE: 78 BPM

## 2018-04-27 DIAGNOSIS — C64.9 RENAL CELL CARCINOMA, UNSPECIFIED LATERALITY: Primary | ICD-10-CM

## 2018-04-27 DIAGNOSIS — C78.7 LIVER METASTASES: ICD-10-CM

## 2018-04-27 DIAGNOSIS — Z51.11 ENCOUNTER FOR ANTINEOPLASTIC CHEMOTHERAPY: ICD-10-CM

## 2018-04-27 DIAGNOSIS — D64.9 ANEMIA, UNSPECIFIED TYPE: ICD-10-CM

## 2018-04-27 DIAGNOSIS — D63.0 ANEMIA IN NEOPLASTIC DISEASE: ICD-10-CM

## 2018-04-27 DIAGNOSIS — C64.9 RENAL CELL CARCINOMA, UNSPECIFIED LATERALITY: ICD-10-CM

## 2018-04-27 DIAGNOSIS — Z51.11 ENCOUNTER FOR ANTINEOPLASTIC CHEMOTHERAPY: Primary | ICD-10-CM

## 2018-04-27 PROCEDURE — 96417 CHEMO IV INFUS EACH ADDL SEQ: CPT

## 2018-04-27 PROCEDURE — 25000003 PHARM REV CODE 250: Performed by: INTERNAL MEDICINE

## 2018-04-27 PROCEDURE — 99213 OFFICE O/P EST LOW 20 MIN: CPT | Mod: PBBFAC | Performed by: INTERNAL MEDICINE

## 2018-04-27 PROCEDURE — A4216 STERILE WATER/SALINE, 10 ML: HCPCS | Performed by: INTERNAL MEDICINE

## 2018-04-27 PROCEDURE — 99999 PR PBB SHADOW E&M-EST. PATIENT-LVL III: CPT | Mod: PBBFAC,,, | Performed by: INTERNAL MEDICINE

## 2018-04-27 PROCEDURE — 63600175 PHARM REV CODE 636 W HCPCS: Mod: JG | Performed by: INTERNAL MEDICINE

## 2018-04-27 PROCEDURE — 96413 CHEMO IV INFUSION 1 HR: CPT

## 2018-04-27 PROCEDURE — 99215 OFFICE O/P EST HI 40 MIN: CPT | Mod: 25,S$PBB,, | Performed by: INTERNAL MEDICINE

## 2018-04-27 RX ORDER — MORPHINE SULFATE 20 MG/ML
10 SOLUTION ORAL EVERY 4 HOURS PRN
Qty: 240 ML | Refills: 0 | Status: SHIPPED | OUTPATIENT
Start: 2018-04-27 | End: 2018-08-07

## 2018-04-27 RX ORDER — HEPARIN 100 UNIT/ML
500 SYRINGE INTRAVENOUS
Status: DISCONTINUED | OUTPATIENT
Start: 2018-04-27 | End: 2018-04-27 | Stop reason: HOSPADM

## 2018-04-27 RX ORDER — HEPARIN 100 UNIT/ML
500 SYRINGE INTRAVENOUS
Status: CANCELLED | OUTPATIENT
Start: 2018-04-27

## 2018-04-27 RX ORDER — SODIUM CHLORIDE 0.9 % (FLUSH) 0.9 %
10 SYRINGE (ML) INJECTION
Status: CANCELLED | OUTPATIENT
Start: 2018-04-27

## 2018-04-27 RX ORDER — SODIUM CHLORIDE 0.9 % (FLUSH) 0.9 %
10 SYRINGE (ML) INJECTION
Status: DISCONTINUED | OUTPATIENT
Start: 2018-04-27 | End: 2018-04-27 | Stop reason: HOSPADM

## 2018-04-27 RX ADMIN — SODIUM CHLORIDE, PRESERVATIVE FREE 10 ML: 5 INJECTION INTRAVENOUS at 04:04

## 2018-04-27 RX ADMIN — SODIUM CHLORIDE: 9 INJECTION, SOLUTION INTRAVENOUS at 02:04

## 2018-04-27 RX ADMIN — BEVACIZUMAB 1190 MG: 400 INJECTION, SOLUTION INTRAVENOUS at 03:04

## 2018-04-27 RX ADMIN — ATEZOLIZUMAB 1200 MG: 1200 INJECTION, SOLUTION INTRAVENOUS at 03:04

## 2018-04-27 RX ADMIN — HEPARIN 500 UNITS: 100 SYRINGE at 04:04

## 2018-04-27 NOTE — PLAN OF CARE
Problem: Patient Care Overview  Goal: Plan of Care Review  1630-Patient tolerated treatment well. Discharged without complaints or S/S of adverse event. AVS given.  Instructed to call provider for any questions or concerns.

## 2018-04-27 NOTE — Clinical Note
Please have a CMP drawn at the [facility] on Monday.  See me with labs for chemo in three weeks

## 2018-04-27 NOTE — PLAN OF CARE
Problem: Patient Care Overview  Goal: Individualization & Mutuality  Outcome: Ongoing (interventions implemented as appropriate)  1415-Labs , hx, and medications reviewed, patient was seen by MD prior to arrival. Assessment completed. Discussed plan of care with patient. Patient in agreement. Chair reclined and warm blanket and snack offered.

## 2018-04-30 ENCOUNTER — LAB VISIT (OUTPATIENT)
Dept: LAB | Facility: HOSPITAL | Age: 68
End: 2018-04-30
Attending: INTERNAL MEDICINE
Payer: MEDICARE

## 2018-04-30 DIAGNOSIS — Z51.11 ENCOUNTER FOR ANTINEOPLASTIC CHEMOTHERAPY: ICD-10-CM

## 2018-04-30 DIAGNOSIS — D64.9 ANEMIA, UNSPECIFIED TYPE: ICD-10-CM

## 2018-04-30 DIAGNOSIS — E83.52 HYPERCALCEMIA: ICD-10-CM

## 2018-04-30 DIAGNOSIS — C78.7 LIVER METASTASES: ICD-10-CM

## 2018-04-30 LAB
ALBUMIN SERPL BCP-MCNC: 2.2 G/DL
ALP SERPL-CCNC: 382 U/L
ALT SERPL W/O P-5'-P-CCNC: 19 U/L
ANION GAP SERPL CALC-SCNC: 13 MMOL/L
AST SERPL-CCNC: 78 U/L
BILIRUB SERPL-MCNC: 0.5 MG/DL
BUN SERPL-MCNC: 19 MG/DL
CALCIUM SERPL-MCNC: 10.9 MG/DL
CHLORIDE SERPL-SCNC: 104 MMOL/L
CO2 SERPL-SCNC: 21 MMOL/L
CREAT SERPL-MCNC: 1.4 MG/DL
EST. GFR  (AFRICAN AMERICAN): 60 ML/MIN/1.73 M^2
EST. GFR  (NON AFRICAN AMERICAN): 52 ML/MIN/1.73 M^2
GLUCOSE SERPL-MCNC: 124 MG/DL
POTASSIUM SERPL-SCNC: 4.8 MMOL/L
PROT SERPL-MCNC: 7.4 G/DL
SODIUM SERPL-SCNC: 138 MMOL/L

## 2018-04-30 PROCEDURE — 36415 COLL VENOUS BLD VENIPUNCTURE: CPT | Mod: PO

## 2018-04-30 PROCEDURE — 80053 COMPREHEN METABOLIC PANEL: CPT | Mod: PO

## 2018-04-30 RX ORDER — SODIUM CHLORIDE 0.9 % (FLUSH) 0.9 %
10 SYRINGE (ML) INJECTION
Status: CANCELLED | OUTPATIENT
Start: 2018-05-04

## 2018-04-30 RX ORDER — HEPARIN 100 UNIT/ML
500 SYRINGE INTRAVENOUS
Status: CANCELLED | OUTPATIENT
Start: 2018-05-04

## 2018-04-30 NOTE — PROGRESS NOTES
Repeat calcium today was 10.9 mg/dl.  Albumin is 2.2 gr/dl.  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

## 2018-05-01 ENCOUNTER — TELEPHONE (OUTPATIENT)
Dept: HEMATOLOGY/ONCOLOGY | Facility: CLINIC | Age: 68
End: 2018-05-01

## 2018-05-02 DIAGNOSIS — E83.52 HYPERCALCEMIA OF MALIGNANCY: Primary | ICD-10-CM

## 2018-05-02 NOTE — PROGRESS NOTES
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  I explained to him that the incidence of nephrotoxicity is rate-dependent and typically we prolong the infusion to 30 minutes.  I also told him that Xgeva can be used instead, which is not nephrotoxic.  Dr. Russo would like to try vigorous hydration at home and have his calcium level repeated in a few days.  I have instructed him to drink an 8 ounce glass of water every hour on the hour when awake.  We will repeat his CMP on 5/4/2018.  He voiced udnerstanding

## 2018-05-04 ENCOUNTER — DOCUMENTATION ONLY (OUTPATIENT)
Dept: HEMATOLOGY/ONCOLOGY | Facility: CLINIC | Age: 68
End: 2018-05-04

## 2018-05-04 ENCOUNTER — TELEPHONE (OUTPATIENT)
Dept: PHARMACY | Facility: AMBULARY SURGERY CENTER | Age: 68
End: 2018-05-04

## 2018-05-04 ENCOUNTER — INFUSION (OUTPATIENT)
Dept: INFUSION THERAPY | Facility: HOSPITAL | Age: 68
End: 2018-05-04
Attending: INTERNAL MEDICINE
Payer: MEDICARE

## 2018-05-04 VITALS
HEART RATE: 115 BPM | SYSTOLIC BLOOD PRESSURE: 133 MMHG | RESPIRATION RATE: 20 BRPM | HEIGHT: 71 IN | BODY MASS INDEX: 24.01 KG/M2 | WEIGHT: 171.5 LBS | DIASTOLIC BLOOD PRESSURE: 96 MMHG

## 2018-05-04 DIAGNOSIS — E83.52 HYPERCALCEMIA: Primary | ICD-10-CM

## 2018-05-04 DIAGNOSIS — E86.0 DEHYDRATION: ICD-10-CM

## 2018-05-04 PROCEDURE — 63600175 PHARM REV CODE 636 W HCPCS: Mod: PN | Performed by: INTERNAL MEDICINE

## 2018-05-04 PROCEDURE — 25000003 PHARM REV CODE 250: Mod: PN | Performed by: INTERNAL MEDICINE

## 2018-05-04 PROCEDURE — 96365 THER/PROPH/DIAG IV INF INIT: CPT | Mod: PN

## 2018-05-04 RX ORDER — HEPARIN 100 UNIT/ML
500 SYRINGE INTRAVENOUS
Status: DISCONTINUED | OUTPATIENT
Start: 2018-05-04 | End: 2018-05-04 | Stop reason: HOSPADM

## 2018-05-04 RX ORDER — SODIUM CHLORIDE 0.9 % (FLUSH) 0.9 %
10 SYRINGE (ML) INJECTION
Status: DISCONTINUED | OUTPATIENT
Start: 2018-05-04 | End: 2018-05-04 | Stop reason: HOSPADM

## 2018-05-04 RX ADMIN — HEPARIN 500 UNITS: 100 SYRINGE at 05:05

## 2018-05-04 RX ADMIN — ZOLEDRONIC ACID 3.5 MG: 4 INJECTION, SOLUTION, CONCENTRATE INTRAVENOUS at 05:05

## 2018-05-04 NOTE — PROGRESS NOTES
Calcium remains elevated today; it is 10.8 mg/dl with an albumin of 2.1gr/dl.  I called Dr. Russo; he would like to continue oral hydration at home over the weekend and tentatively have labs rechecked at the [facility] on 5/7.  If his calcium remains elevated, he will receive zometa at the [facility] on 5/7.

## 2018-05-07 ENCOUNTER — LAB VISIT (OUTPATIENT)
Dept: LAB | Facility: HOSPITAL | Age: 68
End: 2018-05-07
Attending: INTERNAL MEDICINE
Payer: MEDICARE

## 2018-05-07 ENCOUNTER — TELEPHONE (OUTPATIENT)
Dept: HEMATOLOGY/ONCOLOGY | Facility: CLINIC | Age: 68
End: 2018-05-07

## 2018-05-07 ENCOUNTER — DOCUMENTATION ONLY (OUTPATIENT)
Dept: HEMATOLOGY/ONCOLOGY | Facility: CLINIC | Age: 68
End: 2018-05-07

## 2018-05-07 DIAGNOSIS — Z51.11 ENCOUNTER FOR ANTINEOPLASTIC CHEMOTHERAPY: ICD-10-CM

## 2018-05-07 DIAGNOSIS — Z51.11 ENCOUNTER FOR ANTINEOPLASTIC CHEMOTHERAPY: Primary | ICD-10-CM

## 2018-05-07 DIAGNOSIS — R64 CACHEXIA: Primary | ICD-10-CM

## 2018-05-07 DIAGNOSIS — E83.52 HYPERCALCEMIA: ICD-10-CM

## 2018-05-07 LAB
ALBUMIN SERPL BCP-MCNC: 1.9 G/DL
ALP SERPL-CCNC: 353 U/L
ALT SERPL W/O P-5'-P-CCNC: 19 U/L
ANION GAP SERPL CALC-SCNC: 15 MMOL/L
AST SERPL-CCNC: 74 U/L
BILIRUB SERPL-MCNC: 0.5 MG/DL
BUN SERPL-MCNC: 21 MG/DL
CALCIUM SERPL-MCNC: 9.7 MG/DL
CHLORIDE SERPL-SCNC: 106 MMOL/L
CO2 SERPL-SCNC: 19 MMOL/L
CREAT SERPL-MCNC: 1.3 MG/DL
ERYTHROCYTE [DISTWIDTH] IN BLOOD BY AUTOMATED COUNT: 21.9 %
EST. GFR  (AFRICAN AMERICAN): >60 ML/MIN/1.73 M^2
EST. GFR  (NON AFRICAN AMERICAN): 56 ML/MIN/1.73 M^2
GLUCOSE SERPL-MCNC: 127 MG/DL
HCT VFR BLD AUTO: 38.2 %
HGB BLD-MCNC: 12.1 G/DL
MCH RBC QN AUTO: 26.6 PG
MCHC RBC AUTO-ENTMCNC: 31.7 G/DL
MCV RBC AUTO: 84 FL
NEUTROPHILS # BLD AUTO: 9.4 K/UL
PLATELET # BLD AUTO: 332 K/UL
PMV BLD AUTO: 9.1 FL
POTASSIUM SERPL-SCNC: 4.6 MMOL/L
PROT SERPL-MCNC: 7.1 G/DL
RBC # BLD AUTO: 4.55 M/UL
SODIUM SERPL-SCNC: 140 MMOL/L
WBC # BLD AUTO: 14.31 K/UL

## 2018-05-07 PROCEDURE — 80053 COMPREHEN METABOLIC PANEL: CPT | Mod: PO

## 2018-05-07 PROCEDURE — 36415 COLL VENOUS BLD VENIPUNCTURE: CPT | Mod: PO

## 2018-05-07 PROCEDURE — 85027 COMPLETE CBC AUTOMATED: CPT | Mod: PO

## 2018-05-07 RX ORDER — DRONABINOL 2.5 MG/1
2.5 CAPSULE ORAL
Qty: 60 CAPSULE | Refills: 1 | Status: SHIPPED | OUTPATIENT
Start: 2018-05-07 | End: 2018-08-07

## 2018-05-07 NOTE — PROGRESS NOTES
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  Today his calcium was 9.1 mg/dl with an albumin of 1.9 gr/dl.  Corrected calcium is 10.7 mg/dl.  His creatinine is 1.3 mg/dl.  We will repeat his labs in 7 days..  Patient was notified.

## 2018-05-16 NOTE — PROGRESS NOTES
Subjective:       Patient ID: Bud Russo III is a 67 y.o. male.    Chief Complaint: No chief complaint on file.    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  He is due for his next cycle of avastin and atezolizumab.  Apparently he did not tolerate the dronabinol and he is back on megace.  He is taking a cannabis derivative [place] from [place], and he states that his pain is very well controlled.    His CBC shows a WBC count of 16,200 /mm3, Hg 11.5 gr/dl, Ht 36.9 % and platelets 356,000 /mm3, with an MCV of 86.  His creatinine is 1.3 mg/dl,  and and his bilirubin is 0.7 mg/dl.  Alkaline phosphatase is 443 U/L, ASt is 65 and ALT is 17 U/L.   His K is 5.2 mEq/L.  Calcium is 10.0 mg/dl while his albumin is 1.8 gr/dl.    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  His diagnosis was established on April 30th when he presented to an Emergency Room in [place], [place], where he lived with left flank pain.  Radiologic studies showed a mass in his left kidney.  Subsequently, he went to [place], [place], where he was staged and was found to have a mass in his liver.  On 05/23/2017, he   underwent an embolization of his tumor in preparation for a possible liver resection; and on 06/06/2017, he underwent a left nephrectomy.  He remained in the [place] area in preparation of a liver resection; however, an MRI of his abdomen and pelvis that was done showed that there was significant progression of the metastatic liver disease with interval increase in size of a dominant right hepatic lobe mass from 6.1 cm previously to 11.2 cm with evidence of internal necrosis and peripheral enhancement.  In addition, multiple new lesions were identified in both hepatic lobes, measuring up to 1.9 cm in the left lateral lobe and 1.2 cm in the right hepatic lobe.  The right portal vein was occluded as a result of the recent portal vein embolization  procedure he had undergone.  The patient met with an oncologist at [place], and he was offered chemotherapy.  He had decided to return to [place] and receive chemotherapy at our facility.  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  He started chemotherapy several months ago, and completed three cycles.   Unfortunately the staging MRI scan showed that compared to his prior CT of July 19, 2017 there has been an interval increase in number and size of the foci of hepatic metastatic disease within the liver, hence the initiation of second line treatment with the combination of lenvatinib plus afinitor, however, he again progressed with evidence of peritoneal disease, hence the initiation of his third line regimen.  He is due for cycle 4 today.    Dr. Russo has stopped taking his oxycodone due to side effects (sleepiness, constipation).  As mentioned above, he is getting a cannabis product from [place] and his pain is well controlled.    Review of Systems      Overall he feels fair.  He states that his appetite is still diminished.   He no longer has constipation, and he has essentially weaned himself off the oxycodone.  He has not had any fever.  He complains of fatigue and generalized weakness, and he is here on a wheelchair today.   He denies any anxiety, depression, easy bruising, fevers, chills, night  sweats, vomiting, diarrhea, diplopia, blurred vision, headache, chest pain, palpitations, shortness of breath, breast pain, extremity pain, or difficulty ambulating.  He does experience intermittent abdominal pain.  The remainder of the ten-point ROS, including general, skin, lymph, H/N, cardiorespiratory, GI, , Neuro, Endocrine, and psychiatric is negative.     Objective:      Physical Exam    He is alert, oriented to time, place, person, pleasant, well      nourished,  in no acute physical distress.  He is here with his wife.                                  VITAL SIGNS:  Reviewed                                      HEENT:  Normal.  There are no nasal, oral, lip, gingival, auricular, lid,    or conjunctival lesions.  Mucosae are moist and pink, and there is no        thrush.  Pupils are equal, reactive to light and accommodation.              Extraocular muscle movements are intact.     Dentition is good.                                  NECK:  Supple without JVD, adenopathy, or thyromegaly.                       LUNGS:  Clear to auscultation without wheezing, rales, or rhonchi.           CARDIOVASCULAR:  Reveals an S1, S2, no murmurs, no rubs, no gallops.         ABDOMEN:  Soft, nontender, without organomegaly.  Bowel sounds are  present.  A scar from his left nephrectomy is identified.     Scars from bilateral varicocele surgeries are identified.                                                                 EXTREMITIES:  No cyanosis, clubbing, or edema.     A right subclavian port is now identified.                                                            LYMPHATIC:  There is no cervical, axillary, or supraclavicular adenopathy.   SKIN:  Warm and moist, without petechiae, rashes, induration, or ecchymoses.           NEUROLOGIC:  DTRs are 0-1+ bilaterally, symmetrical, motor function is 5/5,  and cranial nerves are  within normal limits.  Both fundi are sharp.    Assessment:       1. Renal cell carcinoma of kidney excluding renal pelvis    2. CRF, imrpoved    3. Liver metastases    4. Encounter for antineoplastic chemotherapy     5.     hyperkalemia  6.      Increased transaminases, improving.  7.       Mild hypercalcemia  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  He will proceed with his fourth dose of atezolizumab and avastin, and see me again in three weeks. He will have a repeat CMP at the [facility] in a week.  Finally, he stated that in the  absence of clinical deterioration, he does NOT want to get any more scans.  RTC 3 weeks  His multiple questions were answered to his satisfaction.

## 2018-05-18 ENCOUNTER — OFFICE VISIT (OUTPATIENT)
Dept: HEMATOLOGY/ONCOLOGY | Facility: CLINIC | Age: 68
End: 2018-05-18
Payer: MEDICARE

## 2018-05-18 ENCOUNTER — INFUSION (OUTPATIENT)
Dept: INFUSION THERAPY | Facility: HOSPITAL | Age: 68
End: 2018-05-18
Attending: INTERNAL MEDICINE
Payer: MEDICARE

## 2018-05-18 VITALS
SYSTOLIC BLOOD PRESSURE: 131 MMHG | OXYGEN SATURATION: 97 % | BODY MASS INDEX: 24.47 KG/M2 | HEIGHT: 71 IN | WEIGHT: 174.81 LBS | HEART RATE: 121 BPM | RESPIRATION RATE: 20 BRPM | TEMPERATURE: 98 F | DIASTOLIC BLOOD PRESSURE: 79 MMHG

## 2018-05-18 VITALS — DIASTOLIC BLOOD PRESSURE: 73 MMHG | SYSTOLIC BLOOD PRESSURE: 127 MMHG | RESPIRATION RATE: 18 BRPM | HEART RATE: 117 BPM

## 2018-05-18 DIAGNOSIS — C64.9 RENAL CELL CARCINOMA, UNSPECIFIED LATERALITY: Primary | ICD-10-CM

## 2018-05-18 DIAGNOSIS — Z51.11 ENCOUNTER FOR ANTINEOPLASTIC CHEMOTHERAPY: ICD-10-CM

## 2018-05-18 DIAGNOSIS — C78.7 LIVER METASTASES: Primary | ICD-10-CM

## 2018-05-18 DIAGNOSIS — E83.52 HYPERCALCEMIA: ICD-10-CM

## 2018-05-18 DIAGNOSIS — C64.9 RENAL CELL CARCINOMA, UNSPECIFIED LATERALITY: ICD-10-CM

## 2018-05-18 DIAGNOSIS — C78.7 LIVER METASTASES: ICD-10-CM

## 2018-05-18 PROCEDURE — 96417 CHEMO IV INFUS EACH ADDL SEQ: CPT

## 2018-05-18 PROCEDURE — 99214 OFFICE O/P EST MOD 30 MIN: CPT | Mod: PBBFAC,25 | Performed by: INTERNAL MEDICINE

## 2018-05-18 PROCEDURE — 99215 OFFICE O/P EST HI 40 MIN: CPT | Mod: 25,S$PBB,, | Performed by: INTERNAL MEDICINE

## 2018-05-18 PROCEDURE — 96413 CHEMO IV INFUSION 1 HR: CPT

## 2018-05-18 PROCEDURE — 63600175 PHARM REV CODE 636 W HCPCS: Mod: JG | Performed by: INTERNAL MEDICINE

## 2018-05-18 PROCEDURE — A4216 STERILE WATER/SALINE, 10 ML: HCPCS | Performed by: INTERNAL MEDICINE

## 2018-05-18 PROCEDURE — 25000003 PHARM REV CODE 250: Performed by: INTERNAL MEDICINE

## 2018-05-18 PROCEDURE — 99999 PR PBB SHADOW E&M-EST. PATIENT-LVL IV: CPT | Mod: PBBFAC,,, | Performed by: INTERNAL MEDICINE

## 2018-05-18 RX ORDER — HEPARIN 100 UNIT/ML
500 SYRINGE INTRAVENOUS
Status: CANCELLED | OUTPATIENT
Start: 2018-05-18

## 2018-05-18 RX ORDER — SODIUM CHLORIDE 0.9 % (FLUSH) 0.9 %
10 SYRINGE (ML) INJECTION
Status: CANCELLED | OUTPATIENT
Start: 2018-05-18

## 2018-05-18 RX ORDER — HEPARIN 100 UNIT/ML
500 SYRINGE INTRAVENOUS
Status: DISCONTINUED | OUTPATIENT
Start: 2018-05-18 | End: 2018-05-18 | Stop reason: HOSPADM

## 2018-05-18 RX ORDER — SODIUM CHLORIDE 0.9 % (FLUSH) 0.9 %
10 SYRINGE (ML) INJECTION
Status: DISCONTINUED | OUTPATIENT
Start: 2018-05-18 | End: 2018-05-18 | Stop reason: HOSPADM

## 2018-05-18 RX ADMIN — ATEZOLIZUMAB 1200 MG: 1200 INJECTION, SOLUTION INTRAVENOUS at 05:05

## 2018-05-18 RX ADMIN — HEPARIN 500 UNITS: 100 SYRINGE at 05:05

## 2018-05-18 RX ADMIN — SODIUM CHLORIDE: 9 INJECTION, SOLUTION INTRAVENOUS at 03:05

## 2018-05-18 RX ADMIN — SODIUM CHLORIDE, PRESERVATIVE FREE 10 ML: 5 INJECTION INTRAVENOUS at 05:05

## 2018-05-18 RX ADMIN — BEVACIZUMAB 1190 MG: 400 INJECTION, SOLUTION INTRAVENOUS at 03:05

## 2018-05-18 NOTE — Clinical Note
See me in three weeks with CBC and CMP.  Needs a CMP at the [facility] next Friday please.

## 2018-05-19 NOTE — PLAN OF CARE
Problem: Patient Care Overview  Goal: Individualization & Mutuality  Outcome: Ongoing (interventions implemented as appropriate)  1430-Labs , hx, and medications reviewed, patient was seen by Md prior to arrival. Assessment completed. Discussed plan of care with patient. Patient in agreement. Chair reclined and warm blanket and snack offered.

## 2018-05-19 NOTE — PLAN OF CARE
Problem: Patient Care Overview  Goal: Plan of Care Review  3352-Patient tolerated treatment well. Discharged without complaints or S/S of adverse event. AVS given.  Instructed to call provider for any questions or concerns.

## 2018-06-08 ENCOUNTER — INFUSION (OUTPATIENT)
Dept: INFUSION THERAPY | Facility: HOSPITAL | Age: 68
End: 2018-06-08
Attending: INTERNAL MEDICINE
Payer: MEDICARE

## 2018-06-08 ENCOUNTER — LAB VISIT (OUTPATIENT)
Dept: LAB | Facility: HOSPITAL | Age: 68
End: 2018-06-08
Attending: INTERNAL MEDICINE
Payer: MEDICARE

## 2018-06-08 ENCOUNTER — OFFICE VISIT (OUTPATIENT)
Dept: HEMATOLOGY/ONCOLOGY | Facility: CLINIC | Age: 68
End: 2018-06-08
Payer: MEDICARE

## 2018-06-08 VITALS
OXYGEN SATURATION: 98 % | BODY MASS INDEX: 24.08 KG/M2 | HEART RATE: 127 BPM | WEIGHT: 172.63 LBS | TEMPERATURE: 98 F | SYSTOLIC BLOOD PRESSURE: 111 MMHG | RESPIRATION RATE: 16 BRPM | DIASTOLIC BLOOD PRESSURE: 71 MMHG

## 2018-06-08 DIAGNOSIS — Z51.11 ENCOUNTER FOR ANTINEOPLASTIC CHEMOTHERAPY: ICD-10-CM

## 2018-06-08 DIAGNOSIS — C78.7 LIVER METASTASES: ICD-10-CM

## 2018-06-08 DIAGNOSIS — C64.2 RENAL CELL CARCINOMA OF LEFT KIDNEY: ICD-10-CM

## 2018-06-08 DIAGNOSIS — D64.9 ANEMIA, UNSPECIFIED TYPE: ICD-10-CM

## 2018-06-08 DIAGNOSIS — E86.0 DEHYDRATION: ICD-10-CM

## 2018-06-08 DIAGNOSIS — C64.9 RENAL CELL CARCINOMA, UNSPECIFIED LATERALITY: Primary | ICD-10-CM

## 2018-06-08 DIAGNOSIS — E83.52 HYPERCALCEMIA: ICD-10-CM

## 2018-06-08 DIAGNOSIS — C78.7 LIVER METASTASES: Primary | ICD-10-CM

## 2018-06-08 LAB
ABO + RH BLD: NORMAL
ALBUMIN SERPL BCP-MCNC: 1.6 G/DL
ALP SERPL-CCNC: 478 U/L
ALT SERPL W/O P-5'-P-CCNC: 20 U/L
ANION GAP SERPL CALC-SCNC: 17 MMOL/L
AST SERPL-CCNC: 120 U/L
BILIRUB SERPL-MCNC: 0.8 MG/DL
BLD GP AB SCN CELLS X3 SERPL QL: NORMAL
BUN SERPL-MCNC: 25 MG/DL
CALCIUM SERPL-MCNC: 10.8 MG/DL
CHLORIDE SERPL-SCNC: 101 MMOL/L
CO2 SERPL-SCNC: 17 MMOL/L
CREAT SERPL-MCNC: 1.2 MG/DL
ERYTHROCYTE [DISTWIDTH] IN BLOOD BY AUTOMATED COUNT: 20.2 %
EST. GFR  (AFRICAN AMERICAN): >60 ML/MIN/1.73 M^2
EST. GFR  (NON AFRICAN AMERICAN): >60 ML/MIN/1.73 M^2
GLUCOSE SERPL-MCNC: 111 MG/DL
HCT VFR BLD AUTO: 34 %
HGB BLD-MCNC: 10.3 G/DL
IMM GRANULOCYTES # BLD AUTO: 0.51 K/UL
MCH RBC QN AUTO: 27.8 PG
MCHC RBC AUTO-ENTMCNC: 30.3 G/DL
MCV RBC AUTO: 92 FL
NEUTROPHILS # BLD AUTO: 10 K/UL
PLATELET # BLD AUTO: 377 K/UL
PMV BLD AUTO: 9.2 FL
POTASSIUM SERPL-SCNC: 5 MMOL/L
PROT SERPL-MCNC: 7.2 G/DL
RBC # BLD AUTO: 3.71 M/UL
SODIUM SERPL-SCNC: 135 MMOL/L
WBC # BLD AUTO: 15.54 K/UL

## 2018-06-08 PROCEDURE — 96413 CHEMO IV INFUSION 1 HR: CPT

## 2018-06-08 PROCEDURE — 86901 BLOOD TYPING SEROLOGIC RH(D): CPT

## 2018-06-08 PROCEDURE — 99215 OFFICE O/P EST HI 40 MIN: CPT | Mod: S$PBB,,, | Performed by: INTERNAL MEDICINE

## 2018-06-08 PROCEDURE — 36415 COLL VENOUS BLD VENIPUNCTURE: CPT

## 2018-06-08 PROCEDURE — A4216 STERILE WATER/SALINE, 10 ML: HCPCS | Performed by: INTERNAL MEDICINE

## 2018-06-08 PROCEDURE — 96415 CHEMO IV INFUSION ADDL HR: CPT

## 2018-06-08 PROCEDURE — 85027 COMPLETE CBC AUTOMATED: CPT

## 2018-06-08 PROCEDURE — 80053 COMPREHEN METABOLIC PANEL: CPT

## 2018-06-08 PROCEDURE — 25000003 PHARM REV CODE 250: Performed by: INTERNAL MEDICINE

## 2018-06-08 PROCEDURE — 96367 TX/PROPH/DG ADDL SEQ IV INF: CPT

## 2018-06-08 PROCEDURE — 96417 CHEMO IV INFUS EACH ADDL SEQ: CPT

## 2018-06-08 PROCEDURE — 99999 PR PBB SHADOW E&M-EST. PATIENT-LVL IV: CPT | Mod: PBBFAC,,, | Performed by: INTERNAL MEDICINE

## 2018-06-08 PROCEDURE — 63600175 PHARM REV CODE 636 W HCPCS: Mod: JW,JG | Performed by: INTERNAL MEDICINE

## 2018-06-08 PROCEDURE — 99214 OFFICE O/P EST MOD 30 MIN: CPT | Mod: PBBFAC,25 | Performed by: INTERNAL MEDICINE

## 2018-06-08 RX ORDER — SODIUM CHLORIDE 0.9 % (FLUSH) 0.9 %
10 SYRINGE (ML) INJECTION
Status: DISCONTINUED | OUTPATIENT
Start: 2018-06-08 | End: 2018-06-08 | Stop reason: HOSPADM

## 2018-06-08 RX ORDER — HEPARIN 100 UNIT/ML
500 SYRINGE INTRAVENOUS
Status: CANCELLED | OUTPATIENT
Start: 2018-06-11

## 2018-06-08 RX ORDER — HEPARIN 100 UNIT/ML
500 SYRINGE INTRAVENOUS
Status: DISCONTINUED | OUTPATIENT
Start: 2018-06-08 | End: 2018-06-08 | Stop reason: HOSPADM

## 2018-06-08 RX ORDER — SODIUM CHLORIDE 0.9 % (FLUSH) 0.9 %
10 SYRINGE (ML) INJECTION
Status: CANCELLED | OUTPATIENT
Start: 2018-06-08

## 2018-06-08 RX ORDER — SODIUM CHLORIDE 0.9 % (FLUSH) 0.9 %
10 SYRINGE (ML) INJECTION
Status: CANCELLED | OUTPATIENT
Start: 2018-06-11

## 2018-06-08 RX ORDER — HEPARIN 100 UNIT/ML
500 SYRINGE INTRAVENOUS
Status: CANCELLED | OUTPATIENT
Start: 2018-06-08

## 2018-06-08 RX ADMIN — SODIUM CHLORIDE: 9 INJECTION, SOLUTION INTRAVENOUS at 02:06

## 2018-06-08 RX ADMIN — ZOLEDRONIC ACID 4 MG: 4 INJECTION, SOLUTION, CONCENTRATE INTRAVENOUS at 04:06

## 2018-06-08 RX ADMIN — HEPARIN 500 UNITS: 100 SYRINGE at 04:06

## 2018-06-08 RX ADMIN — BEVACIZUMAB 1190 MG: 400 INJECTION, SOLUTION INTRAVENOUS at 02:06

## 2018-06-08 RX ADMIN — ATEZOLIZUMAB 1200 MG: 1200 INJECTION, SOLUTION INTRAVENOUS at 03:06

## 2018-06-08 RX ADMIN — SODIUM CHLORIDE, PRESERVATIVE FREE 10 ML: 5 INJECTION INTRAVENOUS at 04:06

## 2018-06-08 NOTE — PLAN OF CARE
Problem: Chemotherapy Effects (Adult)  Goal: Signs and Symptoms of Listed Potential Problems Will be Absent, Minimized or Managed (Chemotherapy Effects)  Signs and symptoms of listed potential problems will be absent, minimized or managed by discharge/transition of care (reference Chemotherapy Effects (Adult) CPG).   Outcome: Ongoing (interventions implemented as appropriate)  Pt here for D1C5 Avastin/tecentriq/zometa. VSS. No complaints voiced. Consent/labs/meds/allergies reviewed. PAC accessed with blood return noted. All questions answered. Will continue to monitor.

## 2018-06-08 NOTE — PROGRESS NOTES
Subjective:       Patient ID: Bud Russo III is a 67 y.o. male.    Chief Complaint: No chief complaint on file.    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  He is due for his next cycle of avastin and atezolizumab.   He is taking a cannabis derivative, and he states that his pain is very well controlled and he no longer experiences constipation.    His CBC shows a WBC count of 16,200 /mm3, Hg 11.5 gr/dl, Ht 36.9 % and platelets 356,000 /mm3, with an MCV of 86.  His creatinine is 1.2 mg/dl,  and and his bilirubin is 0.8 mg/dl.  Alkaline phosphatase is 478 U/L, AST is 120 and ALT is 20 U/L.     Calcium is 10.8 mg/dl while his albumin is 1.6 gr/dl.    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  His diagnosis was established on April 30th when he presented to an Emergency Room in [place], [place], where he lived with left flank pain.  Radiologic studies showed a mass in his left kidney.  Subsequently, he went to [place], [place], where he was staged and was found to have a mass in his liver.  On 05/23/2017, he   underwent an embolization of his tumor in preparation for a possible liver resection; and on 06/06/2017, he underwent a left nephrectomy.  He remained in the [place] area in preparation of a liver resection; however, an MRI of his abdomen and pelvis that was done showed that there was significant progression of the metastatic liver disease with interval increase in size of a dominant right hepatic lobe mass from 6.1 cm previously to 11.2 cm with evidence of internal necrosis and peripheral enhancement.  In addition, multiple new lesions were identified in both hepatic lobes, measuring up to 1.9 cm in the left lateral lobe and 1.2 cm in the right hepatic lobe.  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  The patient met with an oncologist at [facility], and he  was offered chemotherapy.  He had decided to return to [place] and receive chemotherapy at our facility.  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  He started chemotherapy several months ago, and completed three cycles.   Unfortunately the staging MRI scan showed that compared to his prior CT of July 19, 2017 there has been an interval increase in number and size of the foci of hepatic metastatic disease within the liver, hence the initiation of second line treatment with the combination of lenvatinib plus afinitor, however, he again progressed with evidence of peritoneal disease, hence the initiation of his third line regimen.  He is due for cycle 5 today.    Dr. Russo has stopped taking his oxycodone due to side effects (sleepiness, constipation).  As mentioned above, he is getting a cannabis product and his pain is well controlled.    Review of Systems      Overall he feels fair.  He states that his appetite is still diminished.   He no longer has constipation, and he has essentially weaned himself off the oxycodone and is using THC derivatives for pain control.   He has not had any fever.  He complains of fatigue and generalized weakness, and he is here on a wheelchair today.  His ECOG PS is 2.  He did not want to climb on the examination table.  He denies any anxiety, depression, easy bruising, fevers, chills, night  sweats, vomiting, diarrhea, diplopia, blurred vision, headache, chest pain, palpitations, shortness of breath, breast pain, extremity pain, but he does have difficulty ambulating due to his weakness.   The remainder of the ten-point ROS, including general, skin, lymph, H/N, cardiorespiratory, GI, , Neuro, Endocrine, and psychiatric is negative.     Objective:      Physical Exam    He is alert, oriented to time, place, person, pleasant, well      nourished, in  no acute physical distress.  He is here with his wife.                                  VITAL SIGNS:  Reviewed                                      HEENT:  Normal.  There are no nasal, oral, lip, gingival, auricular, lid,    or conjunctival lesions.  Mucosae are moist and pink, and there is no        thrush.  Pupils are equal, reactive to light and accommodation.              Extraocular muscle movements are intact.     Dentition is good.                                  NECK:  Supple without JVD, adenopathy, or thyromegaly.                       LUNGS:  Clear to auscultation without wheezing, rales, or rhonchi.           CARDIOVASCULAR:  Reveals an S1, S2, no murmurs, no rubs, no gallops.         ABDOMEN:  Soft, nontender, without organomegaly.  Bowel sounds are  present.  A scar from his left nephrectomy is identified.     Scars from bilateral varicocele surgeries are identified.                                                                 EXTREMITIES:  No cyanosis, clubbing, or edema.     A right subclavian port is now identified.                                                            LYMPHATIC:  There is no cervical, axillary, or supraclavicular adenopathy.   SKIN:  Warm and moist, without petechiae, rashes, induration, or ecchymoses.           NEUROLOGIC:  DTRs are 0-1+ bilaterally, symmetrical, motor function is 5/5,  and cranial nerves are  within normal limits.  Both fundi are sharp.    Assessment:       1. Renal cell carcinoma of kidney excluding renal pelvis    2. CRF, imrpoved    3. Liver metastases    4. Encounter for antineoplastic chemotherapy     5.     Hypercalcemia  6.      Increased transaminases.    Plan:        I had a long and difficult discussion with Dr. Russo and his wife.  He will proceed with his next dose of atezolizumab and avastin, and see me again in 20 days.  I have explained to him that unless his PS improves, I will not be administering more chemotherapy in the future.   I  brought up the issue of hospice but he does not want to consider it yet.    In regards to his hypercalcemia we will administer zometa today, and I asked him to have a repeat calcium level at the [facility] in 5 days.   Finally, he stated that he wants to be DNR.  RTC 20 days with repeat labs.  His multiple questions were answered to his satisfaction.

## 2018-06-08 NOTE — Clinical Note
Needs zometa today in addition to his chemo.  Needs a CMP on Tuesday at the [facility].  He shoudl see me on June 28th, which is a day early, with labs for chemo one day early

## 2018-06-25 NOTE — PROGRESS NOTES
LMTC to go over lab results   Subjective:       Patient ID: Bud Russo III is a 67 y.o. male.    Chief Complaint: No chief complaint on file.    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  He is due for his next cycle of avastin and atezolizumab.   He is taking a cannabis derivative, and he states that his pain is well controlled and he no longer experiences constipation.    His CBC shows a WBC count of 19,100 /mm3, Hg 9.3 gr/dl, Ht 31.5 % and platelets 477,000 /mm3, with an MCV of 94.  His creatinine is 1.2 mg/dl, with a BUN of 26, while his bilirubin is 0.8 mg/dl.  Alkaline phosphatase is 664 U/L, AST is 144 and ALT is 23 U/L.   Calcium is 9.6 mg/dl while his albumin is 1.4 gr/dl.    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  His diagnosis was established on April 30th when he presented to an Emergency Room in [place], [place], where he lived with left flank pain.  Radiologic studies showed a mass in his left kidney.  Subsequently, he went to [place], [place], where he was staged and was found to have a mass in his liver.  On 05/23/2017, he   underwent an embolization of his tumor in preparation for a possible liver resection; and on 06/06/2017, he underwent a left nephrectomy.  He remained in the [place] area in preparation of a liver resection; however, an MRI of his abdomen and pelvis that was done showed that there was significant progression of the metastatic liver disease with interval increase in size of a dominant right hepatic lobe mass from 6.1 cm previously to 11.2 cm with evidence of internal necrosis and peripheral enhancement.  In addition, multiple new lesions were identified in both hepatic lobes, measuring up to 1.9 cm in the left lateral lobe and 1.2 cm in the right hepatic lobe.  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  The patient met with an oncologist at [facility], and  he was offered chemotherapy.  He had decided to return to [place] and receive chemotherapy at our facility.  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  He started chemotherapy several months ago, and completed three cycles.   Unfortunately the staging MRI scan showed that compared to his prior CT of July 19, 2017 there has been an interval increase in number and size of the foci of hepatic metastatic disease within the liver, hence the initiation of second line treatment with the combination of lenvatinib plus afinitor, however, he again progressed with evidence of peritoneal disease, hence the initiation of his third line regimen.  He is due for cycle 5 today.    Dr. Russo has stopped taking his oxycodone due to side effects (sleepiness, constipation).  As mentioned above, he is getting a cannabis product and he states that his pain is well controlled.    Review of Systems      Overall he feels poorly.  He states that his appetite is still very poor.   He no longer has constipation, and he has essentially weaned himself off the oxycodone and is using cannabis derivatives for pain control.   He has not had any fever.  He complains of fatigue and generalized weakness, and he is here again on a wheelchair today.  His ECOG PS has declined and is now a 3.  He did not want to climb on the examination table and he was examined on his wheelchair.   He denies any anxiety, depression, easy bruising, fevers, chills, night  sweats, vomiting, diarrhea, diplopia, blurred vision, headache, chest pain, palpitations, shortness of breath, breast pain, extremity pain, but he does have difficulty ambulating due to his weakness.   The remainder of the ten-point ROS, including general, skin, lymph, H/N, cardiorespiratory, GI, , Neuro, Endocrine, and psychiatric is negative.     Objective:       Physical Exam    He is alert, oriented to time, place, person, pleasant, well      nourished, in no acute physical distress.  He is here with his wife.                                  VITAL SIGNS:  Reviewed                                      HEENT:  Normal.  There are no nasal, oral, lip, gingival, auricular, lid,    or conjunctival lesions.  Mucosae are moist and pink, and there is no        thrush.  Pupils are equal, reactive to light and accommodation.              Extraocular muscle movements are intact.     Dentition is good.                                  NECK:  Supple without JVD, adenopathy, or thyromegaly.                       LUNGS:  Clear to auscultation on the right, with somewhat decreased air entry on the left.        CARDIOVASCULAR:  Reveals tachycardia with an S1, S2, no murmurs, no rubs, no gallops.         ABDOMEN:  Soft, nontender, without organomegaly.  Bowel sounds are  present.  A scar from his left nephrectomy is identified.   .                                                                 EXTREMITIES:  No cyanosis, clubbing, or edema.     A right subclavian port is again identified.                                                            LYMPHATIC:  There is no cervical, axillary, or supraclavicular adenopathy.   SKIN:  Warm and moist, without petechiae, rashes, induration, or ecchymoses.           NEUROLOGIC:  DTRs are 0-1+ bilaterally, symmetrical, motor function is 5/5,  and cranial nerves are  within normal limits.  Both fundi are sharp.    Assessment:       1. Renal cell carcinoma of kidney excluding renal pelvis    2. CRF, imrpoved    3. Liver metastases    4. Encounter for antineoplastic chemotherapy     5.     Hypercalcemia  6.      Increased transaminases.  7.      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  He will not receive any more chemotherapy given his poor PS.  However, he does not want to  consider hospice.  In view of he above, we will arrange for a Home Health Agency to have a CMP and CBC drawn once a week at home, and if he develops hypercalcemia, he can be brought to the Clinic for zometa.  I have explained to him that his life expectancy is weeks to a few months at most.  He voiced understanding.  Of note, we discussed the findings on his examination; he does NOT want to have a chest x ray.  RTC 28 days with repeat labs.  His multiple questions were answered to his satisfaction.  Patient remains DNR.    Duration of visit was 30 minutes

## 2018-06-28 ENCOUNTER — OFFICE VISIT (OUTPATIENT)
Dept: HEMATOLOGY/ONCOLOGY | Facility: CLINIC | Age: 68
End: 2018-06-28
Payer: MEDICARE

## 2018-06-28 DIAGNOSIS — Z51.11 ENCOUNTER FOR ANTINEOPLASTIC CHEMOTHERAPY: ICD-10-CM

## 2018-06-28 DIAGNOSIS — D63.0 ANEMIA IN NEOPLASTIC DISEASE: ICD-10-CM

## 2018-06-28 DIAGNOSIS — C78.7 LIVER METASTASES: Primary | ICD-10-CM

## 2018-06-28 DIAGNOSIS — C64.2 RENAL CELL CARCINOMA OF LEFT KIDNEY: ICD-10-CM

## 2018-06-28 PROCEDURE — 99214 OFFICE O/P EST MOD 30 MIN: CPT | Mod: S$PBB,,, | Performed by: INTERNAL MEDICINE

## 2018-07-02 ENCOUNTER — TELEPHONE (OUTPATIENT)
Dept: HEMATOLOGY/ONCOLOGY | Facility: CLINIC | Age: 68
End: 2018-07-02

## 2018-07-02 ENCOUNTER — DOCUMENTATION ONLY (OUTPATIENT)
Dept: HEMATOLOGY/ONCOLOGY | Facility: CLINIC | Age: 68
End: 2018-07-02

## 2018-07-02 DIAGNOSIS — C22.9 MALIGNANT NEOPLASM OF LIVER, UNSPECIFIED LIVER MALIGNANCY TYPE: Primary | ICD-10-CM

## 2018-07-02 NOTE — TELEPHONE ENCOUNTER
----- Message from Jackeline Prakash RN sent at 7/2/2018  3:21 PM CDT -----  Contact: pt wife   what about at 12 40 or 1?  ----- Message -----  From: Edwina Jorge  Sent: 7/2/2018  12:07 PM  To: HALI Asencio Dr. does not have anything on that day at 11, please advise  ----- Message -----  From: Andriy Peters  Sent: 7/2/2018  12:04 PM  To: Edwina Jorge    Pt wife is requesting appointments around 11 if possible, I've inform wife  will reach out to reschedule.Thanks!  ----- Message -----  From: Lorelei Rodgers  Sent: 7/2/2018  11:48 AM  To: Andriy Peters    Pt wife called to speak with you about Pt appt time   Callback#[phone]  Thank You  YIMI Rodgers

## 2018-07-02 NOTE — PROGRESS NOTES
Referral received to assist patient with obtaining home health services though [facility].  Obtained HH orders and pertinent pt information and faxed to  [phone]. S/w pt wife and HH agency regarding referral no other needs identified at this time.

## 2018-07-03 ENCOUNTER — TELEPHONE (OUTPATIENT)
Dept: HEMATOLOGY/ONCOLOGY | Facility: CLINIC | Age: 68
End: 2018-07-03

## 2018-07-03 ENCOUNTER — DOCUMENTATION ONLY (OUTPATIENT)
Dept: HEMATOLOGY/ONCOLOGY | Facility: CLINIC | Age: 68
End: 2018-07-03

## 2018-07-03 DIAGNOSIS — C64.9 RENAL CELL CARCINOMA OF KIDNEY EXCLUDING RENAL PELVIS: Primary | ICD-10-CM

## 2018-07-03 NOTE — TELEPHONE ENCOUNTER
----- Message from Lorelei Rodgers sent at 7/3/2018  1:57 PM CDT -----  Contact: [facility] called to speak with nurse   [phone]  Thank You  YIMI Rodgers

## 2018-07-03 NOTE — TELEPHONE ENCOUNTER
----- Message from Anita Powell sent at 7/3/2018  2:54 PM CDT -----  Contact: Ms Paulette Kinney /  [facility] [phone]  Ms Kinney states patient is only in agreement for labwork weekly.  Patient refused lab today will attain labwork on Friday 7/5/2018    Ms Kinney states need prescription for patient Rollator or walker and hospital bed and bedside commode   Ms Kinney states need separate prescription for do not resuscitated order for chart. Please call Ms Paulette Kinney /  [facility] [phone] for more info

## 2018-07-03 NOTE — PROGRESS NOTES
P/c to [facility] s/w Aubrie who confirmed that pt was admitted to home health services with scheduled HH nurse visit today for start of HH care.

## 2018-07-03 NOTE — TELEPHONE ENCOUNTER
Attempted to call in regards to orders requested. No answer, left message with  to have them return call.

## 2018-07-03 NOTE — TELEPHONE ENCOUNTER
Spoke w/ Aliza w/ [facility] regarding pt requiring equipment orders for several items including BSC, rolling walker, shower bench and a hospital bed.     In order for pt to qualify for hospital bed charting documentation must be specific. Clarified will relay and retrieve orders.     Will fax orders once completed. Thank you!

## 2018-07-09 DIAGNOSIS — C79.9 METASTATIC CANCER: Primary | ICD-10-CM

## 2018-07-10 ENCOUNTER — DOCUMENTATION ONLY (OUTPATIENT)
Dept: HEMATOLOGY/ONCOLOGY | Facility: CLINIC | Age: 68
End: 2018-07-10

## 2018-07-10 NOTE — PROGRESS NOTES
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

## 2018-07-10 NOTE — PROGRESS NOTES
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  He is not able to achieve that with wedges.

## 2018-07-11 DIAGNOSIS — C79.9 METASTATIC CANCER: Primary | ICD-10-CM

## 2018-07-23 ENCOUNTER — TELEPHONE (OUTPATIENT)
Dept: HEMATOLOGY/ONCOLOGY | Facility: CLINIC | Age: 68
End: 2018-07-23

## 2018-07-23 ENCOUNTER — DOCUMENTATION ONLY (OUTPATIENT)
Dept: HEMATOLOGY/ONCOLOGY | Facility: CLINIC | Age: 68
End: 2018-07-23

## 2018-07-23 DIAGNOSIS — C64.9 METASTATIC RENAL CELL CARCINOMA, UNSPECIFIED LATERALITY: Primary | ICD-10-CM

## 2018-07-23 RX ORDER — SODIUM CHLORIDE 0.9 % (FLUSH) 0.9 %
10 SYRINGE (ML) INJECTION
COMMUNITY
End: 2021-02-26

## 2018-07-23 RX ORDER — NEEDLES,SAFETY HUBER,DISPOSABL 22GX3/4"
NEEDLE, DISPOSABLE MISCELLANEOUS
COMMUNITY
End: 2021-02-26

## 2018-07-23 NOTE — TELEPHONE ENCOUNTER
----- Message from Becca Nava sent at 7/23/2018 11:03 AM CDT -----  Contact: Prema with [facility]  Prema calling regarding orders for hospital bed and additional documentation      Prema call back number  [phone]

## 2018-07-23 NOTE — TELEPHONE ENCOUNTER
Spoke w/ HALI Lloyd with  who requesting more information be provided so that pt is eligible to receive a wheelchair and hospital bed. Prema to fax information, fax number provided. Awaiting documents.

## 2018-07-24 ENCOUNTER — DOCUMENTATION ONLY (OUTPATIENT)
Dept: HEMATOLOGY/ONCOLOGY | Facility: CLINIC | Age: 68
End: 2018-07-24

## 2018-08-02 ENCOUNTER — DOCUMENTATION ONLY (OUTPATIENT)
Dept: HEMATOLOGY/ONCOLOGY | Facility: CLINIC | Age: 68
End: 2018-08-02

## 2018-08-02 NOTE — PROGRESS NOTES
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  The patient is not able to propel himself.    Adilson Stock MD

## 2018-08-07 ENCOUNTER — LAB VISIT (OUTPATIENT)
Dept: LAB | Facility: HOSPITAL | Age: 68
End: 2018-08-07
Attending: INTERNAL MEDICINE
Payer: MEDICARE

## 2018-08-07 DIAGNOSIS — C64.9 RENAL CELL CARCINOMA, UNSPECIFIED LATERALITY: ICD-10-CM

## 2018-08-08 ENCOUNTER — TELEPHONE (OUTPATIENT)
Dept: HEMATOLOGY/ONCOLOGY | Facility: CLINIC | Age: 68
End: 2018-08-08

## 2018-08-08 NOTE — TELEPHONE ENCOUNTER
----- Message from Becca Nava sent at 8/8/2018  9:47 AM CDT -----  Contact: Prema with [facility] calling regarding documents that required Dr.Theo anthony Lloyd call back number [phone]

## 2018-08-08 NOTE — TELEPHONE ENCOUNTER
Prema requesting further information for wheelchair approval be signed by Dr. Martinez. Fax number provided, awaiting paperwork. Will have Dr. Martinez sign on return to office and fax back.

## 2018-08-31 ENCOUNTER — INFUSION (OUTPATIENT)
Dept: INFUSION THERAPY | Facility: HOSPITAL | Age: 68
End: 2018-08-31
Attending: INTERNAL MEDICINE
Payer: MEDICARE

## 2018-08-31 VITALS
OXYGEN SATURATION: 99 % | DIASTOLIC BLOOD PRESSURE: 76 MMHG | RESPIRATION RATE: 16 BRPM | SYSTOLIC BLOOD PRESSURE: 128 MMHG | TEMPERATURE: 98 F | HEART RATE: 94 BPM

## 2018-08-31 VITALS
RESPIRATION RATE: 16 BRPM | HEART RATE: 77 BPM | SYSTOLIC BLOOD PRESSURE: 113 MMHG | WEIGHT: 194 LBS | HEIGHT: 70 IN | BODY MASS INDEX: 27.77 KG/M2 | DIASTOLIC BLOOD PRESSURE: 72 MMHG

## 2018-08-31 DIAGNOSIS — C64.9 RENAL CELL CARCINOMA, UNSPECIFIED LATERALITY: Primary | ICD-10-CM

## 2018-08-31 DIAGNOSIS — C78.7 LIVER METASTASES: ICD-10-CM

## 2018-08-31 DIAGNOSIS — Z51.11 ENCOUNTER FOR ANTINEOPLASTIC CHEMOTHERAPY: ICD-10-CM

## 2018-08-31 PROCEDURE — 63600175 PHARM REV CODE 636 W HCPCS: Mod: PN | Performed by: INTERNAL MEDICINE

## 2018-08-31 PROCEDURE — 25000003 PHARM REV CODE 250: Mod: PN | Performed by: INTERNAL MEDICINE

## 2018-08-31 PROCEDURE — 96413 CHEMO IV INFUSION 1 HR: CPT | Mod: PN

## 2018-08-31 PROCEDURE — A4216 STERILE WATER/SALINE, 10 ML: HCPCS | Mod: PN | Performed by: INTERNAL MEDICINE

## 2018-08-31 RX ORDER — HEPARIN 100 UNIT/ML
500 SYRINGE INTRAVENOUS
Status: DISCONTINUED | OUTPATIENT
Start: 2018-08-31 | End: 2018-08-31 | Stop reason: HOSPADM

## 2018-08-31 RX ORDER — SODIUM CHLORIDE 0.9 % (FLUSH) 0.9 %
10 SYRINGE (ML) INJECTION
Status: DISCONTINUED | OUTPATIENT
Start: 2018-08-31 | End: 2018-08-31 | Stop reason: HOSPADM

## 2018-08-31 RX ADMIN — SODIUM CHLORIDE: 9 INJECTION, SOLUTION INTRAVENOUS at 12:08

## 2018-08-31 RX ADMIN — SODIUM CHLORIDE, PRESERVATIVE FREE 10 ML: 5 INJECTION INTRAVENOUS at 12:08

## 2018-08-31 RX ADMIN — HEPARIN 500 UNITS: 100 SYRINGE at 12:08

## 2018-08-31 RX ADMIN — ATEZOLIZUMAB 1200 MG: 1200 INJECTION, SOLUTION INTRAVENOUS at 12:08

## 2018-08-31 NOTE — PLAN OF CARE
Problem: Patient Care Overview  Goal: Plan of Care Review  Outcome: Ongoing (interventions implemented as appropriate)  Pt arrived for Tecentriq (3/16/18) chemo infusion. PAC accessed X 1 attempt to RCW, + blood return noted/flushes easily. Pt tolerated treatment very well. PAC flushed/heparinized & deaccessed w/o difficulty. Pt D/C'd home with family & instructions

## 2018-09-20 ENCOUNTER — INFUSION (OUTPATIENT)
Dept: INFUSION THERAPY | Facility: HOSPITAL | Age: 68
End: 2018-09-20
Attending: INTERNAL MEDICINE
Payer: MEDICARE

## 2018-09-20 VITALS
BODY MASS INDEX: 28.06 KG/M2 | SYSTOLIC BLOOD PRESSURE: 107 MMHG | WEIGHT: 196 LBS | HEART RATE: 82 BPM | DIASTOLIC BLOOD PRESSURE: 71 MMHG | HEIGHT: 70 IN | TEMPERATURE: 98 F | RESPIRATION RATE: 17 BRPM

## 2018-09-20 DIAGNOSIS — Z51.11 ENCOUNTER FOR ANTINEOPLASTIC CHEMOTHERAPY: ICD-10-CM

## 2018-09-20 DIAGNOSIS — C78.7 LIVER METASTASES: ICD-10-CM

## 2018-09-20 DIAGNOSIS — C64.9 RENAL CELL CARCINOMA, UNSPECIFIED LATERALITY: Primary | ICD-10-CM

## 2018-09-20 PROCEDURE — 25000003 PHARM REV CODE 250: Mod: PN | Performed by: PHYSICIAN ASSISTANT

## 2018-09-20 PROCEDURE — A4216 STERILE WATER/SALINE, 10 ML: HCPCS | Mod: PN | Performed by: PHYSICIAN ASSISTANT

## 2018-09-20 PROCEDURE — 63600175 PHARM REV CODE 636 W HCPCS: Mod: PN | Performed by: PHYSICIAN ASSISTANT

## 2018-09-20 PROCEDURE — 96413 CHEMO IV INFUSION 1 HR: CPT | Mod: PN

## 2018-09-20 RX ORDER — HEPARIN 100 UNIT/ML
500 SYRINGE INTRAVENOUS
Status: DISCONTINUED | OUTPATIENT
Start: 2018-09-20 | End: 2018-09-20 | Stop reason: HOSPADM

## 2018-09-20 RX ORDER — DOCUSATE SODIUM 100 MG/1
CAPSULE, LIQUID FILLED ORAL
Refills: 1 | COMMUNITY
Start: 2018-08-30 | End: 2019-03-16 | Stop reason: SDUPTHER

## 2018-09-20 RX ORDER — SODIUM CHLORIDE 0.9 % (FLUSH) 0.9 %
10 SYRINGE (ML) INJECTION
Status: DISCONTINUED | OUTPATIENT
Start: 2018-09-20 | End: 2018-09-20 | Stop reason: HOSPADM

## 2018-09-20 RX ORDER — FUROSEMIDE 40 MG/1
TABLET ORAL
Refills: 1 | COMMUNITY
Start: 2018-09-07 | End: 2018-12-17 | Stop reason: SDUPTHER

## 2018-09-20 RX ADMIN — HEPARIN 500 UNITS: 100 SYRINGE at 12:09

## 2018-09-20 RX ADMIN — ATEZOLIZUMAB 1200 MG: 1200 INJECTION, SOLUTION INTRAVENOUS at 12:09

## 2018-09-20 RX ADMIN — SODIUM CHLORIDE: 9 INJECTION, SOLUTION INTRAVENOUS at 11:09

## 2018-09-20 RX ADMIN — SODIUM CHLORIDE, PRESERVATIVE FREE 10 ML: 5 INJECTION INTRAVENOUS at 12:09

## 2018-09-20 NOTE — PLAN OF CARE
Problem: Chemotherapy Effects (Adult)  Goal: Signs and Symptoms of Listed Potential Problems Will be Absent, Minimized or Managed (Chemotherapy Effects)  Signs and symptoms of listed potential problems will be absent, minimized or managed by discharge/transition of care (reference Chemotherapy Effects (Adult) CPG).   Outcome: Ongoing (interventions implemented as appropriate)  Pt in this shift to receive tecentriq. Pt hs no complaints this shift. Port accessed x1 attempt; positive blood return noted. All questions and concerns addressed at this time. Will continue to monitor.

## 2018-09-20 NOTE — PLAN OF CARE
Problem: Patient Care Overview  Goal: Plan of Care Review  Outcome: Ongoing (interventions implemented as appropriate)  Pt tolerated tx well this shift. No signs of distress noted. AVS and calendar given to pt. All questions and concerns addressed at this time.   Goal: Individualization & Mutuality  Outcome: Ongoing (interventions implemented as appropriate)   09/20/18 1257   Individualization   Patient Specific Goals maintain daily ADLs independently   Patient Specific Interventions heparin locked right chest port

## 2018-10-11 ENCOUNTER — INFUSION (OUTPATIENT)
Dept: INFUSION THERAPY | Facility: HOSPITAL | Age: 68
End: 2018-10-11
Attending: INTERNAL MEDICINE
Payer: MEDICARE

## 2018-10-11 VITALS
BODY MASS INDEX: 27.35 KG/M2 | DIASTOLIC BLOOD PRESSURE: 84 MMHG | SYSTOLIC BLOOD PRESSURE: 123 MMHG | WEIGHT: 191 LBS | TEMPERATURE: 98 F | OXYGEN SATURATION: 98 % | HEIGHT: 70 IN | HEART RATE: 88 BPM | RESPIRATION RATE: 16 BRPM

## 2018-10-11 DIAGNOSIS — C78.7 LIVER METASTASES: ICD-10-CM

## 2018-10-11 DIAGNOSIS — Z51.11 ENCOUNTER FOR ANTINEOPLASTIC CHEMOTHERAPY: ICD-10-CM

## 2018-10-11 DIAGNOSIS — C64.9 RENAL CELL CARCINOMA, UNSPECIFIED LATERALITY: Primary | ICD-10-CM

## 2018-10-11 PROCEDURE — 25000003 PHARM REV CODE 250: Mod: PN | Performed by: PHYSICIAN ASSISTANT

## 2018-10-11 PROCEDURE — 96413 CHEMO IV INFUSION 1 HR: CPT | Mod: PN

## 2018-10-11 PROCEDURE — 63600175 PHARM REV CODE 636 W HCPCS: Mod: TB,PN | Performed by: PHYSICIAN ASSISTANT

## 2018-10-11 RX ORDER — HEPARIN 100 UNIT/ML
500 SYRINGE INTRAVENOUS
Status: DISCONTINUED | OUTPATIENT
Start: 2018-10-11 | End: 2018-10-11 | Stop reason: HOSPADM

## 2018-10-11 RX ORDER — SODIUM CHLORIDE 0.9 % (FLUSH) 0.9 %
10 SYRINGE (ML) INJECTION
Status: DISCONTINUED | OUTPATIENT
Start: 2018-10-11 | End: 2018-10-11 | Stop reason: HOSPADM

## 2018-10-11 RX ADMIN — ATEZOLIZUMAB 1200 MG: 1200 INJECTION, SOLUTION INTRAVENOUS at 12:10

## 2018-10-11 RX ADMIN — SODIUM CHLORIDE: 9 INJECTION, SOLUTION INTRAVENOUS at 12:10

## 2018-11-01 ENCOUNTER — INFUSION (OUTPATIENT)
Dept: INFUSION THERAPY | Facility: HOSPITAL | Age: 68
End: 2018-11-01
Attending: INTERNAL MEDICINE
Payer: MEDICARE

## 2018-11-01 VITALS
TEMPERATURE: 98 F | RESPIRATION RATE: 18 BRPM | DIASTOLIC BLOOD PRESSURE: 72 MMHG | SYSTOLIC BLOOD PRESSURE: 142 MMHG | HEART RATE: 56 BPM

## 2018-11-01 DIAGNOSIS — Z51.11 ENCOUNTER FOR ANTINEOPLASTIC CHEMOTHERAPY: ICD-10-CM

## 2018-11-01 DIAGNOSIS — C78.7 LIVER METASTASES: ICD-10-CM

## 2018-11-01 DIAGNOSIS — C64.9 RENAL CELL CARCINOMA, UNSPECIFIED LATERALITY: Primary | ICD-10-CM

## 2018-11-01 PROCEDURE — 63600175 PHARM REV CODE 636 W HCPCS: Mod: TB,PN | Performed by: NURSE PRACTITIONER

## 2018-11-01 PROCEDURE — 25000003 PHARM REV CODE 250: Mod: PN | Performed by: NURSE PRACTITIONER

## 2018-11-01 PROCEDURE — 96413 CHEMO IV INFUSION 1 HR: CPT | Mod: PN

## 2018-11-01 RX ORDER — SODIUM CHLORIDE 0.9 % (FLUSH) 0.9 %
10 SYRINGE (ML) INJECTION
Status: DISCONTINUED | OUTPATIENT
Start: 2018-11-01 | End: 2018-11-01 | Stop reason: HOSPADM

## 2018-11-01 RX ORDER — HEPARIN 100 UNIT/ML
500 SYRINGE INTRAVENOUS
Status: DISCONTINUED | OUTPATIENT
Start: 2018-11-01 | End: 2018-11-01 | Stop reason: HOSPADM

## 2018-11-01 RX ADMIN — ATEZOLIZUMAB 1200 MG: 1200 INJECTION, SOLUTION INTRAVENOUS at 12:11

## 2018-11-01 RX ADMIN — SODIUM CHLORIDE: 9 INJECTION, SOLUTION INTRAVENOUS at 12:11

## 2018-11-23 ENCOUNTER — INFUSION (OUTPATIENT)
Dept: INFUSION THERAPY | Facility: HOSPITAL | Age: 68
End: 2018-11-23
Attending: INTERNAL MEDICINE
Payer: MEDICARE

## 2018-11-23 VITALS
TEMPERATURE: 97 F | DIASTOLIC BLOOD PRESSURE: 73 MMHG | HEART RATE: 83 BPM | OXYGEN SATURATION: 95 % | SYSTOLIC BLOOD PRESSURE: 118 MMHG | RESPIRATION RATE: 16 BRPM

## 2018-11-23 VITALS
WEIGHT: 202.38 LBS | HEART RATE: 72 BPM | DIASTOLIC BLOOD PRESSURE: 76 MMHG | HEIGHT: 70 IN | BODY MASS INDEX: 28.97 KG/M2 | SYSTOLIC BLOOD PRESSURE: 129 MMHG

## 2018-11-23 DIAGNOSIS — C64.9 RENAL CELL CARCINOMA, UNSPECIFIED LATERALITY: Primary | ICD-10-CM

## 2018-11-23 DIAGNOSIS — C78.7 LIVER METASTASES: ICD-10-CM

## 2018-11-23 DIAGNOSIS — Z51.11 ENCOUNTER FOR ANTINEOPLASTIC CHEMOTHERAPY: ICD-10-CM

## 2018-11-23 PROCEDURE — 63600175 PHARM REV CODE 636 W HCPCS: Mod: TB,PN | Performed by: NURSE PRACTITIONER

## 2018-11-23 PROCEDURE — 96413 CHEMO IV INFUSION 1 HR: CPT | Mod: PN

## 2018-11-23 PROCEDURE — 25000003 PHARM REV CODE 250: Mod: PN | Performed by: NURSE PRACTITIONER

## 2018-11-23 RX ORDER — SODIUM CHLORIDE 0.9 % (FLUSH) 0.9 %
10 SYRINGE (ML) INJECTION
Status: DISCONTINUED | OUTPATIENT
Start: 2018-11-23 | End: 2018-11-23 | Stop reason: HOSPADM

## 2018-11-23 RX ADMIN — ATEZOLIZUMAB 1200 MG: 1200 INJECTION, SOLUTION INTRAVENOUS at 09:11

## 2018-11-23 RX ADMIN — SODIUM CHLORIDE: 9 INJECTION, SOLUTION INTRAVENOUS at 09:11

## 2018-12-13 ENCOUNTER — INFUSION (OUTPATIENT)
Dept: INFUSION THERAPY | Facility: HOSPITAL | Age: 68
End: 2018-12-13
Attending: INTERNAL MEDICINE
Payer: MEDICARE

## 2018-12-13 VITALS — DIASTOLIC BLOOD PRESSURE: 67 MMHG | HEART RATE: 61 BPM | SYSTOLIC BLOOD PRESSURE: 116 MMHG

## 2018-12-13 DIAGNOSIS — Z51.11 ENCOUNTER FOR ANTINEOPLASTIC CHEMOTHERAPY: ICD-10-CM

## 2018-12-13 DIAGNOSIS — C64.9 RENAL CELL CARCINOMA, UNSPECIFIED LATERALITY: Primary | ICD-10-CM

## 2018-12-13 DIAGNOSIS — C78.7 LIVER METASTASES: ICD-10-CM

## 2018-12-13 PROCEDURE — 96413 CHEMO IV INFUSION 1 HR: CPT | Mod: PN

## 2018-12-13 PROCEDURE — 25000003 PHARM REV CODE 250: Mod: PN | Performed by: NURSE PRACTITIONER

## 2018-12-13 PROCEDURE — 63600175 PHARM REV CODE 636 W HCPCS: Mod: TB,PN | Performed by: NURSE PRACTITIONER

## 2018-12-13 RX ORDER — SODIUM CHLORIDE 0.9 % (FLUSH) 0.9 %
10 SYRINGE (ML) INJECTION
Status: DISCONTINUED | OUTPATIENT
Start: 2018-12-13 | End: 2018-12-13 | Stop reason: HOSPADM

## 2018-12-13 RX ADMIN — ATEZOLIZUMAB 1200 MG: 1200 INJECTION, SOLUTION INTRAVENOUS at 03:12

## 2018-12-13 RX ADMIN — SODIUM CHLORIDE: 9 INJECTION, SOLUTION INTRAVENOUS at 03:12

## 2018-12-13 NOTE — PLAN OF CARE
Problem: Oral Intake Altered (Oncology Care)  Goal: Optimal Oral Intake  Outcome: Ongoing (interventions implemented as appropriate)  TOLERATED TREATMENT WITHOUT DIFFICULTY.

## 2019-01-03 ENCOUNTER — INFUSION (OUTPATIENT)
Dept: INFUSION THERAPY | Facility: HOSPITAL | Age: 69
End: 2019-01-03
Attending: INTERNAL MEDICINE
Payer: MEDICARE

## 2019-01-03 VITALS
BODY MASS INDEX: 29.21 KG/M2 | WEIGHT: 203.63 LBS | TEMPERATURE: 98 F | DIASTOLIC BLOOD PRESSURE: 81 MMHG | SYSTOLIC BLOOD PRESSURE: 128 MMHG | RESPIRATION RATE: 18 BRPM | HEART RATE: 56 BPM

## 2019-01-03 DIAGNOSIS — C64.9 RENAL CELL CARCINOMA, UNSPECIFIED LATERALITY: Primary | ICD-10-CM

## 2019-01-03 DIAGNOSIS — Z51.11 ENCOUNTER FOR ANTINEOPLASTIC CHEMOTHERAPY: ICD-10-CM

## 2019-01-03 DIAGNOSIS — C78.7 LIVER METASTASES: ICD-10-CM

## 2019-01-03 PROBLEM — E03.9 PRIMARY HYPOTHYROIDISM: Status: ACTIVE | Noted: 2019-01-03

## 2019-01-03 PROCEDURE — 25000003 PHARM REV CODE 250: Mod: PN | Performed by: PHYSICIAN ASSISTANT

## 2019-01-03 PROCEDURE — 63600175 PHARM REV CODE 636 W HCPCS: Mod: JG,PN | Performed by: PHYSICIAN ASSISTANT

## 2019-01-03 PROCEDURE — 96413 CHEMO IV INFUSION 1 HR: CPT | Mod: PN

## 2019-01-03 PROCEDURE — A4216 STERILE WATER/SALINE, 10 ML: HCPCS | Mod: PN | Performed by: PHYSICIAN ASSISTANT

## 2019-01-03 RX ORDER — HEPARIN 100 UNIT/ML
500 SYRINGE INTRAVENOUS
Status: DISCONTINUED | OUTPATIENT
Start: 2019-01-03 | End: 2019-01-03 | Stop reason: HOSPADM

## 2019-01-03 RX ORDER — SODIUM CHLORIDE 0.9 % (FLUSH) 0.9 %
10 SYRINGE (ML) INJECTION
Status: DISCONTINUED | OUTPATIENT
Start: 2019-01-03 | End: 2019-01-03 | Stop reason: HOSPADM

## 2019-01-03 RX ADMIN — ATEZOLIZUMAB 1200 MG: 1200 INJECTION, SOLUTION INTRAVENOUS at 01:01

## 2019-01-03 RX ADMIN — SODIUM CHLORIDE: 9 INJECTION, SOLUTION INTRAVENOUS at 01:01

## 2019-01-03 RX ADMIN — HEPARIN 500 UNITS: 100 SYRINGE at 02:01

## 2019-01-03 RX ADMIN — Medication 10 ML: at 02:01

## 2019-01-03 NOTE — PLAN OF CARE
Problem: Fatigue  Goal: Improved Activity Tolerance    Intervention: Promote Energy Conservation  Pt receiving tecentriq on shift. Pt tolerated tx well. Only complaint is generalized fatigue . Will continue to monitor

## 2019-01-03 NOTE — PLAN OF CARE
Problem: Adult Inpatient Plan of Care  Goal: Plan of Care Review  Outcome: Ongoing (interventions implemented as appropriate)  Pt tolerated tx well. No signs of infusion reaction noted. AVS given to pt Pt educated to call Dr with any issues. Pt verbalized understanding. Pt adequate for discharge.

## 2019-01-24 ENCOUNTER — INFUSION (OUTPATIENT)
Dept: INFUSION THERAPY | Facility: HOSPITAL | Age: 69
End: 2019-01-24
Attending: INTERNAL MEDICINE
Payer: MEDICARE

## 2019-01-24 VITALS
SYSTOLIC BLOOD PRESSURE: 131 MMHG | TEMPERATURE: 96 F | WEIGHT: 204 LBS | HEART RATE: 50 BPM | RESPIRATION RATE: 18 BRPM | DIASTOLIC BLOOD PRESSURE: 79 MMHG | HEIGHT: 70 IN | BODY MASS INDEX: 29.2 KG/M2

## 2019-01-24 DIAGNOSIS — C64.9 RENAL CELL CARCINOMA, UNSPECIFIED LATERALITY: Primary | ICD-10-CM

## 2019-01-24 DIAGNOSIS — C78.7 LIVER METASTASES: ICD-10-CM

## 2019-01-24 DIAGNOSIS — Z51.11 ENCOUNTER FOR ANTINEOPLASTIC CHEMOTHERAPY: ICD-10-CM

## 2019-01-24 PROCEDURE — 96413 CHEMO IV INFUSION 1 HR: CPT | Mod: PN

## 2019-01-24 PROCEDURE — A4216 STERILE WATER/SALINE, 10 ML: HCPCS | Mod: PN | Performed by: PHYSICIAN ASSISTANT

## 2019-01-24 PROCEDURE — 63600175 PHARM REV CODE 636 W HCPCS: Mod: PN | Performed by: PHYSICIAN ASSISTANT

## 2019-01-24 PROCEDURE — 25000003 PHARM REV CODE 250: Mod: PN | Performed by: PHYSICIAN ASSISTANT

## 2019-01-24 RX ORDER — HEPARIN 100 UNIT/ML
500 SYRINGE INTRAVENOUS
Status: DISCONTINUED | OUTPATIENT
Start: 2019-01-24 | End: 2019-01-24 | Stop reason: HOSPADM

## 2019-01-24 RX ORDER — SODIUM CHLORIDE 0.9 % (FLUSH) 0.9 %
10 SYRINGE (ML) INJECTION
Status: DISCONTINUED | OUTPATIENT
Start: 2019-01-24 | End: 2019-01-24 | Stop reason: HOSPADM

## 2019-01-24 RX ADMIN — Medication 10 ML: at 02:01

## 2019-01-24 RX ADMIN — Medication 500 UNITS: at 02:01

## 2019-01-24 RX ADMIN — SODIUM CHLORIDE: 9 INJECTION, SOLUTION INTRAVENOUS at 01:01

## 2019-01-24 RX ADMIN — Medication 10 ML: at 01:01

## 2019-01-24 RX ADMIN — ATEZOLIZUMAB 1200 MG: 1200 INJECTION, SOLUTION INTRAVENOUS at 01:01

## 2019-01-24 NOTE — PLAN OF CARE
Problem: Adult Inpatient Plan of Care  Goal: Plan of Care Review  Outcome: Ongoing (interventions implemented as appropriate)  Pt tolerated treatment well today; vitals remained stable.  AVS printed with f/u appointments listed; all questions answered.  Pt ambulated independently with wife for assistance.

## 2019-01-24 NOTE — PLAN OF CARE
Problem: Adult Inpatient Plan of Care  Goal: Patient-Specific Goal (Individualization)  Outcome: Ongoing (interventions implemented as appropriate)   01/24/19 1403   OTHER   Anxieties, Fears or Concerns None   Individualized Care Needs Warm blanket   Patient-Specific Goal (Individualization)   Patient-Specific Goals (Include Timeframe) Prevent infection during treatments       Problem: Fatigue  Goal: Improved Activity Tolerance    Intervention: Promote Energy Conservation   01/24/19 1403   Promote Airway Secretion Clearance   Activity Management activity adjusted per tolerance*;activity encouraged*

## 2019-02-14 ENCOUNTER — INFUSION (OUTPATIENT)
Dept: INFUSION THERAPY | Facility: HOSPITAL | Age: 69
End: 2019-02-14
Attending: PHYSICIAN ASSISTANT
Payer: MEDICARE

## 2019-02-14 VITALS
BODY MASS INDEX: 29.35 KG/M2 | TEMPERATURE: 97 F | HEIGHT: 70 IN | HEART RATE: 75 BPM | RESPIRATION RATE: 18 BRPM | DIASTOLIC BLOOD PRESSURE: 80 MMHG | WEIGHT: 205 LBS | SYSTOLIC BLOOD PRESSURE: 145 MMHG

## 2019-02-14 DIAGNOSIS — C78.7 LIVER METASTASES: ICD-10-CM

## 2019-02-14 DIAGNOSIS — Z51.11 ENCOUNTER FOR ANTINEOPLASTIC CHEMOTHERAPY: ICD-10-CM

## 2019-02-14 DIAGNOSIS — C64.9 RENAL CELL CARCINOMA, UNSPECIFIED LATERALITY: Primary | ICD-10-CM

## 2019-02-14 PROCEDURE — 96413 CHEMO IV INFUSION 1 HR: CPT | Mod: PN

## 2019-02-14 PROCEDURE — 25000003 PHARM REV CODE 250: Mod: PN | Performed by: NURSE PRACTITIONER

## 2019-02-14 PROCEDURE — 63600175 PHARM REV CODE 636 W HCPCS: Mod: PN | Performed by: NURSE PRACTITIONER

## 2019-02-14 RX ORDER — HEPARIN 100 UNIT/ML
500 SYRINGE INTRAVENOUS
Status: DISCONTINUED | OUTPATIENT
Start: 2019-02-14 | End: 2019-02-14 | Stop reason: HOSPADM

## 2019-02-14 RX ORDER — SODIUM CHLORIDE 0.9 % (FLUSH) 0.9 %
10 SYRINGE (ML) INJECTION
Status: DISCONTINUED | OUTPATIENT
Start: 2019-02-14 | End: 2019-02-14 | Stop reason: HOSPADM

## 2019-02-14 RX ADMIN — HEPARIN SODIUM (PORCINE) LOCK FLUSH IV SOLN 100 UNIT/ML 500 UNITS: 100 SOLUTION at 02:02

## 2019-02-14 RX ADMIN — ATEZOLIZUMAB 1200 MG: 1200 INJECTION, SOLUTION INTRAVENOUS at 02:02

## 2019-02-14 RX ADMIN — SODIUM CHLORIDE: 9 INJECTION, SOLUTION INTRAVENOUS at 01:02

## 2019-02-14 NOTE — PLAN OF CARE
Problem: Fatigue (Oncology Care)  Goal: Improved Activity Tolerance  Outcome: Ongoing (interventions implemented as appropriate)  Patient receiving chemotherapy, no pre-meds given. Right chest port accessed. No complaints at this time. VS stable. Consent signed, patient educated regarding potential side effects.  Will continue to monitor.

## 2019-02-14 NOTE — PLAN OF CARE
Problem: Adult Inpatient Plan of Care  Goal: Plan of Care Review  Outcome: Ongoing (interventions implemented as appropriate)  Patient tolerated treatment without any complications. PAC hep locked and deacessed. D/C VS taken and within normal range. Patient aware of next appt date and time. Patient leaving with ind gait.

## 2019-03-07 ENCOUNTER — INFUSION (OUTPATIENT)
Dept: INFUSION THERAPY | Facility: HOSPITAL | Age: 69
End: 2019-03-07
Attending: INTERNAL MEDICINE
Payer: MEDICARE

## 2019-03-07 VITALS
BODY MASS INDEX: 29.49 KG/M2 | TEMPERATURE: 97 F | HEIGHT: 70 IN | SYSTOLIC BLOOD PRESSURE: 134 MMHG | DIASTOLIC BLOOD PRESSURE: 75 MMHG | RESPIRATION RATE: 18 BRPM | HEART RATE: 51 BPM | WEIGHT: 206 LBS

## 2019-03-07 DIAGNOSIS — C64.9 RENAL CELL CARCINOMA, UNSPECIFIED LATERALITY: Primary | ICD-10-CM

## 2019-03-07 DIAGNOSIS — C78.7 LIVER METASTASES: ICD-10-CM

## 2019-03-07 DIAGNOSIS — Z51.11 ENCOUNTER FOR ANTINEOPLASTIC CHEMOTHERAPY: ICD-10-CM

## 2019-03-07 PROCEDURE — 96413 CHEMO IV INFUSION 1 HR: CPT | Mod: PN

## 2019-03-07 PROCEDURE — A4216 STERILE WATER/SALINE, 10 ML: HCPCS | Mod: PN | Performed by: NURSE PRACTITIONER

## 2019-03-07 PROCEDURE — 63600175 PHARM REV CODE 636 W HCPCS: Mod: JG,PN | Performed by: NURSE PRACTITIONER

## 2019-03-07 PROCEDURE — 25000003 PHARM REV CODE 250: Mod: PN | Performed by: NURSE PRACTITIONER

## 2019-03-07 RX ORDER — SODIUM CHLORIDE 0.9 % (FLUSH) 0.9 %
10 SYRINGE (ML) INJECTION
Status: DISCONTINUED | OUTPATIENT
Start: 2019-03-07 | End: 2019-03-07 | Stop reason: HOSPADM

## 2019-03-07 RX ORDER — HEPARIN 100 UNIT/ML
500 SYRINGE INTRAVENOUS
Status: DISCONTINUED | OUTPATIENT
Start: 2019-03-07 | End: 2019-03-07 | Stop reason: HOSPADM

## 2019-03-07 RX ADMIN — Medication 10 ML: at 01:03

## 2019-03-07 RX ADMIN — SODIUM CHLORIDE: 9 INJECTION, SOLUTION INTRAVENOUS at 01:03

## 2019-03-07 RX ADMIN — ATEZOLIZUMAB 1200 MG: 1200 INJECTION, SOLUTION INTRAVENOUS at 01:03

## 2019-03-07 RX ADMIN — Medication 10 ML: at 02:03

## 2019-03-07 RX ADMIN — HEPARIN 500 UNITS: 100 SYRINGE at 02:03

## 2019-03-07 NOTE — PLAN OF CARE
Problem: Adult Inpatient Plan of Care  Goal: Plan of Care Review  Outcome: Ongoing (interventions implemented as appropriate)  Pt tolerated Tecentriq well today; vitals remained stable.  AVS printed with f/u appointments listed; all questions answered.  Pt ambulated independently.    Goal: Patient-Specific Goal (Individualization)  Outcome: Ongoing (interventions implemented as appropriate)   03/07/19 1501   OTHER   Anxieties, Fears or Concerns None   Individualized Care Needs Warm blanket provided   Patient-Specific Goal (Individualization)   Patient-Specific Goals (Include Timeframe) Prevent infection during treatments       Problem: Fatigue (Oncology Care)  Goal: Improved Activity Tolerance    Intervention: Promote Energy Conservation   03/07/19 1501   Promote Airway Secretion Clearance   Activity Management activity adjusted per tolerance;activity encouraged

## 2019-03-28 ENCOUNTER — INFUSION (OUTPATIENT)
Dept: INFUSION THERAPY | Facility: HOSPITAL | Age: 69
End: 2019-03-28
Attending: INTERNAL MEDICINE
Payer: MEDICARE

## 2019-03-28 VITALS
DIASTOLIC BLOOD PRESSURE: 65 MMHG | HEIGHT: 70 IN | BODY MASS INDEX: 29.49 KG/M2 | TEMPERATURE: 98 F | HEART RATE: 51 BPM | SYSTOLIC BLOOD PRESSURE: 132 MMHG | WEIGHT: 206 LBS | RESPIRATION RATE: 18 BRPM

## 2019-03-28 DIAGNOSIS — Z51.11 ENCOUNTER FOR ANTINEOPLASTIC CHEMOTHERAPY: ICD-10-CM

## 2019-03-28 DIAGNOSIS — C64.9 RENAL CELL CARCINOMA, UNSPECIFIED LATERALITY: Primary | ICD-10-CM

## 2019-03-28 DIAGNOSIS — C78.7 LIVER METASTASES: ICD-10-CM

## 2019-03-28 PROCEDURE — 96413 CHEMO IV INFUSION 1 HR: CPT | Mod: PN

## 2019-03-28 PROCEDURE — 25000003 PHARM REV CODE 250: Mod: PN | Performed by: NURSE PRACTITIONER

## 2019-03-28 PROCEDURE — 63600175 PHARM REV CODE 636 W HCPCS: Mod: PN | Performed by: NURSE PRACTITIONER

## 2019-03-28 RX ORDER — SODIUM CHLORIDE 0.9 % (FLUSH) 0.9 %
10 SYRINGE (ML) INJECTION
Status: DISCONTINUED | OUTPATIENT
Start: 2019-03-28 | End: 2019-03-28 | Stop reason: HOSPADM

## 2019-03-28 RX ORDER — HEPARIN 100 UNIT/ML
500 SYRINGE INTRAVENOUS
Status: DISCONTINUED | OUTPATIENT
Start: 2019-03-28 | End: 2019-03-28 | Stop reason: HOSPADM

## 2019-03-28 RX ADMIN — ATEZOLIZUMAB 1200 MG: 1200 INJECTION, SOLUTION INTRAVENOUS at 01:03

## 2019-03-28 RX ADMIN — HEPARIN 500 UNITS: 100 SYRINGE at 02:03

## 2019-03-28 RX ADMIN — SODIUM CHLORIDE: 9 INJECTION, SOLUTION INTRAVENOUS at 01:03

## 2019-04-18 ENCOUNTER — INFUSION (OUTPATIENT)
Dept: INFUSION THERAPY | Facility: HOSPITAL | Age: 69
End: 2019-04-18
Attending: INTERNAL MEDICINE
Payer: MEDICARE

## 2019-04-18 VITALS
SYSTOLIC BLOOD PRESSURE: 138 MMHG | RESPIRATION RATE: 18 BRPM | TEMPERATURE: 98 F | DIASTOLIC BLOOD PRESSURE: 74 MMHG | HEART RATE: 72 BPM

## 2019-04-18 DIAGNOSIS — Z51.11 ENCOUNTER FOR ANTINEOPLASTIC CHEMOTHERAPY: ICD-10-CM

## 2019-04-18 DIAGNOSIS — C64.9 RENAL CELL CARCINOMA, UNSPECIFIED LATERALITY: Primary | ICD-10-CM

## 2019-04-18 DIAGNOSIS — C78.7 LIVER METASTASES: ICD-10-CM

## 2019-04-18 PROCEDURE — 96413 CHEMO IV INFUSION 1 HR: CPT | Mod: PN

## 2019-04-18 PROCEDURE — 25000003 PHARM REV CODE 250: Mod: PN | Performed by: NURSE PRACTITIONER

## 2019-04-18 PROCEDURE — 63600175 PHARM REV CODE 636 W HCPCS: Mod: JG,PN | Performed by: NURSE PRACTITIONER

## 2019-04-18 RX ORDER — SODIUM CHLORIDE 0.9 % (FLUSH) 0.9 %
10 SYRINGE (ML) INJECTION
Status: DISCONTINUED | OUTPATIENT
Start: 2019-04-18 | End: 2019-04-18 | Stop reason: HOSPADM

## 2019-04-18 RX ADMIN — ATEZOLIZUMAB 1200 MG: 1200 INJECTION, SOLUTION INTRAVENOUS at 02:04

## 2019-04-18 RX ADMIN — SODIUM CHLORIDE: 9 INJECTION, SOLUTION INTRAVENOUS at 02:04

## 2019-04-18 NOTE — PLAN OF CARE
Problem: Adult Inpatient Plan of Care  Goal: Patient-Specific Goal (Individualization)  Outcome: Ongoing (interventions implemented as appropriate)  TOLERATED TREATMENT WITHOUT DIFFICULTY.    Problem: Fatigue (Oncology Care)  Goal: Improved Activity Tolerance  Outcome: Ongoing (interventions implemented as appropriate)  TOLERATED TREATMENT WITHOUT DIFFICULTY.

## 2019-05-09 ENCOUNTER — INFUSION (OUTPATIENT)
Dept: INFUSION THERAPY | Facility: HOSPITAL | Age: 69
End: 2019-05-09
Attending: INTERNAL MEDICINE
Payer: MEDICARE

## 2019-05-09 VITALS
WEIGHT: 207 LBS | DIASTOLIC BLOOD PRESSURE: 74 MMHG | SYSTOLIC BLOOD PRESSURE: 118 MMHG | BODY MASS INDEX: 29.63 KG/M2 | TEMPERATURE: 97 F | HEART RATE: 56 BPM | HEIGHT: 70 IN | RESPIRATION RATE: 18 BRPM

## 2019-05-09 DIAGNOSIS — C64.9 RENAL CELL CARCINOMA, UNSPECIFIED LATERALITY: Primary | ICD-10-CM

## 2019-05-09 DIAGNOSIS — C78.7 LIVER METASTASES: ICD-10-CM

## 2019-05-09 DIAGNOSIS — Z51.11 ENCOUNTER FOR ANTINEOPLASTIC CHEMOTHERAPY: ICD-10-CM

## 2019-05-09 PROCEDURE — 63600175 PHARM REV CODE 636 W HCPCS: Mod: JG,PN | Performed by: NURSE PRACTITIONER

## 2019-05-09 PROCEDURE — 96413 CHEMO IV INFUSION 1 HR: CPT | Mod: PN

## 2019-05-09 PROCEDURE — 25000003 PHARM REV CODE 250: Mod: PN | Performed by: NURSE PRACTITIONER

## 2019-05-09 RX ORDER — SODIUM CHLORIDE 0.9 % (FLUSH) 0.9 %
10 SYRINGE (ML) INJECTION
Status: DISCONTINUED | OUTPATIENT
Start: 2019-05-09 | End: 2019-05-09 | Stop reason: HOSPADM

## 2019-05-09 RX ADMIN — SODIUM CHLORIDE: 900 INJECTION, SOLUTION INTRAVENOUS at 02:05

## 2019-05-09 RX ADMIN — ATEZOLIZUMAB 1200 MG: 1200 INJECTION, SOLUTION INTRAVENOUS at 02:05

## 2019-05-30 ENCOUNTER — INFUSION (OUTPATIENT)
Dept: INFUSION THERAPY | Facility: HOSPITAL | Age: 69
End: 2019-05-30
Attending: INTERNAL MEDICINE
Payer: MEDICARE

## 2019-05-30 VITALS — DIASTOLIC BLOOD PRESSURE: 72 MMHG | HEART RATE: 48 BPM | SYSTOLIC BLOOD PRESSURE: 135 MMHG

## 2019-05-30 DIAGNOSIS — Z51.11 ENCOUNTER FOR ANTINEOPLASTIC CHEMOTHERAPY: ICD-10-CM

## 2019-05-30 DIAGNOSIS — C78.7 LIVER METASTASES: ICD-10-CM

## 2019-05-30 DIAGNOSIS — C64.9 RENAL CELL CARCINOMA, UNSPECIFIED LATERALITY: Primary | ICD-10-CM

## 2019-05-30 PROCEDURE — 25000003 PHARM REV CODE 250: Mod: PN | Performed by: NURSE PRACTITIONER

## 2019-05-30 PROCEDURE — 96413 CHEMO IV INFUSION 1 HR: CPT | Mod: PN

## 2019-05-30 PROCEDURE — 63600175 PHARM REV CODE 636 W HCPCS: Mod: JG,PN | Performed by: NURSE PRACTITIONER

## 2019-05-30 PROCEDURE — A4216 STERILE WATER/SALINE, 10 ML: HCPCS | Mod: PN | Performed by: NURSE PRACTITIONER

## 2019-05-30 RX ORDER — HEPARIN 100 UNIT/ML
500 SYRINGE INTRAVENOUS
Status: DISCONTINUED | OUTPATIENT
Start: 2019-05-30 | End: 2019-05-30 | Stop reason: HOSPADM

## 2019-05-30 RX ORDER — SODIUM CHLORIDE 0.9 % (FLUSH) 0.9 %
10 SYRINGE (ML) INJECTION
Status: DISCONTINUED | OUTPATIENT
Start: 2019-05-30 | End: 2019-05-30 | Stop reason: HOSPADM

## 2019-05-30 RX ADMIN — HEPARIN 500 UNITS: 100 SYRINGE at 03:05

## 2019-05-30 RX ADMIN — ATEZOLIZUMAB 1200 MG: 1200 INJECTION, SOLUTION INTRAVENOUS at 02:05

## 2019-05-30 RX ADMIN — SODIUM CHLORIDE: 9 INJECTION, SOLUTION INTRAVENOUS at 01:05

## 2019-05-30 RX ADMIN — Medication 10 ML: at 01:05

## 2019-06-20 ENCOUNTER — INFUSION (OUTPATIENT)
Dept: INFUSION THERAPY | Facility: HOSPITAL | Age: 69
End: 2019-06-20
Attending: INTERNAL MEDICINE
Payer: MEDICARE

## 2019-06-20 VITALS
HEART RATE: 49 BPM | BODY MASS INDEX: 29.49 KG/M2 | DIASTOLIC BLOOD PRESSURE: 76 MMHG | WEIGHT: 206 LBS | SYSTOLIC BLOOD PRESSURE: 129 MMHG | HEIGHT: 70 IN | RESPIRATION RATE: 18 BRPM | TEMPERATURE: 97 F

## 2019-06-20 DIAGNOSIS — C78.7 LIVER METASTASES: ICD-10-CM

## 2019-06-20 DIAGNOSIS — C64.9 RENAL CELL CARCINOMA, UNSPECIFIED LATERALITY: Primary | ICD-10-CM

## 2019-06-20 DIAGNOSIS — Z51.11 ENCOUNTER FOR ANTINEOPLASTIC CHEMOTHERAPY: ICD-10-CM

## 2019-06-20 PROCEDURE — A4216 STERILE WATER/SALINE, 10 ML: HCPCS | Mod: PN | Performed by: NURSE PRACTITIONER

## 2019-06-20 PROCEDURE — 25000003 PHARM REV CODE 250: Mod: PN | Performed by: NURSE PRACTITIONER

## 2019-06-20 PROCEDURE — 96413 CHEMO IV INFUSION 1 HR: CPT | Mod: PN

## 2019-06-20 PROCEDURE — 63600175 PHARM REV CODE 636 W HCPCS: Mod: PN | Performed by: NURSE PRACTITIONER

## 2019-06-20 RX ORDER — SODIUM CHLORIDE 0.9 % (FLUSH) 0.9 %
10 SYRINGE (ML) INJECTION
Status: DISCONTINUED | OUTPATIENT
Start: 2019-06-20 | End: 2019-06-20 | Stop reason: HOSPADM

## 2019-06-20 RX ORDER — HEPARIN 100 UNIT/ML
500 SYRINGE INTRAVENOUS
Status: DISCONTINUED | OUTPATIENT
Start: 2019-06-20 | End: 2019-06-20 | Stop reason: HOSPADM

## 2019-06-20 RX ADMIN — ATEZOLIZUMAB 1200 MG: 1200 INJECTION, SOLUTION INTRAVENOUS at 03:06

## 2019-06-20 RX ADMIN — Medication 10 ML: at 03:06

## 2019-06-20 RX ADMIN — HEPARIN SODIUM (PORCINE) LOCK FLUSH IV SOLN 100 UNIT/ML 500 UNITS: 100 SOLUTION at 04:06

## 2019-06-20 RX ADMIN — SODIUM CHLORIDE: 0.9 INJECTION, SOLUTION INTRAVENOUS at 03:06

## 2019-06-20 NOTE — PLAN OF CARE
Problem: Adult Inpatient Plan of Care  Goal: Plan of Care Review  Outcome: Ongoing (interventions implemented as appropriate)  Adequate for discharge.   Pt tolerated  infusion without noted distress.  Reviewed upcoming appointments.  All questions answered. Advised to call MD with any questions or concerns.   Ambulated from infusion .

## 2019-07-11 ENCOUNTER — INFUSION (OUTPATIENT)
Dept: INFUSION THERAPY | Facility: HOSPITAL | Age: 69
End: 2019-07-11
Attending: INTERNAL MEDICINE
Payer: MEDICARE

## 2019-07-11 VITALS
HEART RATE: 54 BPM | SYSTOLIC BLOOD PRESSURE: 121 MMHG | TEMPERATURE: 98 F | BODY MASS INDEX: 29.63 KG/M2 | WEIGHT: 207 LBS | DIASTOLIC BLOOD PRESSURE: 66 MMHG | HEIGHT: 70 IN | RESPIRATION RATE: 18 BRPM

## 2019-07-11 DIAGNOSIS — C64.9 RENAL CELL CARCINOMA, UNSPECIFIED LATERALITY: Primary | ICD-10-CM

## 2019-07-11 DIAGNOSIS — C78.7 LIVER METASTASES: ICD-10-CM

## 2019-07-11 DIAGNOSIS — Z51.11 ENCOUNTER FOR ANTINEOPLASTIC CHEMOTHERAPY: ICD-10-CM

## 2019-07-11 PROCEDURE — 96413 CHEMO IV INFUSION 1 HR: CPT | Mod: PN

## 2019-07-11 PROCEDURE — 63600175 PHARM REV CODE 636 W HCPCS: Mod: JG,PN | Performed by: NURSE PRACTITIONER

## 2019-07-11 PROCEDURE — 25000003 PHARM REV CODE 250: Mod: PN | Performed by: NURSE PRACTITIONER

## 2019-07-11 RX ORDER — SODIUM CHLORIDE 0.9 % (FLUSH) 0.9 %
10 SYRINGE (ML) INJECTION
Status: DISCONTINUED | OUTPATIENT
Start: 2019-07-11 | End: 2019-07-11 | Stop reason: HOSPADM

## 2019-07-11 RX ADMIN — SODIUM CHLORIDE: 9 INJECTION, SOLUTION INTRAVENOUS at 12:07

## 2019-07-11 RX ADMIN — ATEZOLIZUMAB 1200 MG: 1200 INJECTION, SOLUTION INTRAVENOUS at 12:07

## 2019-07-11 NOTE — PLAN OF CARE
Problem: Fatigue (Oncology Care)  Goal: Improved Activity Tolerance  Outcome: Ongoing (interventions implemented as appropriate)  Tolerated infusion without difficulty.

## 2019-08-01 ENCOUNTER — INFUSION (OUTPATIENT)
Dept: INFUSION THERAPY | Facility: HOSPITAL | Age: 69
End: 2019-08-01
Attending: INTERNAL MEDICINE
Payer: MEDICARE

## 2019-08-01 VITALS
HEIGHT: 70 IN | WEIGHT: 208.31 LBS | BODY MASS INDEX: 29.82 KG/M2 | TEMPERATURE: 99 F | HEART RATE: 48 BPM | DIASTOLIC BLOOD PRESSURE: 76 MMHG | RESPIRATION RATE: 18 BRPM | SYSTOLIC BLOOD PRESSURE: 139 MMHG

## 2019-08-01 DIAGNOSIS — C64.9 RENAL CELL CARCINOMA, UNSPECIFIED LATERALITY: Primary | ICD-10-CM

## 2019-08-01 DIAGNOSIS — Z51.11 ENCOUNTER FOR ANTINEOPLASTIC CHEMOTHERAPY: ICD-10-CM

## 2019-08-01 DIAGNOSIS — C78.7 LIVER METASTASES: ICD-10-CM

## 2019-08-01 PROCEDURE — 63600175 PHARM REV CODE 636 W HCPCS: Mod: PN | Performed by: PHYSICIAN ASSISTANT

## 2019-08-01 PROCEDURE — 96413 CHEMO IV INFUSION 1 HR: CPT | Mod: PN

## 2019-08-01 PROCEDURE — 25000003 PHARM REV CODE 250: Mod: PN | Performed by: PHYSICIAN ASSISTANT

## 2019-08-01 PROCEDURE — A4216 STERILE WATER/SALINE, 10 ML: HCPCS | Mod: PN | Performed by: PHYSICIAN ASSISTANT

## 2019-08-01 RX ORDER — HEPARIN 100 UNIT/ML
500 SYRINGE INTRAVENOUS
Status: DISCONTINUED | OUTPATIENT
Start: 2019-08-01 | End: 2019-08-01 | Stop reason: HOSPADM

## 2019-08-01 RX ORDER — SODIUM CHLORIDE 0.9 % (FLUSH) 0.9 %
10 SYRINGE (ML) INJECTION
Status: DISCONTINUED | OUTPATIENT
Start: 2019-08-01 | End: 2019-08-01 | Stop reason: HOSPADM

## 2019-08-01 RX ADMIN — SODIUM CHLORIDE: 9 INJECTION, SOLUTION INTRAVENOUS at 01:08

## 2019-08-01 RX ADMIN — ATEZOLIZUMAB 1200 MG: 1200 INJECTION, SOLUTION INTRAVENOUS at 01:08

## 2019-08-01 RX ADMIN — HEPARIN 500 UNITS: 100 SYRINGE at 02:08

## 2019-08-01 RX ADMIN — Medication 10 ML: at 01:08

## 2019-08-01 NOTE — PLAN OF CARE
Problem: Fatigue  Goal: Improved Activity Tolerance    Intervention: Promote Energy Conservation  Pt in this shift for tecentriq. Pt has no complaints and states he feels well. Port accessed x1 attempt; positive blood return noted. All questions and concerns addressed at this time. Will continue to monitor.

## 2019-08-22 ENCOUNTER — INFUSION (OUTPATIENT)
Dept: INFUSION THERAPY | Facility: HOSPITAL | Age: 69
End: 2019-08-22
Attending: INTERNAL MEDICINE
Payer: MEDICARE

## 2019-08-22 VITALS
HEIGHT: 70 IN | HEART RATE: 50 BPM | DIASTOLIC BLOOD PRESSURE: 78 MMHG | OXYGEN SATURATION: 100 % | TEMPERATURE: 98 F | WEIGHT: 209 LBS | RESPIRATION RATE: 18 BRPM | SYSTOLIC BLOOD PRESSURE: 136 MMHG | BODY MASS INDEX: 29.92 KG/M2

## 2019-08-22 DIAGNOSIS — Z51.11 ENCOUNTER FOR ANTINEOPLASTIC CHEMOTHERAPY: ICD-10-CM

## 2019-08-22 DIAGNOSIS — C64.9 RENAL CELL CARCINOMA, UNSPECIFIED LATERALITY: Primary | ICD-10-CM

## 2019-08-22 DIAGNOSIS — C78.7 LIVER METASTASES: ICD-10-CM

## 2019-08-22 PROCEDURE — 25000003 PHARM REV CODE 250: Mod: PN | Performed by: PHYSICIAN ASSISTANT

## 2019-08-22 PROCEDURE — 63600175 PHARM REV CODE 636 W HCPCS: Mod: JG,PN | Performed by: PHYSICIAN ASSISTANT

## 2019-08-22 PROCEDURE — 96413 CHEMO IV INFUSION 1 HR: CPT | Mod: PN

## 2019-08-22 PROCEDURE — A4216 STERILE WATER/SALINE, 10 ML: HCPCS | Mod: PN | Performed by: PHYSICIAN ASSISTANT

## 2019-08-22 RX ORDER — HEPARIN 100 UNIT/ML
500 SYRINGE INTRAVENOUS
Status: DISCONTINUED | OUTPATIENT
Start: 2019-08-22 | End: 2019-08-22 | Stop reason: HOSPADM

## 2019-08-22 RX ORDER — SODIUM CHLORIDE 0.9 % (FLUSH) 0.9 %
10 SYRINGE (ML) INJECTION
Status: DISCONTINUED | OUTPATIENT
Start: 2019-08-22 | End: 2019-08-22 | Stop reason: HOSPADM

## 2019-08-22 RX ADMIN — Medication 10 ML: at 01:08

## 2019-08-22 RX ADMIN — SODIUM CHLORIDE: 900 INJECTION, SOLUTION INTRAVENOUS at 12:08

## 2019-08-22 RX ADMIN — HEPARIN 500 UNITS: 100 SYRINGE at 01:08

## 2019-08-22 RX ADMIN — ATEZOLIZUMAB 1200 MG: 1200 INJECTION, SOLUTION INTRAVENOUS at 01:08

## 2019-08-22 NOTE — PLAN OF CARE
Problem: Adult Inpatient Plan of Care  Goal: Plan of Care Review  Outcome: Ongoing (interventions implemented as appropriate)  Pt tolerated his infusion well, NAD, no new c/o voiced, pt refused his AVS, pt ambulated out of the clinic without difficulty accompanied by his wife.

## 2019-09-11 ENCOUNTER — INFUSION (OUTPATIENT)
Dept: INFUSION THERAPY | Facility: HOSPITAL | Age: 69
End: 2019-09-11
Attending: INTERNAL MEDICINE
Payer: MEDICARE

## 2019-09-11 VITALS — SYSTOLIC BLOOD PRESSURE: 124 MMHG | HEART RATE: 74 BPM | DIASTOLIC BLOOD PRESSURE: 68 MMHG

## 2019-09-11 DIAGNOSIS — C78.7 LIVER METASTASES: ICD-10-CM

## 2019-09-11 DIAGNOSIS — C64.9 RENAL CELL CARCINOMA, UNSPECIFIED LATERALITY: Primary | ICD-10-CM

## 2019-09-11 DIAGNOSIS — Z51.11 ENCOUNTER FOR ANTINEOPLASTIC CHEMOTHERAPY: ICD-10-CM

## 2019-09-11 PROCEDURE — 63600175 PHARM REV CODE 636 W HCPCS: Mod: PN | Performed by: PHYSICIAN ASSISTANT

## 2019-09-11 PROCEDURE — 96413 CHEMO IV INFUSION 1 HR: CPT | Mod: PN

## 2019-09-11 RX ORDER — SODIUM CHLORIDE 0.9 % (FLUSH) 0.9 %
10 SYRINGE (ML) INJECTION
Status: DISCONTINUED | OUTPATIENT
Start: 2019-09-11 | End: 2019-09-11 | Stop reason: HOSPADM

## 2019-09-11 RX ADMIN — ATEZOLIZUMAB 1200 MG: 1200 INJECTION, SOLUTION INTRAVENOUS at 03:09

## 2019-09-11 RX ADMIN — SODIUM CHLORIDE: 900 INJECTION, SOLUTION INTRAVENOUS at 03:09

## 2019-10-01 ENCOUNTER — INFUSION (OUTPATIENT)
Dept: INFUSION THERAPY | Facility: HOSPITAL | Age: 69
End: 2019-10-01
Attending: INTERNAL MEDICINE
Payer: MEDICARE

## 2019-10-01 ENCOUNTER — LAB VISIT (OUTPATIENT)
Dept: LAB | Facility: HOSPITAL | Age: 69
End: 2019-10-01
Attending: INTERNAL MEDICINE
Payer: MEDICARE

## 2019-10-01 VITALS
HEIGHT: 70 IN | RESPIRATION RATE: 16 BRPM | WEIGHT: 209 LBS | SYSTOLIC BLOOD PRESSURE: 149 MMHG | DIASTOLIC BLOOD PRESSURE: 72 MMHG | HEART RATE: 48 BPM | BODY MASS INDEX: 29.92 KG/M2 | TEMPERATURE: 97 F

## 2019-10-01 DIAGNOSIS — R53.83 FATIGUE DUE TO TREATMENT: ICD-10-CM

## 2019-10-01 DIAGNOSIS — C64.9 RENAL CELL CARCINOMA OF KIDNEY EXCLUDING RENAL PELVIS: ICD-10-CM

## 2019-10-01 DIAGNOSIS — C78.7 LIVER METASTASES: ICD-10-CM

## 2019-10-01 DIAGNOSIS — C64.9 RENAL CELL CARCINOMA, UNSPECIFIED LATERALITY: ICD-10-CM

## 2019-10-01 DIAGNOSIS — Z51.11 ENCOUNTER FOR ANTINEOPLASTIC CHEMOTHERAPY: ICD-10-CM

## 2019-10-01 DIAGNOSIS — E03.9 HYPOTHYROIDISM, UNSPECIFIED TYPE: ICD-10-CM

## 2019-10-01 DIAGNOSIS — C64.9 RENAL CELL CARCINOMA, UNSPECIFIED LATERALITY: Primary | ICD-10-CM

## 2019-10-01 LAB
ALBUMIN SERPL BCP-MCNC: 4.1 G/DL (ref 3.5–5.2)
ALP SERPL-CCNC: 173 U/L (ref 38–145)
ALT SERPL W/O P-5'-P-CCNC: 34 U/L (ref 10–44)
ANION GAP SERPL CALC-SCNC: 7 MMOL/L (ref 8–16)
AST SERPL-CCNC: 40 U/L (ref 17–59)
BASOPHILS # BLD AUTO: 0.03 K/UL (ref 0–0.2)
BASOPHILS NFR BLD: 0.6 % (ref 0–1.9)
BILIRUB SERPL-MCNC: 0.6 MG/DL (ref 0.2–1.3)
BUN SERPL-MCNC: 29 MG/DL (ref 9–21)
CALCIUM SERPL-MCNC: 9.2 MG/DL (ref 8.4–10.2)
CHLORIDE SERPL-SCNC: 106 MMOL/L (ref 95–110)
CO2 SERPL-SCNC: 26 MMOL/L (ref 22–31)
CREAT SERPL-MCNC: 1.76 MG/DL (ref 0.5–1.4)
DIFFERENTIAL METHOD: ABNORMAL
EOSINOPHIL # BLD AUTO: 0.1 K/UL (ref 0–0.5)
EOSINOPHIL NFR BLD: 1.5 % (ref 0–8)
ERYTHROCYTE [DISTWIDTH] IN BLOOD BY AUTOMATED COUNT: 13.9 % (ref 11.5–14.5)
EST. GFR  (AFRICAN AMERICAN): 45 ML/MIN/1.73 M^2
EST. GFR  (NON AFRICAN AMERICAN): 39 ML/MIN/1.73 M^2
GLUCOSE SERPL-MCNC: 79 MG/DL (ref 70–110)
HCT VFR BLD AUTO: 43 % (ref 40–54)
HGB BLD-MCNC: 14.2 G/DL (ref 14–18)
IMM GRANULOCYTES # BLD AUTO: 0.02 K/UL (ref 0–0.04)
IMM GRANULOCYTES NFR BLD AUTO: 0.4 % (ref 0–0.5)
LYMPHOCYTES # BLD AUTO: 1.7 K/UL (ref 1–4.8)
LYMPHOCYTES NFR BLD: 32.8 % (ref 18–48)
MCH RBC QN AUTO: 31.5 PG (ref 27–31)
MCHC RBC AUTO-ENTMCNC: 33 G/DL (ref 32–36)
MCV RBC AUTO: 95 FL (ref 82–98)
MONOCYTES # BLD AUTO: 0.7 K/UL (ref 0.3–1)
MONOCYTES NFR BLD: 13.2 % (ref 4–15)
NEUTROPHILS # BLD AUTO: 2.7 K/UL (ref 1.8–7.7)
NEUTROPHILS NFR BLD: 51.5 % (ref 38–73)
NRBC BLD-RTO: 0 /100 WBC
PLATELET # BLD AUTO: 120 K/UL (ref 150–350)
PMV BLD AUTO: 10.3 FL (ref 9.2–12.9)
POTASSIUM SERPL-SCNC: 4.7 MMOL/L (ref 3.5–5.1)
PROT SERPL-MCNC: 6.9 G/DL (ref 6–8.4)
RBC # BLD AUTO: 4.51 M/UL (ref 4.6–6.2)
SODIUM SERPL-SCNC: 139 MMOL/L (ref 136–145)
T4 FREE SP9 P CHAL SERPL-SCNC: 1.22 NG/DL (ref 0.78–2.19)
TSH SERPL DL<=0.005 MIU/L-ACNC: 14.4 UIU/ML (ref 0.4–4)
WBC # BLD AUTO: 5.22 K/UL (ref 3.9–12.7)

## 2019-10-01 PROCEDURE — 63600175 PHARM REV CODE 636 W HCPCS: Mod: JG,PN | Performed by: NURSE PRACTITIONER

## 2019-10-01 PROCEDURE — 80053 COMPREHEN METABOLIC PANEL: CPT | Mod: PN

## 2019-10-01 PROCEDURE — 80053 COMPREHEN METABOLIC PANEL: CPT

## 2019-10-01 PROCEDURE — 84443 ASSAY THYROID STIM HORMONE: CPT | Mod: PN

## 2019-10-01 PROCEDURE — 84439 ASSAY OF FREE THYROXINE: CPT

## 2019-10-01 PROCEDURE — 96413 CHEMO IV INFUSION 1 HR: CPT | Mod: PN

## 2019-10-01 PROCEDURE — 36415 COLL VENOUS BLD VENIPUNCTURE: CPT | Mod: PN

## 2019-10-01 PROCEDURE — 85025 COMPLETE CBC W/AUTO DIFF WBC: CPT | Mod: PN

## 2019-10-01 PROCEDURE — 85025 COMPLETE CBC W/AUTO DIFF WBC: CPT

## 2019-10-01 PROCEDURE — 84443 ASSAY THYROID STIM HORMONE: CPT

## 2019-10-01 PROCEDURE — 25000003 PHARM REV CODE 250: Mod: PN | Performed by: NURSE PRACTITIONER

## 2019-10-01 PROCEDURE — A4216 STERILE WATER/SALINE, 10 ML: HCPCS | Mod: PN | Performed by: NURSE PRACTITIONER

## 2019-10-01 PROCEDURE — 84439 ASSAY OF FREE THYROXINE: CPT | Mod: PN

## 2019-10-01 RX ORDER — HEPARIN 100 UNIT/ML
500 SYRINGE INTRAVENOUS
Status: DISCONTINUED | OUTPATIENT
Start: 2019-10-01 | End: 2019-10-01 | Stop reason: HOSPADM

## 2019-10-01 RX ORDER — SODIUM CHLORIDE 0.9 % (FLUSH) 0.9 %
10 SYRINGE (ML) INJECTION
Status: DISCONTINUED | OUTPATIENT
Start: 2019-10-01 | End: 2019-10-01 | Stop reason: HOSPADM

## 2019-10-01 RX ADMIN — Medication 10 ML: at 11:10

## 2019-10-01 RX ADMIN — HEPARIN 500 UNITS: 100 SYRINGE at 12:10

## 2019-10-01 RX ADMIN — ATEZOLIZUMAB 1200 MG: 1200 INJECTION, SOLUTION INTRAVENOUS at 11:10

## 2019-10-01 RX ADMIN — SODIUM CHLORIDE: 9 INJECTION, SOLUTION INTRAVENOUS at 11:10

## 2019-10-22 ENCOUNTER — INFUSION (OUTPATIENT)
Dept: INFUSION THERAPY | Facility: HOSPITAL | Age: 69
End: 2019-10-22
Attending: INTERNAL MEDICINE
Payer: MEDICARE

## 2019-10-22 VITALS
WEIGHT: 210 LBS | SYSTOLIC BLOOD PRESSURE: 126 MMHG | HEART RATE: 47 BPM | TEMPERATURE: 97 F | HEIGHT: 70 IN | DIASTOLIC BLOOD PRESSURE: 83 MMHG | RESPIRATION RATE: 18 BRPM | BODY MASS INDEX: 30.06 KG/M2

## 2019-10-22 DIAGNOSIS — C64.9 RENAL CELL CARCINOMA, UNSPECIFIED LATERALITY: Primary | ICD-10-CM

## 2019-10-22 DIAGNOSIS — C78.7 LIVER METASTASES: ICD-10-CM

## 2019-10-22 DIAGNOSIS — Z51.11 ENCOUNTER FOR ANTINEOPLASTIC CHEMOTHERAPY: ICD-10-CM

## 2019-10-22 PROCEDURE — 96413 CHEMO IV INFUSION 1 HR: CPT | Mod: PN

## 2019-10-22 PROCEDURE — 63600175 PHARM REV CODE 636 W HCPCS: Mod: JG,PN | Performed by: NURSE PRACTITIONER

## 2019-10-22 RX ORDER — SODIUM CHLORIDE 0.9 % (FLUSH) 0.9 %
10 SYRINGE (ML) INJECTION
Status: DISCONTINUED | OUTPATIENT
Start: 2019-10-22 | End: 2019-10-22 | Stop reason: HOSPADM

## 2019-10-22 RX ADMIN — SODIUM CHLORIDE: 9 INJECTION, SOLUTION INTRAVENOUS at 01:10

## 2019-10-22 RX ADMIN — ATEZOLIZUMAB 1200 MG: 1200 INJECTION, SOLUTION INTRAVENOUS at 01:10

## 2019-11-11 ENCOUNTER — LAB VISIT (OUTPATIENT)
Dept: LAB | Facility: HOSPITAL | Age: 69
End: 2019-11-11
Attending: INTERNAL MEDICINE
Payer: MEDICARE

## 2019-11-11 DIAGNOSIS — C64.9 RENAL CELL CARCINOMA, UNSPECIFIED LATERALITY: ICD-10-CM

## 2019-11-11 DIAGNOSIS — C64.9 RENAL CELL CARCINOMA OF KIDNEY EXCLUDING RENAL PELVIS: ICD-10-CM

## 2019-11-11 DIAGNOSIS — E03.9 HYPOTHYROIDISM, UNSPECIFIED TYPE: ICD-10-CM

## 2019-11-11 LAB
ALBUMIN SERPL BCP-MCNC: 4.2 G/DL (ref 3.5–5.2)
ALP SERPL-CCNC: 160 U/L (ref 38–145)
ALT SERPL W/O P-5'-P-CCNC: 40 U/L (ref 10–44)
ANION GAP SERPL CALC-SCNC: 9 MMOL/L (ref 8–16)
AST SERPL-CCNC: 42 U/L (ref 17–59)
BASOPHILS # BLD AUTO: 0.01 K/UL (ref 0–0.2)
BASOPHILS NFR BLD: 0.2 % (ref 0–1.9)
BILIRUB SERPL-MCNC: 0.5 MG/DL (ref 0.2–1.3)
BUN SERPL-MCNC: 27 MG/DL (ref 9–21)
CALCIUM SERPL-MCNC: 9.4 MG/DL (ref 8.4–10.2)
CHLORIDE SERPL-SCNC: 107 MMOL/L (ref 95–110)
CO2 SERPL-SCNC: 25 MMOL/L (ref 22–31)
CREAT SERPL-MCNC: 1.76 MG/DL (ref 0.5–1.4)
DIFFERENTIAL METHOD: ABNORMAL
EOSINOPHIL # BLD AUTO: 0.1 K/UL (ref 0–0.5)
EOSINOPHIL NFR BLD: 0.9 % (ref 0–8)
ERYTHROCYTE [DISTWIDTH] IN BLOOD BY AUTOMATED COUNT: 13.6 % (ref 11.5–14.5)
EST. GFR  (AFRICAN AMERICAN): 45 ML/MIN/1.73 M^2
EST. GFR  (NON AFRICAN AMERICAN): 39 ML/MIN/1.73 M^2
GLUCOSE SERPL-MCNC: 100 MG/DL (ref 70–110)
HCT VFR BLD AUTO: 44.5 % (ref 40–54)
HGB BLD-MCNC: 14.6 G/DL (ref 14–18)
IMM GRANULOCYTES # BLD AUTO: 0.02 K/UL (ref 0–0.04)
IMM GRANULOCYTES NFR BLD AUTO: 0.3 % (ref 0–0.5)
LYMPHOCYTES # BLD AUTO: 2 K/UL (ref 1–4.8)
LYMPHOCYTES NFR BLD: 31.2 % (ref 18–48)
MCH RBC QN AUTO: 31.3 PG (ref 27–31)
MCHC RBC AUTO-ENTMCNC: 32.8 G/DL (ref 32–36)
MCV RBC AUTO: 95 FL (ref 82–98)
MONOCYTES # BLD AUTO: 0.7 K/UL (ref 0.3–1)
MONOCYTES NFR BLD: 11.4 % (ref 4–15)
NEUTROPHILS # BLD AUTO: 3.6 K/UL (ref 1.8–7.7)
NEUTROPHILS NFR BLD: 56 % (ref 38–73)
NRBC BLD-RTO: 0 /100 WBC
PLATELET # BLD AUTO: 125 K/UL (ref 150–350)
PMV BLD AUTO: 10.3 FL (ref 9.2–12.9)
POTASSIUM SERPL-SCNC: 5.2 MMOL/L (ref 3.5–5.1)
PROT SERPL-MCNC: 7.1 G/DL (ref 6–8.4)
RBC # BLD AUTO: 4.67 M/UL (ref 4.6–6.2)
SODIUM SERPL-SCNC: 141 MMOL/L (ref 136–145)
T4 FREE SP9 P CHAL SERPL-SCNC: 1.09 NG/DL (ref 0.78–2.19)
TSH SERPL DL<=0.005 MIU/L-ACNC: 13.7 UIU/ML (ref 0.4–4)
WBC # BLD AUTO: 6.5 K/UL (ref 3.9–12.7)

## 2019-11-11 PROCEDURE — 85025 COMPLETE CBC W/AUTO DIFF WBC: CPT | Mod: PN

## 2019-11-11 PROCEDURE — 84439 ASSAY OF FREE THYROXINE: CPT

## 2019-11-11 PROCEDURE — 84439 ASSAY OF FREE THYROXINE: CPT | Mod: PN

## 2019-11-11 PROCEDURE — 84443 ASSAY THYROID STIM HORMONE: CPT | Mod: PN

## 2019-11-11 PROCEDURE — 80053 COMPREHEN METABOLIC PANEL: CPT | Mod: PN

## 2019-11-11 PROCEDURE — 36415 COLL VENOUS BLD VENIPUNCTURE: CPT | Mod: PN

## 2019-11-11 PROCEDURE — 80053 COMPREHEN METABOLIC PANEL: CPT

## 2019-11-11 PROCEDURE — 84443 ASSAY THYROID STIM HORMONE: CPT

## 2019-11-11 PROCEDURE — 85025 COMPLETE CBC W/AUTO DIFF WBC: CPT

## 2019-11-12 ENCOUNTER — INFUSION (OUTPATIENT)
Dept: INFUSION THERAPY | Facility: HOSPITAL | Age: 69
End: 2019-11-12
Attending: INTERNAL MEDICINE
Payer: MEDICARE

## 2019-11-12 VITALS
SYSTOLIC BLOOD PRESSURE: 116 MMHG | HEART RATE: 62 BPM | WEIGHT: 212 LBS | TEMPERATURE: 98 F | RESPIRATION RATE: 18 BRPM | OXYGEN SATURATION: 97 % | HEIGHT: 71 IN | DIASTOLIC BLOOD PRESSURE: 62 MMHG | BODY MASS INDEX: 29.68 KG/M2

## 2019-11-12 DIAGNOSIS — C64.9 RENAL CELL CARCINOMA, UNSPECIFIED LATERALITY: Primary | ICD-10-CM

## 2019-11-12 DIAGNOSIS — Z51.11 ENCOUNTER FOR ANTINEOPLASTIC CHEMOTHERAPY: ICD-10-CM

## 2019-11-12 DIAGNOSIS — C78.7 LIVER METASTASES: ICD-10-CM

## 2019-11-12 PROCEDURE — 96413 CHEMO IV INFUSION 1 HR: CPT | Mod: PN

## 2019-11-12 PROCEDURE — 63600175 PHARM REV CODE 636 W HCPCS: Mod: PN | Performed by: PHYSICIAN ASSISTANT

## 2019-11-12 RX ORDER — SODIUM CHLORIDE 0.9 % (FLUSH) 0.9 %
10 SYRINGE (ML) INJECTION
Status: DISCONTINUED | OUTPATIENT
Start: 2019-11-12 | End: 2019-11-12 | Stop reason: HOSPADM

## 2019-11-12 RX ORDER — HEPARIN 100 UNIT/ML
500 SYRINGE INTRAVENOUS
Status: DISCONTINUED | OUTPATIENT
Start: 2019-11-12 | End: 2019-11-12 | Stop reason: HOSPADM

## 2019-11-12 RX ADMIN — HEPARIN 500 UNITS: 100 SYRINGE at 12:11

## 2019-11-12 RX ADMIN — ATEZOLIZUMAB 1200 MG: 1200 INJECTION, SOLUTION INTRAVENOUS at 12:11

## 2019-11-12 RX ADMIN — SODIUM CHLORIDE: 9 INJECTION, SOLUTION INTRAVENOUS at 11:11

## 2019-11-12 NOTE — PLAN OF CARE
Tolerated infusion well today  Discharge instructions given Verbally acknowledged understanding  copy of scheduled and lab results  Ambulated to private vehicle without difficulty  NAD

## 2019-11-12 NOTE — PLAN OF CARE
Yecenia presented to infusion suite for treatment.  Assessment completed and POC discussed  Verbally acknowledged understanding

## 2019-11-26 ENCOUNTER — DOCUMENTATION ONLY (OUTPATIENT)
Dept: INFUSION THERAPY | Facility: HOSPITAL | Age: 69
End: 2019-11-26

## 2019-11-26 ENCOUNTER — TELEPHONE (OUTPATIENT)
Dept: INFUSION THERAPY | Facility: HOSPITAL | Age: 69
End: 2019-11-26

## 2019-11-26 NOTE — PROGRESS NOTES
Spoke with rito patient wife gave her lab and md appointment for 12/20/19 and infusion jerry for 12/23/19 per elvis appointments changed accordingly.

## 2019-11-26 NOTE — TELEPHONE ENCOUNTER
Spoke with patient's wife they are going out of town for [place] and needs appointments on Monday 12/23/19

## 2019-11-26 NOTE — TELEPHONE ENCOUNTER
----- Message from Quin Gillette sent at 11/26/2019  9:47 AM CST -----  Caller: Patient                  Callback number: [phone]                       Reason: Request a call to discuss patients immunotherapy apt schedule for [place] [facility]                  Thank you

## 2019-12-02 ENCOUNTER — LAB VISIT (OUTPATIENT)
Dept: LAB | Facility: HOSPITAL | Age: 69
End: 2019-12-02
Attending: INTERNAL MEDICINE
Payer: MEDICARE

## 2019-12-02 DIAGNOSIS — C64.9 RENAL CELL CARCINOMA OF KIDNEY EXCLUDING RENAL PELVIS: ICD-10-CM

## 2019-12-02 LAB
ALBUMIN SERPL BCP-MCNC: 4.1 G/DL (ref 3.5–5.2)
ALP SERPL-CCNC: 113 U/L (ref 38–145)
ALT SERPL W/O P-5'-P-CCNC: 41 U/L (ref 10–44)
ANION GAP SERPL CALC-SCNC: 9 MMOL/L (ref 8–16)
AST SERPL-CCNC: 46 U/L (ref 17–59)
BASOPHILS # BLD AUTO: 0.02 K/UL (ref 0–0.2)
BASOPHILS NFR BLD: 0.3 % (ref 0–1.9)
BILIRUB SERPL-MCNC: 1.1 MG/DL (ref 0.2–1.3)
BUN SERPL-MCNC: 27 MG/DL (ref 9–21)
CALCIUM SERPL-MCNC: 8.9 MG/DL (ref 8.4–10.2)
CHLORIDE SERPL-SCNC: 108 MMOL/L (ref 95–110)
CO2 SERPL-SCNC: 24 MMOL/L (ref 22–31)
CREAT SERPL-MCNC: 1.74 MG/DL (ref 0.5–1.4)
DIFFERENTIAL METHOD: ABNORMAL
EOSINOPHIL # BLD AUTO: 0.1 K/UL (ref 0–0.5)
EOSINOPHIL NFR BLD: 1.7 % (ref 0–8)
ERYTHROCYTE [DISTWIDTH] IN BLOOD BY AUTOMATED COUNT: 13.3 % (ref 11.5–14.5)
EST. GFR  (AFRICAN AMERICAN): 45 ML/MIN/1.73 M^2
EST. GFR  (NON AFRICAN AMERICAN): 39 ML/MIN/1.73 M^2
GLUCOSE SERPL-MCNC: 110 MG/DL (ref 70–110)
HCT VFR BLD AUTO: 45.4 % (ref 40–54)
HGB BLD-MCNC: 14.5 G/DL (ref 14–18)
IMM GRANULOCYTES # BLD AUTO: 0.01 K/UL (ref 0–0.04)
IMM GRANULOCYTES NFR BLD AUTO: 0.2 % (ref 0–0.5)
LYMPHOCYTES # BLD AUTO: 1.7 K/UL (ref 1–4.8)
LYMPHOCYTES NFR BLD: 28.4 % (ref 18–48)
MCH RBC QN AUTO: 31 PG (ref 27–31)
MCHC RBC AUTO-ENTMCNC: 31.9 G/DL (ref 32–36)
MCV RBC AUTO: 97 FL (ref 82–98)
MONOCYTES # BLD AUTO: 0.6 K/UL (ref 0.3–1)
MONOCYTES NFR BLD: 10 % (ref 4–15)
NEUTROPHILS # BLD AUTO: 3.5 K/UL (ref 1.8–7.7)
NEUTROPHILS NFR BLD: 59.4 % (ref 38–73)
NRBC BLD-RTO: 0 /100 WBC
PLATELET # BLD AUTO: 128 K/UL (ref 150–350)
PMV BLD AUTO: 10.4 FL (ref 9.2–12.9)
POTASSIUM SERPL-SCNC: 4.9 MMOL/L (ref 3.5–5.1)
PROT SERPL-MCNC: 7.3 G/DL (ref 6–8.4)
RBC # BLD AUTO: 4.68 M/UL (ref 4.6–6.2)
SODIUM SERPL-SCNC: 141 MMOL/L (ref 136–145)
WBC # BLD AUTO: 5.92 K/UL (ref 3.9–12.7)

## 2019-12-02 PROCEDURE — 80053 COMPREHEN METABOLIC PANEL: CPT | Mod: PN

## 2019-12-02 PROCEDURE — 36415 COLL VENOUS BLD VENIPUNCTURE: CPT | Mod: PN

## 2019-12-02 PROCEDURE — 85025 COMPLETE CBC W/AUTO DIFF WBC: CPT

## 2019-12-02 PROCEDURE — 85025 COMPLETE CBC W/AUTO DIFF WBC: CPT | Mod: PN

## 2019-12-02 PROCEDURE — 80053 COMPREHEN METABOLIC PANEL: CPT

## 2019-12-03 ENCOUNTER — INFUSION (OUTPATIENT)
Dept: INFUSION THERAPY | Facility: HOSPITAL | Age: 69
End: 2019-12-03
Attending: INTERNAL MEDICINE
Payer: MEDICARE

## 2019-12-03 VITALS
BODY MASS INDEX: 30.68 KG/M2 | TEMPERATURE: 98 F | SYSTOLIC BLOOD PRESSURE: 132 MMHG | DIASTOLIC BLOOD PRESSURE: 71 MMHG | RESPIRATION RATE: 18 BRPM | WEIGHT: 214.31 LBS | HEART RATE: 86 BPM | HEIGHT: 70 IN

## 2019-12-03 DIAGNOSIS — C64.9 RENAL CELL CARCINOMA, UNSPECIFIED LATERALITY: Primary | ICD-10-CM

## 2019-12-03 DIAGNOSIS — C78.7 LIVER METASTASES: ICD-10-CM

## 2019-12-03 DIAGNOSIS — Z51.11 ENCOUNTER FOR ANTINEOPLASTIC CHEMOTHERAPY: ICD-10-CM

## 2019-12-03 PROCEDURE — 63600175 PHARM REV CODE 636 W HCPCS: Mod: PN | Performed by: PHYSICIAN ASSISTANT

## 2019-12-03 PROCEDURE — 96413 CHEMO IV INFUSION 1 HR: CPT | Mod: PN

## 2019-12-03 RX ORDER — HEPARIN 100 UNIT/ML
500 SYRINGE INTRAVENOUS
Status: DISCONTINUED | OUTPATIENT
Start: 2019-12-03 | End: 2019-12-03 | Stop reason: HOSPADM

## 2019-12-03 RX ORDER — SODIUM CHLORIDE 0.9 % (FLUSH) 0.9 %
10 SYRINGE (ML) INJECTION
Status: DISCONTINUED | OUTPATIENT
Start: 2019-12-03 | End: 2019-12-03 | Stop reason: HOSPADM

## 2019-12-03 RX ADMIN — ATEZOLIZUMAB 1200 MG: 1200 INJECTION, SOLUTION INTRAVENOUS at 12:12

## 2019-12-03 RX ADMIN — SODIUM CHLORIDE: 900 INJECTION, SOLUTION INTRAVENOUS at 12:12

## 2019-12-06 NOTE — Clinical Note
Please arrange for home health to do a weekly CBc and CMP AT HOME. See me with same labs in 4 weeks. EOMI, sclera clear

## 2019-12-23 ENCOUNTER — INFUSION (OUTPATIENT)
Dept: INFUSION THERAPY | Facility: HOSPITAL | Age: 69
End: 2019-12-23
Attending: INTERNAL MEDICINE
Payer: MEDICARE

## 2019-12-23 ENCOUNTER — LAB VISIT (OUTPATIENT)
Dept: LAB | Facility: HOSPITAL | Age: 69
End: 2019-12-23
Attending: NURSE PRACTITIONER
Payer: MEDICARE

## 2019-12-23 VITALS — DIASTOLIC BLOOD PRESSURE: 85 MMHG | SYSTOLIC BLOOD PRESSURE: 138 MMHG | RESPIRATION RATE: 18 BRPM | HEART RATE: 87 BPM

## 2019-12-23 DIAGNOSIS — C64.9 RENAL CELL CARCINOMA, UNSPECIFIED LATERALITY: Primary | ICD-10-CM

## 2019-12-23 DIAGNOSIS — E03.9 HYPOTHYROIDISM, UNSPECIFIED TYPE: ICD-10-CM

## 2019-12-23 DIAGNOSIS — Z51.11 ENCOUNTER FOR ANTINEOPLASTIC CHEMOTHERAPY: ICD-10-CM

## 2019-12-23 DIAGNOSIS — C78.7 LIVER METASTASES: ICD-10-CM

## 2019-12-23 DIAGNOSIS — C64.9 RENAL CELL CARCINOMA, UNSPECIFIED LATERALITY: ICD-10-CM

## 2019-12-23 DIAGNOSIS — C64.9 RENAL CELL CARCINOMA OF KIDNEY EXCLUDING RENAL PELVIS: ICD-10-CM

## 2019-12-23 LAB
ALBUMIN SERPL BCP-MCNC: 4.1 G/DL (ref 3.5–5.2)
ALP SERPL-CCNC: 151 U/L (ref 38–145)
ALT SERPL W/O P-5'-P-CCNC: 38 U/L (ref 10–44)
ANION GAP SERPL CALC-SCNC: 10 MMOL/L (ref 8–16)
AST SERPL-CCNC: 41 U/L (ref 17–59)
BASOPHILS # BLD AUTO: 0.02 K/UL (ref 0–0.2)
BASOPHILS NFR BLD: 0.3 % (ref 0–1.9)
BILIRUB SERPL-MCNC: 0.6 MG/DL (ref 0.2–1.3)
BUN SERPL-MCNC: 27 MG/DL (ref 9–21)
CALCIUM SERPL-MCNC: 9.2 MG/DL (ref 8.4–10.2)
CHLORIDE SERPL-SCNC: 107 MMOL/L (ref 95–110)
CO2 SERPL-SCNC: 25 MMOL/L (ref 22–31)
CREAT SERPL-MCNC: 1.87 MG/DL (ref 0.5–1.4)
DIFFERENTIAL METHOD: ABNORMAL
EOSINOPHIL # BLD AUTO: 0.1 K/UL (ref 0–0.5)
EOSINOPHIL NFR BLD: 1 % (ref 0–8)
ERYTHROCYTE [DISTWIDTH] IN BLOOD BY AUTOMATED COUNT: 13.2 % (ref 11.5–14.5)
EST. GFR  (AFRICAN AMERICAN): 41 ML/MIN/1.73 M^2
EST. GFR  (NON AFRICAN AMERICAN): 36 ML/MIN/1.73 M^2
GLUCOSE SERPL-MCNC: 101 MG/DL (ref 70–110)
HCT VFR BLD AUTO: 43.2 % (ref 40–54)
HGB BLD-MCNC: 14.2 G/DL (ref 14–18)
IMM GRANULOCYTES # BLD AUTO: 0.02 K/UL (ref 0–0.04)
IMM GRANULOCYTES NFR BLD AUTO: 0.3 % (ref 0–0.5)
LYMPHOCYTES # BLD AUTO: 1.7 K/UL (ref 1–4.8)
LYMPHOCYTES NFR BLD: 28.3 % (ref 18–48)
MCH RBC QN AUTO: 31.4 PG (ref 27–31)
MCHC RBC AUTO-ENTMCNC: 32.9 G/DL (ref 32–36)
MCV RBC AUTO: 96 FL (ref 82–98)
MONOCYTES # BLD AUTO: 0.6 K/UL (ref 0.3–1)
MONOCYTES NFR BLD: 10.1 % (ref 4–15)
NEUTROPHILS # BLD AUTO: 3.6 K/UL (ref 1.8–7.7)
NEUTROPHILS NFR BLD: 60 % (ref 38–73)
NRBC BLD-RTO: 0 /100 WBC
PLATELET # BLD AUTO: 114 K/UL (ref 150–350)
PMV BLD AUTO: 10.3 FL (ref 9.2–12.9)
POTASSIUM SERPL-SCNC: 4.7 MMOL/L (ref 3.5–5.1)
PROT SERPL-MCNC: 7.1 G/DL (ref 6–8.4)
RBC # BLD AUTO: 4.52 M/UL (ref 4.6–6.2)
SODIUM SERPL-SCNC: 142 MMOL/L (ref 136–145)
T4 FREE SP9 P CHAL SERPL-SCNC: 1.1 NG/DL (ref 0.78–2.19)
TSH SERPL DL<=0.005 MIU/L-ACNC: 13.2 UIU/ML (ref 0.4–4)
WBC # BLD AUTO: 6.01 K/UL (ref 3.9–12.7)

## 2019-12-23 PROCEDURE — 63600175 PHARM REV CODE 636 W HCPCS: Mod: PN | Performed by: PHYSICIAN ASSISTANT

## 2019-12-23 PROCEDURE — 85025 COMPLETE CBC W/AUTO DIFF WBC: CPT | Mod: PN

## 2019-12-23 PROCEDURE — 80053 COMPREHEN METABOLIC PANEL: CPT | Mod: PN

## 2019-12-23 PROCEDURE — 36415 COLL VENOUS BLD VENIPUNCTURE: CPT | Mod: PN

## 2019-12-23 PROCEDURE — 96413 CHEMO IV INFUSION 1 HR: CPT | Mod: PN

## 2019-12-23 PROCEDURE — 84439 ASSAY OF FREE THYROXINE: CPT | Mod: PN

## 2019-12-23 PROCEDURE — 84439 ASSAY OF FREE THYROXINE: CPT

## 2019-12-23 PROCEDURE — 84443 ASSAY THYROID STIM HORMONE: CPT

## 2019-12-23 PROCEDURE — 84443 ASSAY THYROID STIM HORMONE: CPT | Mod: PN

## 2019-12-23 PROCEDURE — 85025 COMPLETE CBC W/AUTO DIFF WBC: CPT

## 2019-12-23 PROCEDURE — 80053 COMPREHEN METABOLIC PANEL: CPT

## 2019-12-23 RX ORDER — SODIUM CHLORIDE 0.9 % (FLUSH) 0.9 %
10 SYRINGE (ML) INJECTION
Status: DISCONTINUED | OUTPATIENT
Start: 2019-12-23 | End: 2019-12-23 | Stop reason: HOSPADM

## 2019-12-23 RX ORDER — HEPARIN 100 UNIT/ML
500 SYRINGE INTRAVENOUS
Status: DISCONTINUED | OUTPATIENT
Start: 2019-12-23 | End: 2019-12-23 | Stop reason: HOSPADM

## 2019-12-23 RX ADMIN — HEPARIN 500 UNITS: 100 SYRINGE at 10:12

## 2019-12-23 RX ADMIN — SODIUM CHLORIDE: 9 INJECTION, SOLUTION INTRAVENOUS at 09:12

## 2019-12-23 RX ADMIN — ATEZOLIZUMAB 1200 MG: 1200 INJECTION, SOLUTION INTRAVENOUS at 10:12

## 2019-12-28 NOTE — PLAN OF CARE
Problem: Adult Inpatient Plan of Care  Goal: Plan of Care Review  Outcome: Ongoing (interventions implemented as appropriate)  Plan of care reviewed with patient; patient verbalizes understanding. Medications reviewed and administered as ordered. VSS.        8

## 2020-01-13 ENCOUNTER — INFUSION (OUTPATIENT)
Dept: INFUSION THERAPY | Facility: HOSPITAL | Age: 70
End: 2020-01-13
Attending: INTERNAL MEDICINE
Payer: MEDICARE

## 2020-01-13 VITALS
RESPIRATION RATE: 18 BRPM | BODY MASS INDEX: 30.64 KG/M2 | OXYGEN SATURATION: 99 % | TEMPERATURE: 97 F | HEART RATE: 48 BPM | DIASTOLIC BLOOD PRESSURE: 80 MMHG | HEIGHT: 70 IN | SYSTOLIC BLOOD PRESSURE: 129 MMHG | WEIGHT: 214 LBS

## 2020-01-13 DIAGNOSIS — C78.7 LIVER METASTASES: ICD-10-CM

## 2020-01-13 DIAGNOSIS — Z51.11 ENCOUNTER FOR ANTINEOPLASTIC CHEMOTHERAPY: ICD-10-CM

## 2020-01-13 DIAGNOSIS — C64.9 RENAL CELL CARCINOMA, UNSPECIFIED LATERALITY: Primary | ICD-10-CM

## 2020-01-13 PROCEDURE — 63600175 PHARM REV CODE 636 W HCPCS: Mod: PN | Performed by: NURSE PRACTITIONER

## 2020-01-13 PROCEDURE — 96413 CHEMO IV INFUSION 1 HR: CPT | Mod: PN

## 2020-01-13 PROCEDURE — A4216 STERILE WATER/SALINE, 10 ML: HCPCS | Mod: PN | Performed by: NURSE PRACTITIONER

## 2020-01-13 PROCEDURE — 25000003 PHARM REV CODE 250: Mod: PN | Performed by: NURSE PRACTITIONER

## 2020-01-13 RX ORDER — HEPARIN 100 UNIT/ML
500 SYRINGE INTRAVENOUS
Status: DISCONTINUED | OUTPATIENT
Start: 2020-01-13 | End: 2020-01-13 | Stop reason: HOSPADM

## 2020-01-13 RX ORDER — SODIUM CHLORIDE 0.9 % (FLUSH) 0.9 %
10 SYRINGE (ML) INJECTION
Status: DISCONTINUED | OUTPATIENT
Start: 2020-01-13 | End: 2020-01-13 | Stop reason: HOSPADM

## 2020-01-13 RX ADMIN — Medication 10 ML: at 10:01

## 2020-01-13 RX ADMIN — HEPARIN 500 UNITS: 100 SYRINGE at 10:01

## 2020-01-13 RX ADMIN — ATEZOLIZUMAB 1200 MG: 1200 INJECTION, SOLUTION INTRAVENOUS at 10:01

## 2020-01-13 RX ADMIN — SODIUM CHLORIDE: 900 INJECTION, SOLUTION INTRAVENOUS at 09:01

## 2020-01-13 NOTE — PLAN OF CARE
Problem: Fatigue (Oncology Care)  Goal: Improved Activity Tolerance  Intervention: Promote Energy Conservation  Flowsheets (Taken 1/13/2020 0955)  Fatigue Management: frequent rest breaks encouraged; activity schedule adjusted; paced activity encouraged; fatigue-related activity identified  Sleep/Rest Enhancement: regular sleep/rest pattern promoted  Activity Management: ambulated in hernandez - L4     Problem: Adult Inpatient Plan of Care  Goal: Patient-Specific Goal (Individualization)  Outcome: Ongoing, Progressing  Flowsheets (Taken 1/13/2020 0955)  Individualized Care Needs: recliner, warm blanket  Anxieties, Fears or Concerns: none  Patient-Specific Goals (Include Timeframe): infection prevention during treatment

## 2020-01-15 PROBLEM — Z80.0 FAMILY HISTORY OF COLON CANCER: Status: ACTIVE | Noted: 2020-01-15

## 2020-02-03 ENCOUNTER — INFUSION (OUTPATIENT)
Dept: INFUSION THERAPY | Facility: HOSPITAL | Age: 70
End: 2020-02-03
Attending: NURSE PRACTITIONER
Payer: MEDICARE

## 2020-02-03 VITALS
RESPIRATION RATE: 18 BRPM | WEIGHT: 216.94 LBS | DIASTOLIC BLOOD PRESSURE: 80 MMHG | SYSTOLIC BLOOD PRESSURE: 143 MMHG | HEART RATE: 85 BPM | TEMPERATURE: 98 F | HEIGHT: 70 IN | BODY MASS INDEX: 31.06 KG/M2

## 2020-02-03 DIAGNOSIS — Z51.11 ENCOUNTER FOR ANTINEOPLASTIC CHEMOTHERAPY: ICD-10-CM

## 2020-02-03 DIAGNOSIS — C78.7 LIVER METASTASES: ICD-10-CM

## 2020-02-03 DIAGNOSIS — C64.9 RENAL CELL CARCINOMA, UNSPECIFIED LATERALITY: Primary | ICD-10-CM

## 2020-02-03 PROCEDURE — 96413 CHEMO IV INFUSION 1 HR: CPT | Mod: PN

## 2020-02-03 PROCEDURE — 63600175 PHARM REV CODE 636 W HCPCS: Mod: PN | Performed by: PHYSICIAN ASSISTANT

## 2020-02-03 RX ORDER — SODIUM CHLORIDE 0.9 % (FLUSH) 0.9 %
10 SYRINGE (ML) INJECTION
Status: DISCONTINUED | OUTPATIENT
Start: 2020-02-03 | End: 2020-02-03 | Stop reason: HOSPADM

## 2020-02-03 RX ORDER — HEPARIN 100 UNIT/ML
500 SYRINGE INTRAVENOUS
Status: DISCONTINUED | OUTPATIENT
Start: 2020-02-03 | End: 2020-02-03 | Stop reason: HOSPADM

## 2020-02-03 RX ADMIN — ATEZOLIZUMAB 1200 MG: 1200 INJECTION, SOLUTION INTRAVENOUS at 10:02

## 2020-02-03 RX ADMIN — SODIUM CHLORIDE: 9 INJECTION, SOLUTION INTRAVENOUS at 09:02

## 2020-02-03 RX ADMIN — HEPARIN 500 UNITS: 100 SYRINGE at 10:02

## 2020-02-03 NOTE — PLAN OF CARE
Pt tolerated treatment well today. Vitals remain stable. Reviewed follow-up appointments. All questions were answered, ambulated independently at d/c.

## 2020-02-03 NOTE — PLAN OF CARE
Problem: Adult Inpatient Plan of Care  Goal: Plan of Care Review  Outcome: Ongoing, Progressing  Flowsheets (Taken 2/3/2020 1116)  Plan of Care Reviewed With: patient  Goal: Patient-Specific Goal (Individualization)  Outcome: Ongoing, Progressing     Problem: Fatigue (Oncology Care)  Goal: Improved Activity Tolerance  Outcome: Ongoing, Progressing  Intervention: Promote Energy Conservation  Flowsheets (Taken 2/3/2020 1116)  Fatigue Management: frequent rest breaks encouraged; fatigue-related activity identified  Sleep/Rest Enhancement: relaxation techniques promoted  Activity Management: activity encouraged

## 2020-02-24 ENCOUNTER — INFUSION (OUTPATIENT)
Dept: INFUSION THERAPY | Facility: HOSPITAL | Age: 70
End: 2020-02-24
Attending: NURSE PRACTITIONER
Payer: MEDICARE

## 2020-02-24 VITALS
DIASTOLIC BLOOD PRESSURE: 65 MMHG | WEIGHT: 214 LBS | HEIGHT: 70 IN | RESPIRATION RATE: 14 BRPM | SYSTOLIC BLOOD PRESSURE: 123 MMHG | BODY MASS INDEX: 30.64 KG/M2 | TEMPERATURE: 97 F | HEART RATE: 59 BPM

## 2020-02-24 DIAGNOSIS — C64.9 RENAL CELL CARCINOMA, UNSPECIFIED LATERALITY: Primary | ICD-10-CM

## 2020-02-24 DIAGNOSIS — Z51.11 ENCOUNTER FOR ANTINEOPLASTIC CHEMOTHERAPY: ICD-10-CM

## 2020-02-24 DIAGNOSIS — C78.7 LIVER METASTASES: ICD-10-CM

## 2020-02-24 PROCEDURE — 25000003 PHARM REV CODE 250: Mod: PN | Performed by: PHYSICIAN ASSISTANT

## 2020-02-24 PROCEDURE — A4216 STERILE WATER/SALINE, 10 ML: HCPCS | Mod: PN | Performed by: PHYSICIAN ASSISTANT

## 2020-02-24 PROCEDURE — 63600175 PHARM REV CODE 636 W HCPCS: Mod: JG,PN | Performed by: PHYSICIAN ASSISTANT

## 2020-02-24 PROCEDURE — 96413 CHEMO IV INFUSION 1 HR: CPT | Mod: PN

## 2020-02-24 RX ORDER — HEPARIN 100 UNIT/ML
500 SYRINGE INTRAVENOUS
Status: DISCONTINUED | OUTPATIENT
Start: 2020-02-24 | End: 2020-02-24 | Stop reason: HOSPADM

## 2020-02-24 RX ORDER — SODIUM CHLORIDE 0.9 % (FLUSH) 0.9 %
10 SYRINGE (ML) INJECTION
Status: DISCONTINUED | OUTPATIENT
Start: 2020-02-24 | End: 2020-02-24 | Stop reason: HOSPADM

## 2020-02-24 RX ADMIN — SODIUM CHLORIDE: 9 INJECTION, SOLUTION INTRAVENOUS at 11:02

## 2020-02-24 RX ADMIN — ATEZOLIZUMAB 1200 MG: 1200 INJECTION, SOLUTION INTRAVENOUS at 11:02

## 2020-02-24 RX ADMIN — Medication 10 ML: at 12:02

## 2020-02-24 RX ADMIN — HEPARIN 500 UNITS: 100 SYRINGE at 12:02

## 2020-02-24 NOTE — PLAN OF CARE
Problem: Fatigue (Oncology Care)  Goal: Improved Activity Tolerance  Outcome: Ongoing, Progressing     Patient tolerated treatment without any complications. PAC hep locked. D/C VS taken and within normal range. Patient aware of next appt date and time. Patient leaving with ind gait.

## 2020-03-16 ENCOUNTER — INFUSION (OUTPATIENT)
Dept: INFUSION THERAPY | Facility: HOSPITAL | Age: 70
End: 2020-03-16
Attending: NURSE PRACTITIONER
Payer: MEDICARE

## 2020-03-16 VITALS
DIASTOLIC BLOOD PRESSURE: 80 MMHG | WEIGHT: 211.63 LBS | HEART RATE: 57 BPM | BODY MASS INDEX: 30.37 KG/M2 | RESPIRATION RATE: 18 BRPM | SYSTOLIC BLOOD PRESSURE: 133 MMHG | TEMPERATURE: 97 F

## 2020-03-16 DIAGNOSIS — C64.9 RENAL CELL CARCINOMA, UNSPECIFIED LATERALITY: Primary | ICD-10-CM

## 2020-03-16 DIAGNOSIS — C78.7 LIVER METASTASES: ICD-10-CM

## 2020-03-16 DIAGNOSIS — Z51.11 ENCOUNTER FOR ANTINEOPLASTIC CHEMOTHERAPY: ICD-10-CM

## 2020-03-16 PROCEDURE — 63600175 PHARM REV CODE 636 W HCPCS: Mod: JG,PN | Performed by: PHYSICIAN ASSISTANT

## 2020-03-16 PROCEDURE — 96413 CHEMO IV INFUSION 1 HR: CPT | Mod: PN

## 2020-03-16 RX ORDER — HEPARIN 100 UNIT/ML
500 SYRINGE INTRAVENOUS
Status: DISCONTINUED | OUTPATIENT
Start: 2020-03-16 | End: 2020-03-16 | Stop reason: HOSPADM

## 2020-03-16 RX ORDER — SODIUM CHLORIDE 0.9 % (FLUSH) 0.9 %
10 SYRINGE (ML) INJECTION
Status: DISCONTINUED | OUTPATIENT
Start: 2020-03-16 | End: 2020-03-16 | Stop reason: HOSPADM

## 2020-03-16 RX ADMIN — SODIUM CHLORIDE: 900 INJECTION, SOLUTION INTRAVENOUS at 12:03

## 2020-03-16 RX ADMIN — ATEZOLIZUMAB 1200 MG: 1200 INJECTION, SOLUTION INTRAVENOUS at 12:03

## 2020-03-16 RX ADMIN — HEPARIN SODIUM (PORCINE) LOCK FLUSH IV SOLN 100 UNIT/ML 500 UNITS: 100 SOLUTION at 01:03

## 2020-04-06 ENCOUNTER — INFUSION (OUTPATIENT)
Dept: INFUSION THERAPY | Facility: HOSPITAL | Age: 70
End: 2020-04-06
Attending: INTERNAL MEDICINE
Payer: MEDICARE

## 2020-04-06 VITALS
HEART RATE: 50 BPM | TEMPERATURE: 98 F | DIASTOLIC BLOOD PRESSURE: 80 MMHG | HEIGHT: 70 IN | WEIGHT: 212.31 LBS | RESPIRATION RATE: 18 BRPM | BODY MASS INDEX: 30.39 KG/M2 | SYSTOLIC BLOOD PRESSURE: 120 MMHG

## 2020-04-06 DIAGNOSIS — C64.9 RENAL CELL CARCINOMA, UNSPECIFIED LATERALITY: Primary | ICD-10-CM

## 2020-04-06 DIAGNOSIS — C78.7 LIVER METASTASES: ICD-10-CM

## 2020-04-06 DIAGNOSIS — Z51.11 ENCOUNTER FOR ANTINEOPLASTIC CHEMOTHERAPY: ICD-10-CM

## 2020-04-06 PROBLEM — C64.2 MALIGNANT NEOPLASM OF LEFT KIDNEY: Status: ACTIVE | Noted: 2020-04-06

## 2020-04-06 PROCEDURE — 25000003 PHARM REV CODE 250: Mod: PN | Performed by: PHYSICIAN ASSISTANT

## 2020-04-06 PROCEDURE — 96413 CHEMO IV INFUSION 1 HR: CPT | Mod: PN

## 2020-04-06 PROCEDURE — 63600175 PHARM REV CODE 636 W HCPCS: Mod: PN | Performed by: PHYSICIAN ASSISTANT

## 2020-04-06 PROCEDURE — A4216 STERILE WATER/SALINE, 10 ML: HCPCS | Mod: PN | Performed by: PHYSICIAN ASSISTANT

## 2020-04-06 RX ORDER — HEPARIN 100 UNIT/ML
500 SYRINGE INTRAVENOUS
Status: DISCONTINUED | OUTPATIENT
Start: 2020-04-06 | End: 2020-04-06 | Stop reason: HOSPADM

## 2020-04-06 RX ORDER — SODIUM CHLORIDE 0.9 % (FLUSH) 0.9 %
10 SYRINGE (ML) INJECTION
Status: DISCONTINUED | OUTPATIENT
Start: 2020-04-06 | End: 2020-04-06 | Stop reason: HOSPADM

## 2020-04-06 RX ADMIN — SODIUM CHLORIDE: 900 INJECTION, SOLUTION INTRAVENOUS at 12:04

## 2020-04-06 RX ADMIN — ATEZOLIZUMAB 1200 MG: 1200 INJECTION, SOLUTION INTRAVENOUS at 12:04

## 2020-04-06 RX ADMIN — Medication 10 ML: at 01:04

## 2020-04-06 RX ADMIN — HEPARIN 500 UNITS: 100 SYRINGE at 01:04

## 2020-04-06 NOTE — PLAN OF CARE
Problem: Adult Inpatient Plan of Care  Goal: Plan of Care Review  4/6/2020 1318 by Jes Mayberry RN  Outcome: Ongoing, Progressing  Flowsheets (Taken 4/6/2020 1318)  Plan of Care Reviewed With: patient    Pt tolerated Tecentriq infusion well.  No adverse reaction noted.  Pt education reinforced on potential side effects, what to expect, and when to call physician; we also reviewed patient's schedule and understanding verbalized.    PAD flushed with Hep and de-accessed per protocol.  Patient ambulated out of clinic in no acute distress.

## 2020-04-06 NOTE — PLAN OF CARE
Pt is admitted to infusion suite for treatment. Assessment completed and POC discussed  Verbally acknowledged understanding

## 2020-04-21 DIAGNOSIS — Z03.818 ENCNTR FOR OBS FOR SUSP EXPSR TO OTH BIOLG AGENTS RULED OUT: Primary | ICD-10-CM

## 2020-04-23 ENCOUNTER — LAB VISIT (OUTPATIENT)
Dept: INFUSION THERAPY | Facility: HOSPITAL | Age: 70
End: 2020-04-23
Attending: PHYSICIAN ASSISTANT
Payer: MEDICARE

## 2020-04-23 DIAGNOSIS — Z03.818 ENCNTR FOR OBS FOR SUSP EXPSR TO OTH BIOLG AGENTS RULED OUT: ICD-10-CM

## 2020-04-23 PROCEDURE — U0002 COVID-19 LAB TEST NON-CDC: HCPCS

## 2020-04-23 NOTE — PROGRESS NOTES
Covid screening test complete. Pt tolerated well.   Pt called stating that his blood sugars are still around the 300's and not coming down. He would like to know what he needs to do. Please advise.

## 2020-04-24 LAB — SARS-COV-2 RNA RESP QL NAA+PROBE: NOT DETECTED

## 2020-04-27 ENCOUNTER — INFUSION (OUTPATIENT)
Dept: INFUSION THERAPY | Facility: HOSPITAL | Age: 70
End: 2020-04-27
Attending: INTERNAL MEDICINE
Payer: MEDICARE

## 2020-04-27 VITALS
TEMPERATURE: 97 F | HEART RATE: 72 BPM | SYSTOLIC BLOOD PRESSURE: 146 MMHG | WEIGHT: 211.63 LBS | DIASTOLIC BLOOD PRESSURE: 77 MMHG | RESPIRATION RATE: 18 BRPM | BODY MASS INDEX: 30.37 KG/M2

## 2020-04-27 DIAGNOSIS — C64.9 RENAL CELL CARCINOMA, UNSPECIFIED LATERALITY: Primary | ICD-10-CM

## 2020-04-27 DIAGNOSIS — Z51.11 ENCOUNTER FOR ANTINEOPLASTIC CHEMOTHERAPY: ICD-10-CM

## 2020-04-27 DIAGNOSIS — C78.7 LIVER METASTASES: ICD-10-CM

## 2020-04-27 PROCEDURE — 96413 CHEMO IV INFUSION 1 HR: CPT | Mod: PN

## 2020-04-27 PROCEDURE — 63600175 PHARM REV CODE 636 W HCPCS: Mod: JG,PN | Performed by: PHYSICIAN ASSISTANT

## 2020-04-27 PROCEDURE — 25000003 PHARM REV CODE 250: Mod: PN | Performed by: PHYSICIAN ASSISTANT

## 2020-04-27 RX ORDER — SODIUM CHLORIDE 0.9 % (FLUSH) 0.9 %
10 SYRINGE (ML) INJECTION
Status: DISCONTINUED | OUTPATIENT
Start: 2020-04-27 | End: 2020-04-27 | Stop reason: HOSPADM

## 2020-04-27 RX ADMIN — ATEZOLIZUMAB 1200 MG: 1200 INJECTION, SOLUTION INTRAVENOUS at 11:04

## 2020-04-27 RX ADMIN — SODIUM CHLORIDE: 900 INJECTION, SOLUTION INTRAVENOUS at 11:04

## 2020-05-14 ENCOUNTER — LAB VISIT (OUTPATIENT)
Dept: INFUSION THERAPY | Facility: HOSPITAL | Age: 70
End: 2020-05-14
Attending: INTERNAL MEDICINE
Payer: MEDICARE

## 2020-05-14 DIAGNOSIS — Z03.818 ENCNTR FOR OBS FOR SUSP EXPSR TO OTH BIOLG AGENTS RULED OUT: ICD-10-CM

## 2020-05-14 PROCEDURE — U0002 COVID-19 LAB TEST NON-CDC: HCPCS

## 2020-05-15 LAB — SARS-COV-2 RNA RESP QL NAA+PROBE: NOT DETECTED

## 2020-05-18 ENCOUNTER — INFUSION (OUTPATIENT)
Dept: INFUSION THERAPY | Facility: HOSPITAL | Age: 70
End: 2020-05-18
Attending: INTERNAL MEDICINE
Payer: MEDICARE

## 2020-05-18 VITALS
RESPIRATION RATE: 18 BRPM | BODY MASS INDEX: 30.96 KG/M2 | DIASTOLIC BLOOD PRESSURE: 78 MMHG | SYSTOLIC BLOOD PRESSURE: 131 MMHG | WEIGHT: 216.25 LBS | HEART RATE: 66 BPM | HEIGHT: 70 IN

## 2020-05-18 DIAGNOSIS — C64.9 RENAL CELL CARCINOMA, UNSPECIFIED LATERALITY: Primary | ICD-10-CM

## 2020-05-18 DIAGNOSIS — Z51.11 ENCOUNTER FOR ANTINEOPLASTIC CHEMOTHERAPY: ICD-10-CM

## 2020-05-18 DIAGNOSIS — C78.7 LIVER METASTASES: ICD-10-CM

## 2020-05-18 PROBLEM — E03.9 HYPOTHYROIDISM: Status: ACTIVE | Noted: 2020-05-18

## 2020-05-18 PROCEDURE — A4216 STERILE WATER/SALINE, 10 ML: HCPCS | Mod: PN | Performed by: PHYSICIAN ASSISTANT

## 2020-05-18 PROCEDURE — 96413 CHEMO IV INFUSION 1 HR: CPT | Mod: PN

## 2020-05-18 PROCEDURE — 25000003 PHARM REV CODE 250: Mod: PN | Performed by: PHYSICIAN ASSISTANT

## 2020-05-18 PROCEDURE — 63600175 PHARM REV CODE 636 W HCPCS: Mod: JG,PN | Performed by: PHYSICIAN ASSISTANT

## 2020-05-18 RX ORDER — SODIUM CHLORIDE 0.9 % (FLUSH) 0.9 %
10 SYRINGE (ML) INJECTION
Status: DISCONTINUED | OUTPATIENT
Start: 2020-05-18 | End: 2020-05-18 | Stop reason: HOSPADM

## 2020-05-18 RX ORDER — HEPARIN 100 UNIT/ML
500 SYRINGE INTRAVENOUS
Status: DISCONTINUED | OUTPATIENT
Start: 2020-05-18 | End: 2020-05-18 | Stop reason: HOSPADM

## 2020-05-18 RX ADMIN — ATEZOLIZUMAB 1200 MG: 1200 INJECTION, SOLUTION INTRAVENOUS at 11:05

## 2020-05-18 RX ADMIN — SODIUM CHLORIDE: 9 INJECTION, SOLUTION INTRAVENOUS at 11:05

## 2020-05-18 RX ADMIN — HEPARIN 500 UNITS: 100 SYRINGE at 12:05

## 2020-05-18 RX ADMIN — Medication 10 ML: at 12:05

## 2020-06-04 ENCOUNTER — LAB VISIT (OUTPATIENT)
Dept: LAB | Facility: HOSPITAL | Age: 70
End: 2020-06-04
Attending: INTERNAL MEDICINE
Payer: MEDICARE

## 2020-06-04 DIAGNOSIS — C64.9 RENAL CELL CARCINOMA OF KIDNEY EXCLUDING RENAL PELVIS: ICD-10-CM

## 2020-06-04 LAB
ALBUMIN SERPL BCP-MCNC: 4.2 G/DL (ref 3.5–5.2)
ALP SERPL-CCNC: 102 U/L (ref 38–145)
ALT SERPL W/O P-5'-P-CCNC: 25 U/L (ref 0–50)
ANION GAP SERPL CALC-SCNC: 7 MMOL/L (ref 8–16)
AST SERPL-CCNC: 39 U/L (ref 17–59)
BASOPHILS # BLD AUTO: 0.02 K/UL (ref 0–0.2)
BASOPHILS NFR BLD: 0.3 % (ref 0–1.9)
BILIRUB SERPL-MCNC: 0.7 MG/DL (ref 0.2–1.3)
BUN SERPL-MCNC: 27 MG/DL (ref 9–21)
CALCIUM SERPL-MCNC: 9.5 MG/DL (ref 8.4–10.2)
CHLORIDE SERPL-SCNC: 106 MMOL/L (ref 95–110)
CO2 SERPL-SCNC: 26 MMOL/L (ref 22–31)
CREAT SERPL-MCNC: 1.64 MG/DL (ref 0.5–1.4)
DIFFERENTIAL METHOD: ABNORMAL
EOSINOPHIL # BLD AUTO: 0.1 K/UL (ref 0–0.5)
EOSINOPHIL NFR BLD: 1 % (ref 0–8)
ERYTHROCYTE [DISTWIDTH] IN BLOOD BY AUTOMATED COUNT: 13.4 % (ref 11.5–14.5)
EST. GFR  (AFRICAN AMERICAN): 49 ML/MIN/1.73 M^2
EST. GFR  (NON AFRICAN AMERICAN): 42 ML/MIN/1.73 M^2
GLUCOSE SERPL-MCNC: 88 MG/DL (ref 70–110)
HCT VFR BLD AUTO: 45.3 % (ref 40–54)
HGB BLD-MCNC: 14.6 G/DL (ref 14–18)
IMM GRANULOCYTES # BLD AUTO: 0.01 K/UL (ref 0–0.04)
IMM GRANULOCYTES NFR BLD AUTO: 0.2 % (ref 0–0.5)
LYMPHOCYTES # BLD AUTO: 1.8 K/UL (ref 1–4.8)
LYMPHOCYTES NFR BLD: 28.7 % (ref 18–48)
MCH RBC QN AUTO: 30.9 PG (ref 27–31)
MCHC RBC AUTO-ENTMCNC: 32.2 G/DL (ref 32–36)
MCV RBC AUTO: 96 FL (ref 82–98)
MONOCYTES # BLD AUTO: 0.7 K/UL (ref 0.3–1)
MONOCYTES NFR BLD: 11 % (ref 4–15)
NEUTROPHILS # BLD AUTO: 3.7 K/UL (ref 1.8–7.7)
NEUTROPHILS NFR BLD: 58.8 % (ref 38–73)
NRBC BLD-RTO: 0 /100 WBC
PLATELET # BLD AUTO: 122 K/UL (ref 150–350)
PMV BLD AUTO: 10.1 FL (ref 9.2–12.9)
POTASSIUM SERPL-SCNC: 4.8 MMOL/L (ref 3.5–5.1)
PROT SERPL-MCNC: 7.4 G/DL (ref 6–8.4)
RBC # BLD AUTO: 4.72 M/UL (ref 4.6–6.2)
SODIUM SERPL-SCNC: 139 MMOL/L (ref 136–145)
WBC # BLD AUTO: 6.28 K/UL (ref 3.9–12.7)

## 2020-06-04 PROCEDURE — 85025 COMPLETE CBC W/AUTO DIFF WBC: CPT | Mod: PN

## 2020-06-04 PROCEDURE — 80053 COMPREHEN METABOLIC PANEL: CPT | Mod: PN

## 2020-06-04 PROCEDURE — 80053 COMPREHEN METABOLIC PANEL: CPT

## 2020-06-04 PROCEDURE — 36415 COLL VENOUS BLD VENIPUNCTURE: CPT | Mod: PN

## 2020-06-04 PROCEDURE — 85025 COMPLETE CBC W/AUTO DIFF WBC: CPT

## 2020-06-08 ENCOUNTER — INFUSION (OUTPATIENT)
Dept: INFUSION THERAPY | Facility: HOSPITAL | Age: 70
End: 2020-06-08
Attending: PHYSICIAN ASSISTANT
Payer: MEDICARE

## 2020-06-08 VITALS
HEART RATE: 70 BPM | DIASTOLIC BLOOD PRESSURE: 90 MMHG | SYSTOLIC BLOOD PRESSURE: 150 MMHG | TEMPERATURE: 98 F | RESPIRATION RATE: 18 BRPM | HEIGHT: 70 IN | BODY MASS INDEX: 30.35 KG/M2 | WEIGHT: 212 LBS

## 2020-06-08 DIAGNOSIS — C64.9 RENAL CELL CARCINOMA, UNSPECIFIED LATERALITY: Primary | ICD-10-CM

## 2020-06-08 DIAGNOSIS — Z51.11 ENCOUNTER FOR ANTINEOPLASTIC CHEMOTHERAPY: ICD-10-CM

## 2020-06-08 DIAGNOSIS — C78.7 LIVER METASTASES: ICD-10-CM

## 2020-06-08 PROCEDURE — 25000003 PHARM REV CODE 250: Mod: PN | Performed by: PHYSICIAN ASSISTANT

## 2020-06-08 PROCEDURE — 96413 CHEMO IV INFUSION 1 HR: CPT | Mod: PN

## 2020-06-08 PROCEDURE — 63600175 PHARM REV CODE 636 W HCPCS: Mod: JG,PN | Performed by: PHYSICIAN ASSISTANT

## 2020-06-08 RX ORDER — SODIUM CHLORIDE 0.9 % (FLUSH) 0.9 %
10 SYRINGE (ML) INJECTION
Status: DISCONTINUED | OUTPATIENT
Start: 2020-06-08 | End: 2020-06-08 | Stop reason: HOSPADM

## 2020-06-08 RX ORDER — HEPARIN 100 UNIT/ML
500 SYRINGE INTRAVENOUS
Status: DISCONTINUED | OUTPATIENT
Start: 2020-06-08 | End: 2020-06-08 | Stop reason: HOSPADM

## 2020-06-08 RX ADMIN — SODIUM CHLORIDE: 9 INJECTION, SOLUTION INTRAVENOUS at 12:06

## 2020-06-08 RX ADMIN — ATEZOLIZUMAB 1200 MG: 1200 INJECTION, SOLUTION INTRAVENOUS at 12:06

## 2020-06-25 ENCOUNTER — LAB VISIT (OUTPATIENT)
Dept: LAB | Facility: HOSPITAL | Age: 70
End: 2020-06-25
Attending: NURSE PRACTITIONER
Payer: MEDICARE

## 2020-06-25 DIAGNOSIS — C64.9 RENAL CELL CARCINOMA OF KIDNEY EXCLUDING RENAL PELVIS: ICD-10-CM

## 2020-06-25 DIAGNOSIS — C64.9 RENAL CELL CARCINOMA, UNSPECIFIED LATERALITY: ICD-10-CM

## 2020-06-25 DIAGNOSIS — E03.9 HYPOTHYROIDISM, UNSPECIFIED TYPE: ICD-10-CM

## 2020-06-25 LAB
ALBUMIN SERPL BCP-MCNC: 4.3 G/DL (ref 3.5–5.2)
ALP SERPL-CCNC: 116 U/L (ref 38–145)
ALT SERPL W/O P-5'-P-CCNC: 26 U/L (ref 0–50)
ANION GAP SERPL CALC-SCNC: 8 MMOL/L (ref 8–16)
AST SERPL-CCNC: 37 U/L (ref 17–59)
BASOPHILS # BLD AUTO: 0.02 K/UL (ref 0–0.2)
BASOPHILS NFR BLD: 0.3 % (ref 0–1.9)
BILIRUB SERPL-MCNC: 0.6 MG/DL (ref 0.2–1.3)
BUN SERPL-MCNC: 26 MG/DL (ref 9–21)
CALCIUM SERPL-MCNC: 9.5 MG/DL (ref 8.4–10.2)
CHLORIDE SERPL-SCNC: 104 MMOL/L (ref 95–110)
CO2 SERPL-SCNC: 27 MMOL/L (ref 22–31)
CREAT SERPL-MCNC: 1.71 MG/DL (ref 0.5–1.4)
DIFFERENTIAL METHOD: ABNORMAL
EOSINOPHIL # BLD AUTO: 0 K/UL (ref 0–0.5)
EOSINOPHIL NFR BLD: 0.6 % (ref 0–8)
ERYTHROCYTE [DISTWIDTH] IN BLOOD BY AUTOMATED COUNT: 13 % (ref 11.5–14.5)
EST. GFR  (AFRICAN AMERICAN): 46 ML/MIN/1.73 M^2
EST. GFR  (NON AFRICAN AMERICAN): 40 ML/MIN/1.73 M^2
GLUCOSE SERPL-MCNC: 100 MG/DL (ref 70–110)
HCT VFR BLD AUTO: 45.8 % (ref 40–54)
HGB BLD-MCNC: 15 G/DL (ref 14–18)
IMM GRANULOCYTES # BLD AUTO: 0.02 K/UL (ref 0–0.04)
IMM GRANULOCYTES NFR BLD AUTO: 0.3 % (ref 0–0.5)
LYMPHOCYTES # BLD AUTO: 2.1 K/UL (ref 1–4.8)
LYMPHOCYTES NFR BLD: 29.7 % (ref 18–48)
MCH RBC QN AUTO: 31.2 PG (ref 27–31)
MCHC RBC AUTO-ENTMCNC: 32.8 G/DL (ref 32–36)
MCV RBC AUTO: 95 FL (ref 82–98)
MONOCYTES # BLD AUTO: 0.8 K/UL (ref 0.3–1)
MONOCYTES NFR BLD: 10.5 % (ref 4–15)
NEUTROPHILS # BLD AUTO: 4.2 K/UL (ref 1.8–7.7)
NEUTROPHILS NFR BLD: 58.6 % (ref 38–73)
NRBC BLD-RTO: 0 /100 WBC
PLATELET # BLD AUTO: 128 K/UL (ref 150–350)
PMV BLD AUTO: 10.1 FL (ref 9.2–12.9)
POTASSIUM SERPL-SCNC: 5 MMOL/L (ref 3.5–5.1)
PROT SERPL-MCNC: 7.3 G/DL (ref 6–8.4)
RBC # BLD AUTO: 4.81 M/UL (ref 4.6–6.2)
SODIUM SERPL-SCNC: 139 MMOL/L (ref 136–145)
T4 FREE SP9 P CHAL SERPL-SCNC: 1.77 NG/DL (ref 0.78–2.19)
TSH SERPL DL<=0.005 MIU/L-ACNC: 0.68 UIU/ML (ref 0.4–4)
WBC # BLD AUTO: 7.15 K/UL (ref 3.9–12.7)

## 2020-06-25 PROCEDURE — 85025 COMPLETE CBC W/AUTO DIFF WBC: CPT

## 2020-06-25 PROCEDURE — 84439 ASSAY OF FREE THYROXINE: CPT | Mod: PN

## 2020-06-25 PROCEDURE — 80053 COMPREHEN METABOLIC PANEL: CPT

## 2020-06-25 PROCEDURE — 36415 COLL VENOUS BLD VENIPUNCTURE: CPT | Mod: PN

## 2020-06-25 PROCEDURE — 84439 ASSAY OF FREE THYROXINE: CPT

## 2020-06-25 PROCEDURE — 84443 ASSAY THYROID STIM HORMONE: CPT

## 2020-06-25 PROCEDURE — 85025 COMPLETE CBC W/AUTO DIFF WBC: CPT | Mod: PN

## 2020-06-25 PROCEDURE — 80053 COMPREHEN METABOLIC PANEL: CPT | Mod: PN

## 2020-06-25 PROCEDURE — 84443 ASSAY THYROID STIM HORMONE: CPT | Mod: PN

## 2020-06-29 ENCOUNTER — INFUSION (OUTPATIENT)
Dept: INFUSION THERAPY | Facility: HOSPITAL | Age: 70
End: 2020-06-29
Attending: PHYSICIAN ASSISTANT
Payer: MEDICARE

## 2020-06-29 VITALS
HEIGHT: 70 IN | BODY MASS INDEX: 30.68 KG/M2 | SYSTOLIC BLOOD PRESSURE: 117 MMHG | RESPIRATION RATE: 18 BRPM | DIASTOLIC BLOOD PRESSURE: 81 MMHG | WEIGHT: 214.31 LBS | TEMPERATURE: 98 F | HEART RATE: 68 BPM

## 2020-06-29 DIAGNOSIS — C78.7 LIVER METASTASES: ICD-10-CM

## 2020-06-29 DIAGNOSIS — Z51.11 ENCOUNTER FOR ANTINEOPLASTIC CHEMOTHERAPY: ICD-10-CM

## 2020-06-29 DIAGNOSIS — C64.9 RENAL CELL CARCINOMA, UNSPECIFIED LATERALITY: Primary | ICD-10-CM

## 2020-06-29 PROCEDURE — 96413 CHEMO IV INFUSION 1 HR: CPT | Mod: PN

## 2020-06-29 PROCEDURE — 25000003 PHARM REV CODE 250: Mod: PN | Performed by: PHYSICIAN ASSISTANT

## 2020-06-29 PROCEDURE — 63600175 PHARM REV CODE 636 W HCPCS: Mod: JG,PN | Performed by: PHYSICIAN ASSISTANT

## 2020-06-29 RX ORDER — HEPARIN 100 UNIT/ML
500 SYRINGE INTRAVENOUS
Status: DISCONTINUED | OUTPATIENT
Start: 2020-06-29 | End: 2020-06-29 | Stop reason: HOSPADM

## 2020-06-29 RX ORDER — SODIUM CHLORIDE 0.9 % (FLUSH) 0.9 %
10 SYRINGE (ML) INJECTION
Status: DISCONTINUED | OUTPATIENT
Start: 2020-06-29 | End: 2020-06-29 | Stop reason: HOSPADM

## 2020-06-29 RX ADMIN — HEPARIN SODIUM (PORCINE) LOCK FLUSH IV SOLN 100 UNIT/ML 500 UNITS: 100 SOLUTION at 12:06

## 2020-06-29 RX ADMIN — SODIUM CHLORIDE: 9 INJECTION, SOLUTION INTRAVENOUS at 11:06

## 2020-06-29 RX ADMIN — ATEZOLIZUMAB 1200 MG: 1200 INJECTION, SOLUTION INTRAVENOUS at 11:06

## 2020-07-20 ENCOUNTER — INFUSION (OUTPATIENT)
Dept: INFUSION THERAPY | Facility: HOSPITAL | Age: 70
End: 2020-07-20
Attending: PHYSICIAN ASSISTANT
Payer: MEDICARE

## 2020-07-20 VITALS
DIASTOLIC BLOOD PRESSURE: 87 MMHG | SYSTOLIC BLOOD PRESSURE: 139 MMHG | RESPIRATION RATE: 18 BRPM | TEMPERATURE: 98 F | HEIGHT: 70 IN | BODY MASS INDEX: 30.06 KG/M2 | WEIGHT: 210 LBS | HEART RATE: 48 BPM

## 2020-07-20 DIAGNOSIS — Z51.11 ENCOUNTER FOR ANTINEOPLASTIC CHEMOTHERAPY: ICD-10-CM

## 2020-07-20 DIAGNOSIS — C78.7 LIVER METASTASES: ICD-10-CM

## 2020-07-20 DIAGNOSIS — C64.9 RENAL CELL CARCINOMA, UNSPECIFIED LATERALITY: Primary | ICD-10-CM

## 2020-07-20 PROCEDURE — 96413 CHEMO IV INFUSION 1 HR: CPT | Mod: PN

## 2020-07-20 PROCEDURE — 63600175 PHARM REV CODE 636 W HCPCS: Mod: JG,PN | Performed by: PHYSICIAN ASSISTANT

## 2020-07-20 PROCEDURE — 25000003 PHARM REV CODE 250: Mod: PN | Performed by: PHYSICIAN ASSISTANT

## 2020-07-20 RX ORDER — HEPARIN 100 UNIT/ML
500 SYRINGE INTRAVENOUS
Status: DISCONTINUED | OUTPATIENT
Start: 2020-07-20 | End: 2020-07-20 | Stop reason: HOSPADM

## 2020-07-20 RX ORDER — SODIUM CHLORIDE 0.9 % (FLUSH) 0.9 %
10 SYRINGE (ML) INJECTION
Status: DISCONTINUED | OUTPATIENT
Start: 2020-07-20 | End: 2020-07-20 | Stop reason: HOSPADM

## 2020-07-20 RX ADMIN — SODIUM CHLORIDE: 900 INJECTION, SOLUTION INTRAVENOUS at 10:07

## 2020-07-20 RX ADMIN — HEPARIN SODIUM (PORCINE) LOCK FLUSH IV SOLN 100 UNIT/ML 500 UNITS: 100 SOLUTION at 11:07

## 2020-07-20 RX ADMIN — ATEZOLIZUMAB 1200 MG: 1200 INJECTION, SOLUTION INTRAVENOUS at 10:07

## 2020-08-10 ENCOUNTER — INFUSION (OUTPATIENT)
Dept: INFUSION THERAPY | Facility: HOSPITAL | Age: 70
End: 2020-08-10
Attending: NURSE PRACTITIONER
Payer: MEDICARE

## 2020-08-10 VITALS
TEMPERATURE: 98 F | HEART RATE: 50 BPM | SYSTOLIC BLOOD PRESSURE: 142 MMHG | DIASTOLIC BLOOD PRESSURE: 87 MMHG | RESPIRATION RATE: 18 BRPM

## 2020-08-10 DIAGNOSIS — C78.7 LIVER METASTASES: ICD-10-CM

## 2020-08-10 DIAGNOSIS — Z51.11 ENCOUNTER FOR ANTINEOPLASTIC CHEMOTHERAPY: ICD-10-CM

## 2020-08-10 DIAGNOSIS — C64.9 RENAL CELL CARCINOMA, UNSPECIFIED LATERALITY: Primary | ICD-10-CM

## 2020-08-10 PROCEDURE — 63600175 PHARM REV CODE 636 W HCPCS: Mod: PN | Performed by: PHYSICIAN ASSISTANT

## 2020-08-10 PROCEDURE — 96413 CHEMO IV INFUSION 1 HR: CPT | Mod: PN

## 2020-08-10 PROCEDURE — 25000003 PHARM REV CODE 250: Mod: PN | Performed by: PHYSICIAN ASSISTANT

## 2020-08-10 RX ORDER — SODIUM CHLORIDE 0.9 % (FLUSH) 0.9 %
10 SYRINGE (ML) INJECTION
Status: DISCONTINUED | OUTPATIENT
Start: 2020-08-10 | End: 2020-08-10 | Stop reason: HOSPADM

## 2020-08-10 RX ORDER — HEPARIN 100 UNIT/ML
500 SYRINGE INTRAVENOUS
Status: DISCONTINUED | OUTPATIENT
Start: 2020-08-10 | End: 2020-08-10 | Stop reason: HOSPADM

## 2020-08-10 RX ADMIN — HEPARIN SODIUM (PORCINE) LOCK FLUSH IV SOLN 100 UNIT/ML 500 UNITS: 100 SOLUTION at 12:08

## 2020-08-10 RX ADMIN — ATEZOLIZUMAB 1200 MG: 1200 INJECTION, SOLUTION INTRAVENOUS at 11:08

## 2020-08-10 RX ADMIN — SODIUM CHLORIDE: 900 INJECTION, SOLUTION INTRAVENOUS at 11:08

## 2020-08-11 ENCOUNTER — DOCUMENTATION ONLY (OUTPATIENT)
Dept: INFUSION THERAPY | Facility: HOSPITAL | Age: 70
End: 2020-08-11

## 2020-08-11 NOTE — PROGRESS NOTES
Nutrition: Called pt on the phone today to follow up. Pt states he is doing great nutritionally and has a great appetite. Encouraged him to continue and to drink hydrating liquids. Will follow up with pt next week to check in.     Niki Ross MS, RD, LDN

## 2020-08-31 ENCOUNTER — INFUSION (OUTPATIENT)
Dept: INFUSION THERAPY | Facility: HOSPITAL | Age: 70
End: 2020-08-31
Attending: NURSE PRACTITIONER
Payer: MEDICARE

## 2020-08-31 VITALS
HEART RATE: 66 BPM | SYSTOLIC BLOOD PRESSURE: 136 MMHG | WEIGHT: 210 LBS | TEMPERATURE: 98 F | HEIGHT: 70 IN | RESPIRATION RATE: 18 BRPM | BODY MASS INDEX: 30.06 KG/M2 | DIASTOLIC BLOOD PRESSURE: 71 MMHG

## 2020-08-31 DIAGNOSIS — C78.7 LIVER METASTASES: ICD-10-CM

## 2020-08-31 DIAGNOSIS — C64.9 RENAL CELL CARCINOMA, UNSPECIFIED LATERALITY: Primary | ICD-10-CM

## 2020-08-31 DIAGNOSIS — Z51.11 ENCOUNTER FOR ANTINEOPLASTIC CHEMOTHERAPY: ICD-10-CM

## 2020-08-31 PROCEDURE — 63600175 PHARM REV CODE 636 W HCPCS: Mod: JG,PN | Performed by: PHYSICIAN ASSISTANT

## 2020-08-31 PROCEDURE — 96413 CHEMO IV INFUSION 1 HR: CPT | Mod: PN

## 2020-08-31 PROCEDURE — 25000003 PHARM REV CODE 250: Mod: PN | Performed by: PHYSICIAN ASSISTANT

## 2020-08-31 RX ORDER — HEPARIN 100 UNIT/ML
500 SYRINGE INTRAVENOUS
Status: DISCONTINUED | OUTPATIENT
Start: 2020-08-31 | End: 2020-08-31 | Stop reason: HOSPADM

## 2020-08-31 RX ORDER — SODIUM CHLORIDE 0.9 % (FLUSH) 0.9 %
10 SYRINGE (ML) INJECTION
Status: DISCONTINUED | OUTPATIENT
Start: 2020-08-31 | End: 2020-08-31 | Stop reason: HOSPADM

## 2020-08-31 RX ADMIN — ATEZOLIZUMAB 1200 MG: 1200 INJECTION, SOLUTION INTRAVENOUS at 11:08

## 2020-08-31 RX ADMIN — HEPARIN SODIUM (PORCINE) LOCK FLUSH IV SOLN 100 UNIT/ML 500 UNITS: 100 SOLUTION at 11:08

## 2020-08-31 RX ADMIN — SODIUM CHLORIDE: 900 INJECTION, SOLUTION INTRAVENOUS at 11:08

## 2020-08-31 NOTE — PROGRESS NOTES
Oncology Nutrition   Chemotherapy Infusion Visit  Bud Russo III   1950    Nutrition Education   This is a 69 y.o.male with a medical diagnosis of liver mets, renal cell carcinoma.   Met w/ pt today to check on nutritional status. Pt w/ great appetite, consuming 3 meals a day, snacks as needed. Weight remains stable. Denies any GI distress. Encouraged support. RD to f/u.    FRANSICO JIMENEZ MA, RD, LDN  08/31/2020  1:36 PM

## 2020-09-01 PROBLEM — H61.892: Status: ACTIVE | Noted: 2020-09-01

## 2020-09-18 ENCOUNTER — DOCUMENTATION ONLY (OUTPATIENT)
Dept: INFUSION THERAPY | Facility: HOSPITAL | Age: 70
End: 2020-09-18

## 2020-09-18 NOTE — PROGRESS NOTES
Nutrition: Called pt on the phone today and pt reported no nutrition concerns. Pt states good appetite, stable weight, and no N/V or D/C. Encourged pt to continue to eat to keep weight stable and to hydrate when he can. Will follow up with him during his next visit to the cancer center next week.     Niki Ross MS, RD, LDN

## 2020-09-21 ENCOUNTER — DOCUMENTATION ONLY (OUTPATIENT)
Dept: INFUSION THERAPY | Facility: HOSPITAL | Age: 70
End: 2020-09-21

## 2020-09-21 ENCOUNTER — INFUSION (OUTPATIENT)
Dept: INFUSION THERAPY | Facility: HOSPITAL | Age: 70
End: 2020-09-21
Attending: NURSE PRACTITIONER
Payer: MEDICARE

## 2020-09-21 VITALS
DIASTOLIC BLOOD PRESSURE: 86 MMHG | TEMPERATURE: 97 F | SYSTOLIC BLOOD PRESSURE: 132 MMHG | HEIGHT: 70 IN | BODY MASS INDEX: 30.42 KG/M2 | HEART RATE: 79 BPM | WEIGHT: 212.5 LBS | RESPIRATION RATE: 18 BRPM

## 2020-09-21 DIAGNOSIS — C78.7 LIVER METASTASES: ICD-10-CM

## 2020-09-21 DIAGNOSIS — Z51.11 ENCOUNTER FOR ANTINEOPLASTIC CHEMOTHERAPY: ICD-10-CM

## 2020-09-21 DIAGNOSIS — C64.9 RENAL CELL CARCINOMA, UNSPECIFIED LATERALITY: Primary | ICD-10-CM

## 2020-09-21 PROCEDURE — 25000003 PHARM REV CODE 250: Mod: PN | Performed by: PHYSICIAN ASSISTANT

## 2020-09-21 PROCEDURE — A4216 STERILE WATER/SALINE, 10 ML: HCPCS | Mod: PN | Performed by: PHYSICIAN ASSISTANT

## 2020-09-21 PROCEDURE — 96413 CHEMO IV INFUSION 1 HR: CPT | Mod: PN

## 2020-09-21 PROCEDURE — 63600175 PHARM REV CODE 636 W HCPCS: Mod: JG,PN | Performed by: PHYSICIAN ASSISTANT

## 2020-09-21 RX ORDER — SODIUM CHLORIDE 0.9 % (FLUSH) 0.9 %
10 SYRINGE (ML) INJECTION
Status: DISCONTINUED | OUTPATIENT
Start: 2020-09-21 | End: 2020-09-21 | Stop reason: HOSPADM

## 2020-09-21 RX ORDER — HEPARIN 100 UNIT/ML
500 SYRINGE INTRAVENOUS
Status: DISCONTINUED | OUTPATIENT
Start: 2020-09-21 | End: 2020-09-21 | Stop reason: HOSPADM

## 2020-09-21 RX ADMIN — HEPARIN 500 UNITS: 100 SYRINGE at 12:09

## 2020-09-21 RX ADMIN — Medication 10 ML: at 11:09

## 2020-09-21 RX ADMIN — SODIUM CHLORIDE: 9 INJECTION, SOLUTION INTRAVENOUS at 11:09

## 2020-09-21 RX ADMIN — ATEZOLIZUMAB 1200 MG: 1200 INJECTION, SOLUTION INTRAVENOUS at 11:09

## 2020-09-21 NOTE — PROGRESS NOTES
Nutrition: Met with pt today and he does not have any nutrition concerns. Weight is stable, he is staying hydrated, and doesn't have any nausea / vomiting or diarrhea / constipation. Encouraged pt to contact me with any issues or concerns regarding food or nutrition. Will follow up with pt at net infusion.    Niki Ross MS, RD, LDN

## 2020-10-12 ENCOUNTER — INFUSION (OUTPATIENT)
Dept: INFUSION THERAPY | Facility: HOSPITAL | Age: 70
End: 2020-10-12
Attending: NURSE PRACTITIONER
Payer: MEDICARE

## 2020-10-12 VITALS
BODY MASS INDEX: 30.71 KG/M2 | HEART RATE: 66 BPM | RESPIRATION RATE: 18 BRPM | TEMPERATURE: 97 F | DIASTOLIC BLOOD PRESSURE: 71 MMHG | SYSTOLIC BLOOD PRESSURE: 136 MMHG | WEIGHT: 214 LBS

## 2020-10-12 DIAGNOSIS — Z51.11 ENCOUNTER FOR ANTINEOPLASTIC CHEMOTHERAPY: ICD-10-CM

## 2020-10-12 DIAGNOSIS — C64.9 RENAL CELL CARCINOMA, UNSPECIFIED LATERALITY: Primary | ICD-10-CM

## 2020-10-12 DIAGNOSIS — C78.7 LIVER METASTASES: ICD-10-CM

## 2020-10-12 PROCEDURE — 96413 CHEMO IV INFUSION 1 HR: CPT | Mod: PN

## 2020-10-12 PROCEDURE — 63600175 PHARM REV CODE 636 W HCPCS: Mod: PN | Performed by: NURSE PRACTITIONER

## 2020-10-12 PROCEDURE — 25000003 PHARM REV CODE 250: Mod: PN | Performed by: NURSE PRACTITIONER

## 2020-10-12 RX ORDER — HEPARIN 100 UNIT/ML
500 SYRINGE INTRAVENOUS
Status: DISCONTINUED | OUTPATIENT
Start: 2020-10-12 | End: 2020-10-12 | Stop reason: HOSPADM

## 2020-10-12 RX ORDER — SODIUM CHLORIDE 0.9 % (FLUSH) 0.9 %
10 SYRINGE (ML) INJECTION
Status: DISCONTINUED | OUTPATIENT
Start: 2020-10-12 | End: 2020-10-12 | Stop reason: HOSPADM

## 2020-10-12 RX ADMIN — HEPARIN SODIUM (PORCINE) LOCK FLUSH IV SOLN 100 UNIT/ML 500 UNITS: 100 SOLUTION at 12:10

## 2020-10-12 RX ADMIN — ATEZOLIZUMAB 1200 MG: 1200 INJECTION, SOLUTION INTRAVENOUS at 12:10

## 2020-10-12 RX ADMIN — SODIUM CHLORIDE: 900 INJECTION, SOLUTION INTRAVENOUS at 11:10

## 2020-11-02 ENCOUNTER — INFUSION (OUTPATIENT)
Dept: INFUSION THERAPY | Facility: HOSPITAL | Age: 70
End: 2020-11-02
Attending: NURSE PRACTITIONER
Payer: MEDICARE

## 2020-11-02 VITALS
TEMPERATURE: 98 F | RESPIRATION RATE: 18 BRPM | HEART RATE: 61 BPM | HEIGHT: 70 IN | BODY MASS INDEX: 31.09 KG/M2 | DIASTOLIC BLOOD PRESSURE: 80 MMHG | SYSTOLIC BLOOD PRESSURE: 144 MMHG | WEIGHT: 217.13 LBS

## 2020-11-02 DIAGNOSIS — C64.9 RENAL CELL CARCINOMA, UNSPECIFIED LATERALITY: Primary | ICD-10-CM

## 2020-11-02 DIAGNOSIS — Z51.11 ENCOUNTER FOR ANTINEOPLASTIC CHEMOTHERAPY: ICD-10-CM

## 2020-11-02 DIAGNOSIS — C78.7 LIVER METASTASES: ICD-10-CM

## 2020-11-02 PROCEDURE — 96413 CHEMO IV INFUSION 1 HR: CPT | Mod: PN

## 2020-11-02 PROCEDURE — 63600175 PHARM REV CODE 636 W HCPCS: Mod: JG,PN | Performed by: PHYSICIAN ASSISTANT

## 2020-11-02 PROCEDURE — A4216 STERILE WATER/SALINE, 10 ML: HCPCS | Mod: PN | Performed by: PHYSICIAN ASSISTANT

## 2020-11-02 PROCEDURE — 25000003 PHARM REV CODE 250: Mod: PN | Performed by: PHYSICIAN ASSISTANT

## 2020-11-02 RX ORDER — SODIUM CHLORIDE 0.9 % (FLUSH) 0.9 %
10 SYRINGE (ML) INJECTION
Status: DISCONTINUED | OUTPATIENT
Start: 2020-11-02 | End: 2020-11-02 | Stop reason: HOSPADM

## 2020-11-02 RX ORDER — HEPARIN 100 UNIT/ML
500 SYRINGE INTRAVENOUS
Status: DISCONTINUED | OUTPATIENT
Start: 2020-11-02 | End: 2020-11-02 | Stop reason: HOSPADM

## 2020-11-02 RX ADMIN — SODIUM CHLORIDE, PRESERVATIVE FREE 500 UNITS: 5 INJECTION INTRAVENOUS at 12:11

## 2020-11-02 RX ADMIN — Medication 10 ML: at 12:11

## 2020-11-02 RX ADMIN — ATEZOLIZUMAB 1200 MG: 1200 INJECTION, SOLUTION INTRAVENOUS at 11:11

## 2020-11-02 RX ADMIN — SODIUM CHLORIDE: 9 INJECTION, SOLUTION INTRAVENOUS at 11:11

## 2020-11-23 ENCOUNTER — INFUSION (OUTPATIENT)
Dept: INFUSION THERAPY | Facility: HOSPITAL | Age: 70
End: 2020-11-23
Attending: NURSE PRACTITIONER
Payer: MEDICARE

## 2020-11-23 VITALS
TEMPERATURE: 98 F | WEIGHT: 213.88 LBS | RESPIRATION RATE: 18 BRPM | SYSTOLIC BLOOD PRESSURE: 132 MMHG | HEART RATE: 62 BPM | BODY MASS INDEX: 30.62 KG/M2 | DIASTOLIC BLOOD PRESSURE: 74 MMHG | HEIGHT: 70 IN

## 2020-11-23 DIAGNOSIS — C78.7 LIVER METASTASES: ICD-10-CM

## 2020-11-23 DIAGNOSIS — C64.9 RENAL CELL CARCINOMA, UNSPECIFIED LATERALITY: Primary | ICD-10-CM

## 2020-11-23 DIAGNOSIS — Z51.11 ENCOUNTER FOR ANTINEOPLASTIC CHEMOTHERAPY: ICD-10-CM

## 2020-11-23 PROCEDURE — 63600175 PHARM REV CODE 636 W HCPCS: Mod: JG,PN | Performed by: PHYSICIAN ASSISTANT

## 2020-11-23 PROCEDURE — 25000003 PHARM REV CODE 250: Mod: PN | Performed by: PHYSICIAN ASSISTANT

## 2020-11-23 PROCEDURE — 96413 CHEMO IV INFUSION 1 HR: CPT | Mod: PN

## 2020-11-23 RX ORDER — HEPARIN 100 UNIT/ML
500 SYRINGE INTRAVENOUS
Status: DISCONTINUED | OUTPATIENT
Start: 2020-11-23 | End: 2020-11-23 | Stop reason: HOSPADM

## 2020-11-23 RX ORDER — SODIUM CHLORIDE 0.9 % (FLUSH) 0.9 %
10 SYRINGE (ML) INJECTION
Status: DISCONTINUED | OUTPATIENT
Start: 2020-11-23 | End: 2020-11-23 | Stop reason: HOSPADM

## 2020-11-23 RX ADMIN — ATEZOLIZUMAB 1200 MG: 1200 INJECTION, SOLUTION INTRAVENOUS at 11:11

## 2020-11-23 RX ADMIN — SODIUM CHLORIDE: 900 INJECTION, SOLUTION INTRAVENOUS at 11:11

## 2020-12-09 NOTE — ASSESSMENT & PLAN NOTE
Takes protonix 40mg qday and famotidine 40mg qhs at home  -c/w protonix 40mg qday and famotidine 40mg qhs   type 2 dm, will continue pt's levemir as 10 units , possibly on humalog at home but not sure but van keep on humalog scale.

## 2020-12-14 ENCOUNTER — INFUSION (OUTPATIENT)
Dept: INFUSION THERAPY | Facility: HOSPITAL | Age: 70
End: 2020-12-14
Attending: NURSE PRACTITIONER
Payer: MEDICARE

## 2020-12-14 VITALS
HEIGHT: 70 IN | HEART RATE: 54 BPM | DIASTOLIC BLOOD PRESSURE: 78 MMHG | WEIGHT: 212.19 LBS | BODY MASS INDEX: 30.38 KG/M2 | RESPIRATION RATE: 18 BRPM | SYSTOLIC BLOOD PRESSURE: 123 MMHG | TEMPERATURE: 97 F

## 2020-12-14 DIAGNOSIS — C78.7 LIVER METASTASES: ICD-10-CM

## 2020-12-14 DIAGNOSIS — C64.9 RENAL CELL CARCINOMA, UNSPECIFIED LATERALITY: Primary | ICD-10-CM

## 2020-12-14 DIAGNOSIS — Z51.11 ENCOUNTER FOR ANTINEOPLASTIC CHEMOTHERAPY: ICD-10-CM

## 2020-12-14 PROCEDURE — 25000003 PHARM REV CODE 250: Mod: PN | Performed by: NURSE PRACTITIONER

## 2020-12-14 PROCEDURE — A4216 STERILE WATER/SALINE, 10 ML: HCPCS | Mod: PN | Performed by: NURSE PRACTITIONER

## 2020-12-14 PROCEDURE — 63600175 PHARM REV CODE 636 W HCPCS: Mod: PN | Performed by: NURSE PRACTITIONER

## 2020-12-14 PROCEDURE — 96413 CHEMO IV INFUSION 1 HR: CPT | Mod: PN

## 2020-12-14 RX ORDER — HEPARIN 100 UNIT/ML
500 SYRINGE INTRAVENOUS
Status: DISCONTINUED | OUTPATIENT
Start: 2020-12-14 | End: 2020-12-14 | Stop reason: HOSPADM

## 2020-12-14 RX ORDER — SODIUM CHLORIDE 0.9 % (FLUSH) 0.9 %
10 SYRINGE (ML) INJECTION
Status: DISCONTINUED | OUTPATIENT
Start: 2020-12-14 | End: 2020-12-14 | Stop reason: HOSPADM

## 2020-12-14 RX ADMIN — SODIUM CHLORIDE: 9 INJECTION, SOLUTION INTRAVENOUS at 12:12

## 2020-12-14 RX ADMIN — HEPARIN 500 UNITS: 100 SYRINGE at 01:12

## 2020-12-14 RX ADMIN — ATEZOLIZUMAB 1200 MG: 1200 INJECTION, SOLUTION INTRAVENOUS at 01:12

## 2020-12-14 RX ADMIN — Medication 10 ML: at 12:12

## 2021-01-04 ENCOUNTER — INFUSION (OUTPATIENT)
Dept: INFUSION THERAPY | Facility: HOSPITAL | Age: 71
End: 2021-01-04
Attending: NURSE PRACTITIONER
Payer: MEDICARE

## 2021-01-04 VITALS — SYSTOLIC BLOOD PRESSURE: 130 MMHG | HEART RATE: 87 BPM | DIASTOLIC BLOOD PRESSURE: 82 MMHG

## 2021-01-04 DIAGNOSIS — C78.7 LIVER METASTASES: ICD-10-CM

## 2021-01-04 DIAGNOSIS — Z51.11 ENCOUNTER FOR ANTINEOPLASTIC CHEMOTHERAPY: ICD-10-CM

## 2021-01-04 DIAGNOSIS — C64.9 RENAL CELL CARCINOMA, UNSPECIFIED LATERALITY: Primary | ICD-10-CM

## 2021-01-04 PROCEDURE — 25000003 PHARM REV CODE 250: Mod: PN | Performed by: PHYSICIAN ASSISTANT

## 2021-01-04 PROCEDURE — A4216 STERILE WATER/SALINE, 10 ML: HCPCS | Mod: PN | Performed by: PHYSICIAN ASSISTANT

## 2021-01-04 PROCEDURE — 96413 CHEMO IV INFUSION 1 HR: CPT | Mod: PN

## 2021-01-04 PROCEDURE — 63600175 PHARM REV CODE 636 W HCPCS: Mod: JG,PN | Performed by: PHYSICIAN ASSISTANT

## 2021-01-04 RX ORDER — HEPARIN 100 UNIT/ML
500 SYRINGE INTRAVENOUS
Status: DISCONTINUED | OUTPATIENT
Start: 2021-01-04 | End: 2021-01-04 | Stop reason: HOSPADM

## 2021-01-04 RX ORDER — SODIUM CHLORIDE 0.9 % (FLUSH) 0.9 %
10 SYRINGE (ML) INJECTION
Status: DISCONTINUED | OUTPATIENT
Start: 2021-01-04 | End: 2021-01-04 | Stop reason: HOSPADM

## 2021-01-04 RX ADMIN — SODIUM CHLORIDE: 9 INJECTION, SOLUTION INTRAVENOUS at 11:01

## 2021-01-04 RX ADMIN — HEPARIN 500 UNITS: 100 SYRINGE at 11:01

## 2021-01-04 RX ADMIN — ATEZOLIZUMAB 1200 MG: 1200 INJECTION, SOLUTION INTRAVENOUS at 11:01

## 2021-01-04 RX ADMIN — Medication 10 ML: at 11:01

## 2021-01-10 ENCOUNTER — IMMUNIZATION (OUTPATIENT)
Dept: FAMILY MEDICINE | Facility: CLINIC | Age: 71
End: 2021-01-10
Payer: MEDICARE

## 2021-01-10 DIAGNOSIS — Z23 NEED FOR VACCINATION: ICD-10-CM

## 2021-01-10 PROCEDURE — 91300 COVID-19, MRNA, LNP-S, PF, 30 MCG/0.3 ML DOSE VACCINE: CPT | Mod: PBBFAC,PO

## 2021-01-25 ENCOUNTER — INFUSION (OUTPATIENT)
Dept: INFUSION THERAPY | Facility: HOSPITAL | Age: 71
End: 2021-01-25
Attending: NURSE PRACTITIONER
Payer: MEDICARE

## 2021-01-25 VITALS
BODY MASS INDEX: 30.62 KG/M2 | TEMPERATURE: 98 F | RESPIRATION RATE: 18 BRPM | DIASTOLIC BLOOD PRESSURE: 68 MMHG | HEIGHT: 70 IN | SYSTOLIC BLOOD PRESSURE: 113 MMHG | HEART RATE: 69 BPM | WEIGHT: 213.88 LBS

## 2021-01-25 DIAGNOSIS — C78.7 LIVER METASTASES: ICD-10-CM

## 2021-01-25 DIAGNOSIS — Z51.11 ENCOUNTER FOR ANTINEOPLASTIC CHEMOTHERAPY: ICD-10-CM

## 2021-01-25 DIAGNOSIS — C64.9 RENAL CELL CARCINOMA, UNSPECIFIED LATERALITY: Primary | ICD-10-CM

## 2021-01-25 PROCEDURE — 96413 CHEMO IV INFUSION 1 HR: CPT | Mod: PN

## 2021-01-25 PROCEDURE — 63600175 PHARM REV CODE 636 W HCPCS: Mod: PN | Performed by: PHYSICIAN ASSISTANT

## 2021-01-25 PROCEDURE — 25000003 PHARM REV CODE 250: Mod: PN | Performed by: PHYSICIAN ASSISTANT

## 2021-01-25 RX ORDER — HEPARIN 100 UNIT/ML
500 SYRINGE INTRAVENOUS
Status: DISCONTINUED | OUTPATIENT
Start: 2021-01-25 | End: 2021-01-25 | Stop reason: HOSPADM

## 2021-01-25 RX ORDER — SODIUM CHLORIDE 0.9 % (FLUSH) 0.9 %
10 SYRINGE (ML) INJECTION
Status: DISCONTINUED | OUTPATIENT
Start: 2021-01-25 | End: 2021-01-25 | Stop reason: HOSPADM

## 2021-01-25 RX ADMIN — SODIUM CHLORIDE: 900 INJECTION, SOLUTION INTRAVENOUS at 11:01

## 2021-01-25 RX ADMIN — HEPARIN SODIUM (PORCINE) LOCK FLUSH IV SOLN 100 UNIT/ML 500 UNITS: 100 SOLUTION at 01:01

## 2021-01-25 RX ADMIN — ATEZOLIZUMAB 1200 MG: 1200 INJECTION, SOLUTION INTRAVENOUS at 11:01

## 2021-01-31 ENCOUNTER — IMMUNIZATION (OUTPATIENT)
Dept: FAMILY MEDICINE | Facility: CLINIC | Age: 71
End: 2021-01-31
Payer: MEDICARE

## 2021-01-31 DIAGNOSIS — Z23 NEED FOR VACCINATION: Primary | ICD-10-CM

## 2021-01-31 PROCEDURE — 91300 COVID-19, MRNA, LNP-S, PF, 30 MCG/0.3 ML DOSE VACCINE: CPT | Mod: PBBFAC | Performed by: INTERNAL MEDICINE

## 2021-01-31 PROCEDURE — 0002A COVID-19, MRNA, LNP-S, PF, 30 MCG/0.3 ML DOSE VACCINE: CPT | Mod: PBBFAC | Performed by: INTERNAL MEDICINE

## 2021-02-15 ENCOUNTER — INFUSION (OUTPATIENT)
Dept: INFUSION THERAPY | Facility: HOSPITAL | Age: 71
End: 2021-02-15
Attending: NURSE PRACTITIONER
Payer: MEDICARE

## 2021-02-15 VITALS
WEIGHT: 213.19 LBS | DIASTOLIC BLOOD PRESSURE: 64 MMHG | HEART RATE: 57 BPM | BODY MASS INDEX: 30.52 KG/M2 | SYSTOLIC BLOOD PRESSURE: 124 MMHG | RESPIRATION RATE: 18 BRPM | TEMPERATURE: 98 F | OXYGEN SATURATION: 95 % | HEIGHT: 70 IN

## 2021-02-15 DIAGNOSIS — Z51.11 ENCOUNTER FOR ANTINEOPLASTIC CHEMOTHERAPY: ICD-10-CM

## 2021-02-15 DIAGNOSIS — C64.9 RENAL CELL CARCINOMA, UNSPECIFIED LATERALITY: Primary | ICD-10-CM

## 2021-02-15 DIAGNOSIS — C78.7 LIVER METASTASES: ICD-10-CM

## 2021-02-15 PROCEDURE — 96413 CHEMO IV INFUSION 1 HR: CPT | Mod: PN

## 2021-02-15 PROCEDURE — 25000003 PHARM REV CODE 250: Mod: PN | Performed by: PHYSICIAN ASSISTANT

## 2021-02-15 PROCEDURE — 63600175 PHARM REV CODE 636 W HCPCS: Mod: JG,PN | Performed by: PHYSICIAN ASSISTANT

## 2021-02-15 RX ORDER — HEPARIN 100 UNIT/ML
500 SYRINGE INTRAVENOUS
Status: DISCONTINUED | OUTPATIENT
Start: 2021-02-15 | End: 2021-02-15 | Stop reason: HOSPADM

## 2021-02-15 RX ADMIN — HEPARIN 500 UNITS: 100 SYRINGE at 11:02

## 2021-02-15 RX ADMIN — SODIUM CHLORIDE: 9 INJECTION, SOLUTION INTRAVENOUS at 10:02

## 2021-02-15 RX ADMIN — ATEZOLIZUMAB 1200 MG: 1200 INJECTION, SOLUTION INTRAVENOUS at 10:02

## 2021-02-22 ENCOUNTER — LAB VISIT (OUTPATIENT)
Dept: LAB | Facility: HOSPITAL | Age: 71
End: 2021-02-22
Attending: NURSE PRACTITIONER
Payer: MEDICARE

## 2021-02-22 DIAGNOSIS — C64.2 MALIGNANT NEOPLASM OF LEFT KIDNEY: ICD-10-CM

## 2021-02-22 DIAGNOSIS — R19.7 DIARRHEA, UNSPECIFIED TYPE: ICD-10-CM

## 2021-02-22 LAB
ALBUMIN SERPL BCP-MCNC: 3.9 G/DL (ref 3.5–5.2)
ALP SERPL-CCNC: 79 U/L (ref 38–145)
ALT SERPL W/O P-5'-P-CCNC: 19 U/L (ref 0–50)
ANION GAP SERPL CALC-SCNC: 7 MMOL/L (ref 8–16)
AST SERPL-CCNC: 29 U/L (ref 17–59)
BASOPHILS # BLD AUTO: 0.03 K/UL (ref 0–0.2)
BASOPHILS NFR BLD: 0.4 % (ref 0–1.9)
BILIRUB SERPL-MCNC: 0.6 MG/DL (ref 0.2–1.3)
C DIFF TOX GENS STL QL NAA+PROBE: NEGATIVE
CALCIUM SERPL-MCNC: 9.2 MG/DL (ref 8.4–10.2)
CHLORIDE SERPL-SCNC: 107 MMOL/L (ref 95–110)
CO2 SERPL-SCNC: 26 MMOL/L (ref 22–31)
CREAT SERPL-MCNC: 1.6 MG/DL (ref 0.5–1.4)
DIFFERENTIAL METHOD: ABNORMAL
EOSINOPHIL # BLD AUTO: 0.1 K/UL (ref 0–0.5)
EOSINOPHIL NFR BLD: 1.9 % (ref 0–8)
ERYTHROCYTE [DISTWIDTH] IN BLOOD BY AUTOMATED COUNT: 13.2 % (ref 11.5–14.5)
EST. GFR  (AFRICAN AMERICAN): 50 ML/MIN/1.73 M^2
EST. GFR  (NON AFRICAN AMERICAN): 43 ML/MIN/1.73 M^2
GLUCOSE SERPL-MCNC: 139 MG/DL (ref 70–110)
HCT VFR BLD AUTO: 45.9 % (ref 40–54)
HGB BLD-MCNC: 14.8 G/DL (ref 14–18)
IMM GRANULOCYTES # BLD AUTO: 0.02 K/UL (ref 0–0.04)
IMM GRANULOCYTES NFR BLD AUTO: 0.3 % (ref 0–0.5)
LYMPHOCYTES # BLD AUTO: 1.5 K/UL (ref 1–4.8)
LYMPHOCYTES NFR BLD: 19.4 % (ref 18–48)
MAGNESIUM SERPL-MCNC: 2.1 MG/DL (ref 1.6–2.6)
MCH RBC QN AUTO: 31.2 PG (ref 27–31)
MCHC RBC AUTO-ENTMCNC: 32.2 G/DL (ref 32–36)
MCV RBC AUTO: 97 FL (ref 82–98)
MONOCYTES # BLD AUTO: 0.9 K/UL (ref 0.3–1)
MONOCYTES NFR BLD: 11.9 % (ref 4–15)
NEUTROPHILS # BLD AUTO: 4.9 K/UL (ref 1.8–7.7)
NEUTROPHILS NFR BLD: 66.1 % (ref 38–73)
NRBC BLD-RTO: 0 /100 WBC
OB PNL STL: POSITIVE
PLATELET # BLD AUTO: 126 K/UL (ref 150–350)
PMV BLD AUTO: 10 FL (ref 9.2–12.9)
POTASSIUM SERPL-SCNC: 4.1 MMOL/L (ref 3.5–5.1)
PROT SERPL-MCNC: 6.7 G/DL (ref 6–8.4)
RBC # BLD AUTO: 4.75 M/UL (ref 4.6–6.2)
SODIUM SERPL-SCNC: 140 MMOL/L (ref 136–145)
UUN UR-MCNC: 14 MG/DL (ref 9–21)
WBC # BLD AUTO: 7.46 K/UL (ref 3.9–12.7)

## 2021-02-22 PROCEDURE — 36415 COLL VENOUS BLD VENIPUNCTURE: CPT | Mod: PN

## 2021-02-22 PROCEDURE — 85025 COMPLETE CBC W/AUTO DIFF WBC: CPT | Mod: PN

## 2021-02-22 PROCEDURE — 87046 STOOL CULTR AEROBIC BACT EA: CPT | Mod: 59

## 2021-02-22 PROCEDURE — 87046 STOOL CULTR AEROBIC BACT EA: CPT | Mod: 59,PN

## 2021-02-22 PROCEDURE — 82270 OCCULT BLOOD FECES: CPT

## 2021-02-22 PROCEDURE — 87045 FECES CULTURE AEROBIC BACT: CPT | Mod: PN

## 2021-02-22 PROCEDURE — 83735 ASSAY OF MAGNESIUM: CPT

## 2021-02-22 PROCEDURE — 87493 C DIFF AMPLIFIED PROBE: CPT | Mod: PN

## 2021-02-22 PROCEDURE — 87493 C DIFF AMPLIFIED PROBE: CPT

## 2021-02-22 PROCEDURE — 85025 COMPLETE CBC W/AUTO DIFF WBC: CPT

## 2021-02-22 PROCEDURE — 80053 COMPREHEN METABOLIC PANEL: CPT | Mod: PN

## 2021-02-22 PROCEDURE — 83735 ASSAY OF MAGNESIUM: CPT | Mod: PN

## 2021-02-22 PROCEDURE — 80053 COMPREHEN METABOLIC PANEL: CPT

## 2021-02-22 PROCEDURE — 87045 FECES CULTURE AEROBIC BACT: CPT

## 2021-02-22 PROCEDURE — 87427 SHIGA-LIKE TOXIN AG IA: CPT | Mod: 59

## 2021-02-22 PROCEDURE — 82270 OCCULT BLOOD FECES: CPT | Mod: PN

## 2021-02-23 ENCOUNTER — LAB VISIT (OUTPATIENT)
Dept: LAB | Facility: HOSPITAL | Age: 71
End: 2021-02-23
Attending: PHYSICIAN ASSISTANT
Payer: MEDICARE

## 2021-02-23 DIAGNOSIS — R19.7 DIARRHEA, UNSPECIFIED TYPE: ICD-10-CM

## 2021-02-23 LAB
E COLI SXT1 STL QL IA: NEGATIVE
E COLI SXT2 STL QL IA: NEGATIVE

## 2021-02-23 PROCEDURE — 87209 SMEAR COMPLEX STAIN: CPT

## 2021-02-23 PROCEDURE — 87209 SMEAR COMPLEX STAIN: CPT | Mod: PN

## 2021-02-24 LAB — O+P STL TRI STN: NORMAL

## 2021-02-25 LAB
BACTERIA STL CULT: NORMAL
BACTERIA STL CULT: NORMAL

## 2021-02-26 ENCOUNTER — DOCUMENTATION ONLY (OUTPATIENT)
Dept: INFUSION THERAPY | Facility: HOSPITAL | Age: 71
End: 2021-02-26

## 2021-02-26 ENCOUNTER — INFUSION (OUTPATIENT)
Dept: INFUSION THERAPY | Facility: HOSPITAL | Age: 71
End: 2021-02-26
Attending: NURSE PRACTITIONER
Payer: MEDICARE

## 2021-02-26 VITALS
DIASTOLIC BLOOD PRESSURE: 84 MMHG | HEART RATE: 91 BPM | SYSTOLIC BLOOD PRESSURE: 136 MMHG | BODY MASS INDEX: 29.61 KG/M2 | TEMPERATURE: 98 F | HEIGHT: 71 IN | RESPIRATION RATE: 22 BRPM

## 2021-02-26 DIAGNOSIS — Z51.11 ENCOUNTER FOR ANTINEOPLASTIC CHEMOTHERAPY: ICD-10-CM

## 2021-02-26 DIAGNOSIS — C78.7 LIVER METASTASES: ICD-10-CM

## 2021-02-26 DIAGNOSIS — C64.2 MALIGNANT NEOPLASM OF LEFT KIDNEY: Primary | ICD-10-CM

## 2021-02-26 PROBLEM — Z71.89 ACP (ADVANCE CARE PLANNING): Status: ACTIVE | Noted: 2021-02-26

## 2021-02-26 PROBLEM — R19.7 DIARRHEA: Status: ACTIVE | Noted: 2021-02-26

## 2021-02-26 PROBLEM — E86.1 INTRAVASCULAR VOLUME DEPLETION: Status: ACTIVE | Noted: 2021-02-26

## 2021-02-26 PROBLEM — H11.439 HYPEREMIA OF CONJUNCTIVA: Status: ACTIVE | Noted: 2021-02-26

## 2021-02-26 PROCEDURE — 96361 HYDRATE IV INFUSION ADD-ON: CPT | Mod: PN

## 2021-02-26 PROCEDURE — 96360 HYDRATION IV INFUSION INIT: CPT | Mod: PN

## 2021-02-26 PROCEDURE — 63600175 PHARM REV CODE 636 W HCPCS: Mod: PN | Performed by: NURSE PRACTITIONER

## 2021-02-26 PROCEDURE — 25000003 PHARM REV CODE 250: Mod: PN | Performed by: NURSE PRACTITIONER

## 2021-02-26 RX ORDER — HEPARIN 100 UNIT/ML
500 SYRINGE INTRAVENOUS
Status: DISCONTINUED | OUTPATIENT
Start: 2021-02-26 | End: 2021-02-26 | Stop reason: HOSPADM

## 2021-02-26 RX ORDER — HEPARIN 100 UNIT/ML
500 SYRINGE INTRAVENOUS
Status: CANCELLED | OUTPATIENT
Start: 2021-02-26

## 2021-02-26 RX ORDER — SODIUM CHLORIDE 0.9 % (FLUSH) 0.9 %
10 SYRINGE (ML) INJECTION
Status: DISCONTINUED | OUTPATIENT
Start: 2021-02-26 | End: 2021-02-26 | Stop reason: HOSPADM

## 2021-02-26 RX ORDER — SODIUM CHLORIDE 0.9 % (FLUSH) 0.9 %
10 SYRINGE (ML) INJECTION
Status: CANCELLED | OUTPATIENT
Start: 2021-02-26

## 2021-02-26 RX ADMIN — HEPARIN 500 UNITS: 100 SYRINGE at 12:02

## 2021-02-26 RX ADMIN — SODIUM CHLORIDE 1000 ML: 0.9 INJECTION, SOLUTION INTRAVENOUS at 10:02

## 2021-03-01 PROBLEM — K52.9 ACUTE COLITIS: Status: ACTIVE | Noted: 2021-03-01

## 2021-03-08 ENCOUNTER — LAB VISIT (OUTPATIENT)
Dept: LAB | Facility: HOSPITAL | Age: 71
End: 2021-03-08
Attending: PHYSICIAN ASSISTANT
Payer: MEDICARE

## 2021-03-08 DIAGNOSIS — E03.2 HYPOTHYROIDISM DUE TO MEDICATION: ICD-10-CM

## 2021-03-08 DIAGNOSIS — C64.2 MALIGNANT NEOPLASM OF LEFT KIDNEY: ICD-10-CM

## 2021-03-08 LAB
ALBUMIN SERPL BCP-MCNC: 3.3 G/DL (ref 3.5–5.2)
ALP SERPL-CCNC: 99 U/L (ref 38–145)
ALT SERPL W/O P-5'-P-CCNC: 31 U/L (ref 0–50)
ANION GAP SERPL CALC-SCNC: 5 MMOL/L (ref 8–16)
AST SERPL-CCNC: 35 U/L (ref 17–59)
BASOPHILS # BLD AUTO: 0.04 K/UL (ref 0–0.2)
BASOPHILS NFR BLD: 0.2 % (ref 0–1.9)
BILIRUB SERPL-MCNC: 0.6 MG/DL (ref 0.2–1.3)
CALCIUM SERPL-MCNC: 9 MG/DL (ref 8.4–10.2)
CHLORIDE SERPL-SCNC: 104 MMOL/L (ref 95–110)
CO2 SERPL-SCNC: 29 MMOL/L (ref 22–31)
CREAT SERPL-MCNC: 1.46 MG/DL (ref 0.5–1.4)
DIFFERENTIAL METHOD: ABNORMAL
EOSINOPHIL # BLD AUTO: 0 K/UL (ref 0–0.5)
EOSINOPHIL NFR BLD: 0 % (ref 0–8)
ERYTHROCYTE [DISTWIDTH] IN BLOOD BY AUTOMATED COUNT: 14 % (ref 11.5–14.5)
EST. GFR  (AFRICAN AMERICAN): 56 ML/MIN/1.73 M^2
EST. GFR  (NON AFRICAN AMERICAN): 48 ML/MIN/1.73 M^2
GLUCOSE SERPL-MCNC: 120 MG/DL (ref 70–110)
HCT VFR BLD AUTO: 44.3 % (ref 40–54)
HGB BLD-MCNC: 14.3 G/DL (ref 14–18)
IMM GRANULOCYTES # BLD AUTO: 0.64 K/UL (ref 0–0.04)
IMM GRANULOCYTES NFR BLD AUTO: 3.8 % (ref 0–0.5)
LYMPHOCYTES # BLD AUTO: 1 K/UL (ref 1–4.8)
LYMPHOCYTES NFR BLD: 6.1 % (ref 18–48)
MCH RBC QN AUTO: 30.8 PG (ref 27–31)
MCHC RBC AUTO-ENTMCNC: 32.3 G/DL (ref 32–36)
MCV RBC AUTO: 96 FL (ref 82–98)
MONOCYTES # BLD AUTO: 1.5 K/UL (ref 0.3–1)
MONOCYTES NFR BLD: 8.9 % (ref 4–15)
NEUTROPHILS # BLD AUTO: 13.8 K/UL (ref 1.8–7.7)
NEUTROPHILS NFR BLD: 81 % (ref 38–73)
NRBC BLD-RTO: 0 /100 WBC
PLATELET # BLD AUTO: 196 K/UL (ref 150–350)
PMV BLD AUTO: 8.7 FL (ref 9.2–12.9)
POTASSIUM SERPL-SCNC: 5.6 MMOL/L (ref 3.5–5.1)
PROT SERPL-MCNC: 5.9 G/DL (ref 6–8.4)
RBC # BLD AUTO: 4.64 M/UL (ref 4.6–6.2)
SODIUM SERPL-SCNC: 138 MMOL/L (ref 136–145)
TSH SERPL DL<=0.005 MIU/L-ACNC: 0.32 UIU/ML (ref 0.4–4)
UUN UR-MCNC: 33 MG/DL (ref 9–21)
WBC # BLD AUTO: 17.01 K/UL (ref 3.9–12.7)

## 2021-03-08 PROCEDURE — 84443 ASSAY THYROID STIM HORMONE: CPT | Performed by: NURSE PRACTITIONER

## 2021-03-08 PROCEDURE — 36415 COLL VENOUS BLD VENIPUNCTURE: CPT | Mod: PN | Performed by: NURSE PRACTITIONER

## 2021-03-08 PROCEDURE — 84443 ASSAY THYROID STIM HORMONE: CPT | Mod: PN | Performed by: NURSE PRACTITIONER

## 2021-03-08 PROCEDURE — 80053 COMPREHEN METABOLIC PANEL: CPT | Mod: PN | Performed by: NURSE PRACTITIONER

## 2021-03-08 PROCEDURE — 85025 COMPLETE CBC W/AUTO DIFF WBC: CPT | Mod: PN | Performed by: NURSE PRACTITIONER

## 2021-03-08 PROCEDURE — 85025 COMPLETE CBC W/AUTO DIFF WBC: CPT | Performed by: NURSE PRACTITIONER

## 2021-03-08 PROCEDURE — 80053 COMPREHEN METABOLIC PANEL: CPT | Performed by: NURSE PRACTITIONER

## 2021-03-26 ENCOUNTER — LAB VISIT (OUTPATIENT)
Dept: LAB | Facility: HOSPITAL | Age: 71
End: 2021-03-26
Attending: NURSE PRACTITIONER
Payer: MEDICARE

## 2021-03-26 DIAGNOSIS — C64.2 MALIGNANT NEOPLASM OF LEFT KIDNEY: ICD-10-CM

## 2021-03-26 DIAGNOSIS — E03.2 HYPOTHYROIDISM DUE TO MEDICATION: ICD-10-CM

## 2021-03-26 LAB
ALBUMIN SERPL BCP-MCNC: 3.2 G/DL (ref 3.5–5.2)
ALP SERPL-CCNC: 142 U/L (ref 38–145)
ALT SERPL W/O P-5'-P-CCNC: 26 U/L (ref 0–50)
ANION GAP SERPL CALC-SCNC: 3 MMOL/L (ref 8–16)
AST SERPL-CCNC: 31 U/L (ref 17–59)
BASOPHILS # BLD AUTO: 0.02 K/UL (ref 0–0.2)
BASOPHILS NFR BLD: 0.2 % (ref 0–1.9)
BILIRUB SERPL-MCNC: 0.5 MG/DL (ref 0.2–1.3)
CALCIUM SERPL-MCNC: 8.8 MG/DL (ref 8.4–10.2)
CHLORIDE SERPL-SCNC: 101 MMOL/L (ref 95–110)
CO2 SERPL-SCNC: 31 MMOL/L (ref 22–31)
CREAT SERPL-MCNC: 1.54 MG/DL (ref 0.5–1.4)
DIFFERENTIAL METHOD: ABNORMAL
EOSINOPHIL # BLD AUTO: 0.1 K/UL (ref 0–0.5)
EOSINOPHIL NFR BLD: 1.3 % (ref 0–8)
ERYTHROCYTE [DISTWIDTH] IN BLOOD BY AUTOMATED COUNT: 14.3 % (ref 11.5–14.5)
EST. GFR  (AFRICAN AMERICAN): 52 ML/MIN/1.73 M^2
EST. GFR  (NON AFRICAN AMERICAN): 45 ML/MIN/1.73 M^2
GLUCOSE SERPL-MCNC: 87 MG/DL (ref 70–110)
HCT VFR BLD AUTO: 43.9 % (ref 40–54)
HGB BLD-MCNC: 13.8 G/DL (ref 14–18)
IMM GRANULOCYTES # BLD AUTO: 0.06 K/UL (ref 0–0.04)
IMM GRANULOCYTES NFR BLD AUTO: 0.7 % (ref 0–0.5)
LYMPHOCYTES # BLD AUTO: 2.9 K/UL (ref 1–4.8)
LYMPHOCYTES NFR BLD: 34.4 % (ref 18–48)
MCH RBC QN AUTO: 30.2 PG (ref 27–31)
MCHC RBC AUTO-ENTMCNC: 31.4 G/DL (ref 32–36)
MCV RBC AUTO: 96 FL (ref 82–98)
MONOCYTES # BLD AUTO: 1 K/UL (ref 0.3–1)
MONOCYTES NFR BLD: 12.1 % (ref 4–15)
NEUTROPHILS # BLD AUTO: 4.3 K/UL (ref 1.8–7.7)
NEUTROPHILS NFR BLD: 51.3 % (ref 38–73)
NRBC BLD-RTO: 0 /100 WBC
PLATELET # BLD AUTO: 125 K/UL (ref 150–350)
PMV BLD AUTO: 9.1 FL (ref 9.2–12.9)
POTASSIUM SERPL-SCNC: 4.8 MMOL/L (ref 3.5–5.1)
PROT SERPL-MCNC: 5.8 G/DL (ref 6–8.4)
RBC # BLD AUTO: 4.57 M/UL (ref 4.6–6.2)
SODIUM SERPL-SCNC: 135 MMOL/L (ref 136–145)
TSH SERPL DL<=0.005 MIU/L-ACNC: 9.66 UIU/ML (ref 0.4–4)
UUN UR-MCNC: 33 MG/DL (ref 9–21)
WBC # BLD AUTO: 8.46 K/UL (ref 3.9–12.7)

## 2021-03-26 PROCEDURE — 80053 COMPREHEN METABOLIC PANEL: CPT | Mod: PN | Performed by: NURSE PRACTITIONER

## 2021-03-26 PROCEDURE — 84443 ASSAY THYROID STIM HORMONE: CPT | Performed by: NURSE PRACTITIONER

## 2021-03-26 PROCEDURE — 85025 COMPLETE CBC W/AUTO DIFF WBC: CPT | Mod: PN | Performed by: NURSE PRACTITIONER

## 2021-03-26 PROCEDURE — 36415 COLL VENOUS BLD VENIPUNCTURE: CPT | Mod: PN | Performed by: NURSE PRACTITIONER

## 2021-03-26 PROCEDURE — 84443 ASSAY THYROID STIM HORMONE: CPT | Mod: PN | Performed by: NURSE PRACTITIONER

## 2021-03-26 PROCEDURE — 80053 COMPREHEN METABOLIC PANEL: CPT | Performed by: NURSE PRACTITIONER

## 2021-03-26 PROCEDURE — 85025 COMPLETE CBC W/AUTO DIFF WBC: CPT | Performed by: NURSE PRACTITIONER

## 2021-04-19 ENCOUNTER — LAB VISIT (OUTPATIENT)
Dept: LAB | Facility: HOSPITAL | Age: 71
End: 2021-04-19
Attending: NURSE PRACTITIONER
Payer: MEDICARE

## 2021-04-19 DIAGNOSIS — C64.2 MALIGNANT NEOPLASM OF LEFT KIDNEY: ICD-10-CM

## 2021-04-19 LAB
ALBUMIN SERPL BCP-MCNC: 3.6 G/DL (ref 3.5–5.2)
ALP SERPL-CCNC: 140 U/L (ref 38–145)
ALT SERPL W/O P-5'-P-CCNC: 23 U/L (ref 0–50)
ANION GAP SERPL CALC-SCNC: 6 MMOL/L (ref 8–16)
AST SERPL-CCNC: 28 U/L (ref 17–59)
BASOPHILS # BLD AUTO: 0.06 K/UL (ref 0–0.2)
BASOPHILS NFR BLD: 0.4 % (ref 0–1.9)
BILIRUB SERPL-MCNC: 0.5 MG/DL (ref 0.2–1.3)
CALCIUM SERPL-MCNC: 9 MG/DL (ref 8.4–10.2)
CHLORIDE SERPL-SCNC: 105 MMOL/L (ref 95–110)
CO2 SERPL-SCNC: 28 MMOL/L (ref 22–31)
CREAT SERPL-MCNC: 1.57 MG/DL (ref 0.5–1.4)
DIFFERENTIAL METHOD: ABNORMAL
EOSINOPHIL # BLD AUTO: 0.1 K/UL (ref 0–0.5)
EOSINOPHIL NFR BLD: 0.3 % (ref 0–8)
ERYTHROCYTE [DISTWIDTH] IN BLOOD BY AUTOMATED COUNT: 14.4 % (ref 11.5–14.5)
EST. GFR  (AFRICAN AMERICAN): 51 ML/MIN/1.73 M^2
EST. GFR  (NON AFRICAN AMERICAN): 44 ML/MIN/1.73 M^2
GLUCOSE SERPL-MCNC: 122 MG/DL (ref 70–110)
HCT VFR BLD AUTO: 44.2 % (ref 40–54)
HGB BLD-MCNC: 14.1 G/DL (ref 14–18)
IMM GRANULOCYTES # BLD AUTO: 0.17 K/UL (ref 0–0.04)
IMM GRANULOCYTES NFR BLD AUTO: 1.1 % (ref 0–0.5)
LYMPHOCYTES # BLD AUTO: 2.8 K/UL (ref 1–4.8)
LYMPHOCYTES NFR BLD: 17.1 % (ref 18–48)
MCH RBC QN AUTO: 30.4 PG (ref 27–31)
MCHC RBC AUTO-ENTMCNC: 31.9 G/DL (ref 32–36)
MCV RBC AUTO: 95 FL (ref 82–98)
MONOCYTES # BLD AUTO: 1.3 K/UL (ref 0.3–1)
MONOCYTES NFR BLD: 7.9 % (ref 4–15)
NEUTROPHILS # BLD AUTO: 11.7 K/UL (ref 1.8–7.7)
NEUTROPHILS NFR BLD: 73.2 % (ref 38–73)
NRBC BLD-RTO: 0 /100 WBC
PLATELET # BLD AUTO: 147 K/UL (ref 150–450)
PMV BLD AUTO: 9.6 FL (ref 9.2–12.9)
POTASSIUM SERPL-SCNC: 4.7 MMOL/L (ref 3.5–5.1)
PROT SERPL-MCNC: 6.2 G/DL (ref 6–8.4)
RBC # BLD AUTO: 4.64 M/UL (ref 4.6–6.2)
SODIUM SERPL-SCNC: 139 MMOL/L (ref 136–145)
UUN UR-MCNC: 30 MG/DL (ref 9–21)
WBC # BLD AUTO: 16.04 K/UL (ref 3.9–12.7)

## 2021-04-19 PROCEDURE — 80053 COMPREHEN METABOLIC PANEL: CPT | Performed by: NURSE PRACTITIONER

## 2021-04-19 PROCEDURE — 85025 COMPLETE CBC W/AUTO DIFF WBC: CPT | Mod: PN | Performed by: NURSE PRACTITIONER

## 2021-04-19 PROCEDURE — 36415 COLL VENOUS BLD VENIPUNCTURE: CPT | Mod: PN | Performed by: NURSE PRACTITIONER

## 2021-04-19 PROCEDURE — 80053 COMPREHEN METABOLIC PANEL: CPT | Mod: PN | Performed by: NURSE PRACTITIONER

## 2021-04-19 PROCEDURE — 85025 COMPLETE CBC W/AUTO DIFF WBC: CPT | Performed by: NURSE PRACTITIONER

## 2021-05-10 ENCOUNTER — INFUSION (OUTPATIENT)
Dept: INFUSION THERAPY | Facility: HOSPITAL | Age: 71
End: 2021-05-10
Attending: NURSE PRACTITIONER
Payer: MEDICARE

## 2021-05-10 DIAGNOSIS — C64.2 MALIGNANT NEOPLASM OF LEFT KIDNEY: Primary | ICD-10-CM

## 2021-05-10 DIAGNOSIS — C78.7 LIVER METASTASES: ICD-10-CM

## 2021-05-10 DIAGNOSIS — Z51.11 ENCOUNTER FOR ANTINEOPLASTIC CHEMOTHERAPY: ICD-10-CM

## 2021-05-10 PROCEDURE — A4216 STERILE WATER/SALINE, 10 ML: HCPCS | Mod: PN | Performed by: NURSE PRACTITIONER

## 2021-05-10 PROCEDURE — 25000003 PHARM REV CODE 250: Mod: PN | Performed by: NURSE PRACTITIONER

## 2021-05-10 PROCEDURE — 96523 IRRIG DRUG DELIVERY DEVICE: CPT | Mod: PN

## 2021-05-10 RX ORDER — HEPARIN 100 UNIT/ML
500 SYRINGE INTRAVENOUS
Status: CANCELLED | OUTPATIENT
Start: 2021-05-10

## 2021-05-10 RX ORDER — SODIUM CHLORIDE 0.9 % (FLUSH) 0.9 %
10 SYRINGE (ML) INJECTION
Status: CANCELLED | OUTPATIENT
Start: 2021-05-10

## 2021-05-10 RX ORDER — SODIUM CHLORIDE 0.9 % (FLUSH) 0.9 %
10 SYRINGE (ML) INJECTION
Status: DISCONTINUED | OUTPATIENT
Start: 2021-05-10 | End: 2021-05-10 | Stop reason: HOSPADM

## 2021-05-10 RX ADMIN — Medication 10 ML: at 10:05

## 2021-11-05 NOTE — PLAN OF CARE
Problem: Adult Inpatient Plan of Care  Goal: Patient-Specific Goal (Individualization)  Outcome: Ongoing (interventions implemented as appropriate)  Tolerated infusion without difficulty.       Infliximab Pregnancy And Lactation Text: This medication is Pregnancy Category B and is considered safe during pregnancy. It is unknown if this medication is excreted in breast milk.

## 2022-06-14 NOTE — Clinical Note
Appointment scheduled with . message sent to pt informing him of details.   Please set him up for a liter on N/S on Wednesday and anotehr one on Friday at the [facility]. See me Monday with lisset

## 2022-11-09 NOTE — PROGRESS NOTES
Subjective:       Patient ID: Bud Russo III is a 67 y.o. male.    Chief Complaint: No chief complaint on file.    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  He has been on the combination of lenvatinib plus everolimus for six weeks now, and he has tolerated it extremely well.  The dose of lenvatinib has been 10 mg QD.    His restaging scans from last week were reviewed with Dr. Modi.  The dominant liver lesion has shrunk significantly, from more than 11 cm to 6.5 cm.  Some of the other liver lesions though have slightly increased in size.     His CBC shows a WBC count of 9,000 /mm3, Hg 10.1 gr/dl, Ht 33.7 % and platelets 122,000 /mm3.  His creatinine is 1.7 mg/dl,  albumin 2.3 gr/dl, and and his bilirubin is 0.6 mg/dl.  His AST is 84 and his ALT is 53 U/L.    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  His diagnosis was established on April 30th when he presented to an Emergency Room in [place], [place], where he lived with left flank pain.  Radiologic studies showed a mass in his left kidney.  Subsequently, he went to [place], [place], where he was staged and was found to have a mass in his liver.  On 05/23/2017, he   underwent an embolization of his tumor in preparation for a possible liver resection; and on 06/06/2017, he underwent a left nephrectomy.  He remained in the [place] area in preparation of a liver resection; however, an MRI of his abdomen and pelvis that was done showed that there was significant progression of the metastatic liver disease with interval increase in size of a dominant right hepatic lobe mass from 6.1 cm previously to 11.2 cm with evidence of internal necrosis and peripheral enhancement.  In addition, multiple new lesions were identified in both hepatic lobes, measuring up to 1.9 cm in the left lateral lobe and 1.2 cm in the right hepatic lobe.  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  The patient met with an oncologist at [place], and he was offered chemotherapy.  He had decided to return to [place] and receive chemotherapy at our facility.  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  He started chemotherapy several months ago, and completed three cycles.   Unfortunately the staging MRI scan showed that compared to his prior CT of July 19, 2017 there has been an interval increase in number and size of the foci of hepatic metastatic disease within the liver, hence the initiation of second line treatment with the combination of lenvatinib plus afinitor, which he has tolerated well so far.  Repeat abdominal MRi scan last week with results as mentioned above.    Review of Systems      Overall he feels  "quite well".  He has not had a fever for the last month.  His appetite has increased.  He denies any pain.  He mentions that he ahs been experiencing intermittent epistaxis.  He denies any anxiety, depression, easy bruising, fevers, chills, night  sweats, weight loss, nausea, vomiting, diarrhea, constipation, diplopia, blurred vision, headache, chest pain, palpitations, shortness of breath, breast pain, abdominal pain, extremity pain, or difficulty ambulating.  The remainder of the ten-point ROS, including general, skin, lymph, H/N, cardiorespiratory, GI, , Neuro, Endocrine, and psychiatric is negative.     Objective:      Physical Exam    He is alert, oriented to time, place, person, pleasant, well      nourished, in no acute physical distress.  He is here with his wife.                                  VITAL SIGNS:  Reviewed                                      HEENT:  Normal.  There are no nasal, oral, lip, gingival, auricular, lid,    or conjunctival lesions.  Mucosae are moist and pink, and there " is no        thrush.  Pupils are equal, reactive to light and accommodation.              Extraocular muscle movements are intact.     Dentition is good.                                  NECK:  Supple without JVD, adenopathy, or thyromegaly.                       LUNGS:  Clear to auscultation without wheezing, rales, or rhonchi.           CARDIOVASCULAR:  Reveals an S1, S2, no murmurs, no rubs, no gallops.         ABDOMEN:  Soft, nontender, without organomegaly.  Bowel sounds are  present.  A scar from his left nephrectomy is identified.     Scars from bilateral varicocele surgeries are identified.                                                                 EXTREMITIES:  No cyanosis, clubbing, or edema.                                                               LYMPHATIC:  There is no cervical, axillary, or supraclavicular adenopathy.   SKIN:  Warm and moist, without petechiae, rashes, induration, or ecchymoses.           NEUROLOGIC:  DTRs are 0-1+ bilaterally, symmetrical, motor function is 5/5,  and cranial nerves are  within normal limits.  Both fundi are sharp.    Assessment:       1. Renal cell carcinoma of kidney excluding renal pelvis    2. CRF, imrpoved    3. Liver metastases    4. Encounter for antineoplastic chemotherapy     5.     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  He will remain on the combination of lenvatinib plus everolimus, and we will increase his lenvatinib to 14 mg daily.   He will have repeat labs including a ferritin in 3 weeks at the [facility], and see me in 6 weeks..  We will repeat his scans in April 2018.  I suggested an ENT consultation for his epistaxis; he will let me knwo whether he wants to see an ENT.  His multiple questions and his wife's multiple questions were answered to his satisfaction.          yes

## 2023-02-22 PROBLEM — E78.49 OTHER HYPERLIPIDEMIA: Status: ACTIVE | Noted: 2023-02-22

## 2023-12-27 NOTE — PROGRESS NOTES
MRI tech called at this time & stated she spoke w/ the radiologist & a CT scan will be done instead of or before MRI is ordered. 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 Hem/Onc on call paged at this time.    normal...

## (undated) DEVICE — SYR ONLY LUER LOCK 20CC

## (undated) DEVICE — DRAPE C ARM 42 X 120 10/BX

## (undated) DEVICE — SUT 3-0 12-18IN SILK

## (undated) DEVICE — SET DECANTER MEDICHOICE

## (undated) DEVICE — SOL NACL 0.9% INJ PF/50151

## (undated) DEVICE — DRAPE THYROID WITH ARMBOARD

## (undated) DEVICE — TRAY MINOR GEN SURG

## (undated) DEVICE — SUT MCRYL PLUS 4-0 PS2 27IN

## (undated) DEVICE — SEE MEDLINE ITEM 157131

## (undated) DEVICE — SUT PROLENE 0 MO6 30IN BLUE

## (undated) DEVICE — SEE MEDLINE ITEM 157117

## (undated) DEVICE — GOWN SURG 2XL DISP TIE BACK

## (undated) DEVICE — SYR DISP LL 5CC

## (undated) DEVICE — ELECTRODE REM PLYHSV RETURN 9

## (undated) DEVICE — BLADE SURG CARBON STEEL SZ11

## (undated) DEVICE — CUP MEDICINE STERILE 2OZ

## (undated) DEVICE — SUT VICRYL 3-0 27 SH